# Patient Record
Sex: MALE | Race: WHITE | Employment: OTHER | URBAN - METROPOLITAN AREA
[De-identification: names, ages, dates, MRNs, and addresses within clinical notes are randomized per-mention and may not be internally consistent; named-entity substitution may affect disease eponyms.]

---

## 2017-05-18 ENCOUNTER — HOSPITAL ENCOUNTER (OUTPATIENT)
Dept: NON INVASIVE DIAGNOSTICS | Age: 77
Discharge: HOME | End: 2017-05-18
Attending: INTERNAL MEDICINE | Admitting: INTERNAL MEDICINE
Payer: MEDICARE

## 2017-05-18 ENCOUNTER — APPOINTMENT (OUTPATIENT)
Dept: GENERAL RADIOLOGY | Age: 77
End: 2017-05-18
Attending: INTERNAL MEDICINE
Payer: MEDICARE

## 2017-05-18 VITALS
WEIGHT: 202 LBS | DIASTOLIC BLOOD PRESSURE: 58 MMHG | RESPIRATION RATE: 20 BRPM | TEMPERATURE: 98.2 F | SYSTOLIC BLOOD PRESSURE: 99 MMHG | OXYGEN SATURATION: 97 % | HEART RATE: 60 BPM | BODY MASS INDEX: 26.77 KG/M2 | HEIGHT: 73 IN

## 2017-05-18 LAB
ANION GAP BLD CALC-SCNC: 6 MMOL/L (ref 5–15)
BASOPHILS # BLD AUTO: 0 K/UL (ref 0–0.1)
BASOPHILS # BLD: 0 % (ref 0–1)
BUN SERPL-MCNC: 16 MG/DL (ref 6–20)
BUN/CREAT SERPL: 13 (ref 12–20)
CALCIUM SERPL-MCNC: 8.6 MG/DL (ref 8.5–10.1)
CHLORIDE SERPL-SCNC: 106 MMOL/L (ref 97–108)
CO2 SERPL-SCNC: 24 MMOL/L (ref 21–32)
CREAT SERPL-MCNC: 1.25 MG/DL (ref 0.7–1.3)
EOSINOPHIL # BLD: 0.2 K/UL (ref 0–0.4)
EOSINOPHIL NFR BLD: 3 % (ref 0–7)
ERYTHROCYTE [DISTWIDTH] IN BLOOD BY AUTOMATED COUNT: 12.3 % (ref 11.5–14.5)
GLUCOSE SERPL-MCNC: 100 MG/DL (ref 65–100)
HCT VFR BLD AUTO: 42.5 % (ref 36.6–50.3)
HGB BLD-MCNC: 14.4 G/DL (ref 12.1–17)
LYMPHOCYTES # BLD AUTO: 22 % (ref 12–49)
LYMPHOCYTES # BLD: 1.5 K/UL (ref 0.8–3.5)
MCH RBC QN AUTO: 31.7 PG (ref 26–34)
MCHC RBC AUTO-ENTMCNC: 33.9 G/DL (ref 30–36.5)
MCV RBC AUTO: 93.6 FL (ref 80–99)
MONOCYTES # BLD: 0.8 K/UL (ref 0–1)
MONOCYTES NFR BLD AUTO: 12 % (ref 5–13)
NEUTS SEG # BLD: 4.3 K/UL (ref 1.8–8)
NEUTS SEG NFR BLD AUTO: 63 % (ref 32–75)
PLATELET # BLD AUTO: 161 K/UL (ref 150–400)
POTASSIUM SERPL-SCNC: 5.4 MMOL/L (ref 3.5–5.1)
RBC # BLD AUTO: 4.54 M/UL (ref 4.1–5.7)
SODIUM SERPL-SCNC: 136 MMOL/L (ref 136–145)
WBC # BLD AUTO: 6.9 K/UL (ref 4.1–11.1)

## 2017-05-18 PROCEDURE — C1785 PMKR, DUAL, RATE-RESP: HCPCS

## 2017-05-18 PROCEDURE — 74011000250 HC RX REV CODE- 250: Performed by: INTERNAL MEDICINE

## 2017-05-18 PROCEDURE — 77030031139 HC SUT VCRL2 J&J -A

## 2017-05-18 PROCEDURE — 80048 BASIC METABOLIC PNL TOTAL CA: CPT | Performed by: INTERNAL MEDICINE

## 2017-05-18 PROCEDURE — 77030018729 HC ELECTRD DEFIB PAD CARD -B

## 2017-05-18 PROCEDURE — 71010 XR CHEST PORT: CPT

## 2017-05-18 PROCEDURE — C1898 LEAD, PMKR, OTHER THAN TRANS: HCPCS

## 2017-05-18 PROCEDURE — 77030002996 HC SUT SLK J&J -A

## 2017-05-18 PROCEDURE — 74011000272 HC RX REV CODE- 272: Performed by: INTERNAL MEDICINE

## 2017-05-18 PROCEDURE — 85025 COMPLETE CBC W/AUTO DIFF WBC: CPT | Performed by: INTERNAL MEDICINE

## 2017-05-18 PROCEDURE — 36415 COLL VENOUS BLD VENIPUNCTURE: CPT | Performed by: INTERNAL MEDICINE

## 2017-05-18 PROCEDURE — C1769 GUIDE WIRE: HCPCS

## 2017-05-18 PROCEDURE — C1892 INTRO/SHEATH,FIXED,PEEL-AWAY: HCPCS

## 2017-05-18 PROCEDURE — A4565 SLINGS: HCPCS

## 2017-05-18 PROCEDURE — 77030012935 HC DRSG AQUACEL BMS -B

## 2017-05-18 PROCEDURE — 74011636320 HC RX REV CODE- 636/320: Performed by: INTERNAL MEDICINE

## 2017-05-18 PROCEDURE — 33216 INSERT 1 ELECTRODE PM-DEFIB: CPT

## 2017-05-18 PROCEDURE — 74011250636 HC RX REV CODE- 250/636: Performed by: INTERNAL MEDICINE

## 2017-05-18 PROCEDURE — C1894 INTRO/SHEATH, NON-LASER: HCPCS

## 2017-05-18 RX ORDER — SODIUM CHLORIDE 9 MG/ML
500 INJECTION, SOLUTION INTRAVENOUS ONCE
Status: COMPLETED | OUTPATIENT
Start: 2017-05-18 | End: 2017-05-18

## 2017-05-18 RX ORDER — SODIUM CHLORIDE 0.9 % (FLUSH) 0.9 %
5-10 SYRINGE (ML) INJECTION EVERY 8 HOURS
Status: DISCONTINUED | OUTPATIENT
Start: 2017-05-18 | End: 2017-05-18 | Stop reason: HOSPADM

## 2017-05-18 RX ORDER — GLUCOSAMINE HCL 500 MG
1000 TABLET ORAL DAILY
COMMUNITY
End: 2019-02-12

## 2017-05-18 RX ORDER — ACETAMINOPHEN 500 MG
500 TABLET ORAL AS NEEDED
COMMUNITY

## 2017-05-18 RX ORDER — LIDOCAINE HYDROCHLORIDE AND EPINEPHRINE 10; 10 MG/ML; UG/ML
1.5 INJECTION, SOLUTION INFILTRATION; PERINEURAL
Status: DISCONTINUED | OUTPATIENT
Start: 2017-05-18 | End: 2017-05-18

## 2017-05-18 RX ORDER — SODIUM CHLORIDE 9 MG/ML
25 INJECTION, SOLUTION INTRAVENOUS CONTINUOUS
Status: DISCONTINUED | OUTPATIENT
Start: 2017-05-18 | End: 2017-05-18 | Stop reason: HOSPADM

## 2017-05-18 RX ORDER — LANOLIN ALCOHOL/MO/W.PET/CERES
1000 CREAM (GRAM) TOPICAL DAILY
COMMUNITY
End: 2019-08-19 | Stop reason: SDUPTHER

## 2017-05-18 RX ORDER — MIDAZOLAM HYDROCHLORIDE 1 MG/ML
.5-1 INJECTION, SOLUTION INTRAMUSCULAR; INTRAVENOUS
Status: DISCONTINUED | OUTPATIENT
Start: 2017-05-18 | End: 2017-05-18

## 2017-05-18 RX ORDER — CEFAZOLIN SODIUM IN 0.9 % NACL 2 G/50 ML
2 INTRAVENOUS SOLUTION, PIGGYBACK (ML) INTRAVENOUS
Status: DISCONTINUED | OUTPATIENT
Start: 2017-05-18 | End: 2017-05-18

## 2017-05-18 RX ORDER — SODIUM CHLORIDE 0.9 % (FLUSH) 0.9 %
5-10 SYRINGE (ML) INJECTION AS NEEDED
Status: DISCONTINUED | OUTPATIENT
Start: 2017-05-18 | End: 2017-05-18 | Stop reason: HOSPADM

## 2017-05-18 RX ORDER — PYRIDOXINE HCL (VITAMIN B6) 100 MG
100 TABLET ORAL DAILY
COMMUNITY
End: 2019-02-12

## 2017-05-18 RX ORDER — ATROPINE SULFATE 0.1 MG/ML
0.5 INJECTION INTRAVENOUS AS NEEDED
Status: DISCONTINUED | OUTPATIENT
Start: 2017-05-18 | End: 2017-05-18

## 2017-05-18 RX ORDER — CEPHALEXIN 500 MG/1
500 CAPSULE ORAL 2 TIMES DAILY
Qty: 14 CAP | Refills: 0 | Status: SHIPPED | OUTPATIENT
Start: 2017-05-18 | End: 2017-05-25

## 2017-05-18 RX ORDER — SODIUM CHLORIDE 0.9 % (FLUSH) 0.9 %
5 SYRINGE (ML) INJECTION AS NEEDED
Status: DISCONTINUED | OUTPATIENT
Start: 2017-05-18 | End: 2017-05-18

## 2017-05-18 RX ORDER — FENTANYL CITRATE 50 UG/ML
25-200 INJECTION, SOLUTION INTRAMUSCULAR; INTRAVENOUS
Status: DISCONTINUED | OUTPATIENT
Start: 2017-05-18 | End: 2017-05-18

## 2017-05-18 RX ADMIN — FENTANYL CITRATE 50 MCG: 50 INJECTION, SOLUTION INTRAMUSCULAR; INTRAVENOUS at 12:18

## 2017-05-18 RX ADMIN — SODIUM CHLORIDE: 900 IRRIGANT IRRIGATION at 13:49

## 2017-05-18 RX ADMIN — LIDOCAINE HYDROCHLORIDE,EPINEPHRINE BITARTRATE 30 MG: 10; .01 INJECTION, SOLUTION INFILTRATION; PERINEURAL at 12:25

## 2017-05-18 RX ADMIN — MIDAZOLAM HYDROCHLORIDE 1 MG: 1 INJECTION, SOLUTION INTRAMUSCULAR; INTRAVENOUS at 12:51

## 2017-05-18 RX ADMIN — MIDAZOLAM HYDROCHLORIDE 2 MG: 1 INJECTION, SOLUTION INTRAMUSCULAR; INTRAVENOUS at 12:26

## 2017-05-18 RX ADMIN — MIDAZOLAM HYDROCHLORIDE 2 MG: 1 INJECTION, SOLUTION INTRAMUSCULAR; INTRAVENOUS at 13:45

## 2017-05-18 RX ADMIN — SODIUM CHLORIDE 500 ML: 900 INJECTION, SOLUTION INTRAVENOUS at 12:18

## 2017-05-18 RX ADMIN — FENTANYL CITRATE 50 MCG: 50 INJECTION, SOLUTION INTRAMUSCULAR; INTRAVENOUS at 13:46

## 2017-05-18 RX ADMIN — MIDAZOLAM HYDROCHLORIDE 1 MG: 1 INJECTION, SOLUTION INTRAMUSCULAR; INTRAVENOUS at 13:49

## 2017-05-18 RX ADMIN — MIDAZOLAM HYDROCHLORIDE 2 MG: 1 INJECTION, SOLUTION INTRAMUSCULAR; INTRAVENOUS at 12:17

## 2017-05-18 RX ADMIN — CEFAZOLIN 2 G: 1 INJECTION, POWDER, FOR SOLUTION INTRAMUSCULAR; INTRAVENOUS; PARENTERAL at 12:09

## 2017-05-18 RX ADMIN — SODIUM CHLORIDE 25 ML/HR: 900 INJECTION, SOLUTION INTRAVENOUS at 11:15

## 2017-05-18 RX ADMIN — IOPAMIDOL 50 ML: 755 INJECTION, SOLUTION INTRAVENOUS at 12:30

## 2017-05-18 RX ADMIN — FENTANYL CITRATE 50 MCG: 50 INJECTION, SOLUTION INTRAMUSCULAR; INTRAVENOUS at 12:25

## 2017-05-18 RX ADMIN — FENTANYL CITRATE 50 MCG: 50 INJECTION, SOLUTION INTRAMUSCULAR; INTRAVENOUS at 13:28

## 2017-05-18 RX ADMIN — MIDAZOLAM HYDROCHLORIDE 2 MG: 1 INJECTION, SOLUTION INTRAMUSCULAR; INTRAVENOUS at 13:19

## 2017-05-18 NOTE — PROGRESS NOTES
TRANSFER - OUT REPORT:    Verbal report given to Joel Thurman on Selene Never. being transferred to  for routine progression of care       Report consisted of patients Situation, Background, Assessment and   Recommendations(SBAR). Information from the following report(s) SBAR, Procedure Summary and MAR was reviewed with the receiving nurse. Opportunity for questions and clarification was provided.

## 2017-05-18 NOTE — PROGRESS NOTES
TRANSFER - IN REPORT:    Verbal report received from [employer] on Job Bejarano.  being received from procedure for routine progression of care. Report consisted of patients Situation, Background, Assessment and Recommendations(SBAR). Information from the following report(s) Procedure Summary, MAR, Recent Results and Med Rec Status was reviewed with the receiving clinician. Opportunity for questions and clarification was provided. Assessment completed upon patients arrival to [address] [place] [address] and care assumed. Cardiac Cath Lab Recovery Arrival Note:    Job Bejarano. arrived to [facility]. Patient procedure= generator changed, new RV lead. Patient on cardiac monitor, non-invasive blood pressure, SPO2 monitor. On  O2 @ 2 lpm via n/c. IV  of nacl on pump at 100 ml/hr. Patient status doing well without problems. Patient is A&Ox 4, very sleepy. Patient reports no complaints. PROCEDURE SITE CHECK:    Procedure site:without any bleeding and or hematoma, no pain/discomfort reported at procedure site. No change in patient status. Continue to monitor patient and status. 26  Dr Ryan Cassidy talked with pt's wife via the phone. [address] completed,ice bag applied to left chest wall. Wife took Rx for Keflex to out pt pharmacy fo fill.

## 2017-05-18 NOTE — DISCHARGE INSTRUCTIONS
PATIENT INSTRUCTIONS POST-PACEMAKER IMPLANT    1. No heavy lifting or exercises with the left arm for 2 weeks. This includes the following:  Do not raise arm above the shoulder level to comb hair, pull on clothes, etc... Do not use the affected arm to pull up or push up from a seated or laying  down position. Do not allow anyone else to pull on the affected arm. 2.  Do not shower for 7 days after discharge from the hospital.  Sponge baths are O.K., provided the pacemaker site is kept dry and the  affected arm is not raised and movement is limited. 3.  Please do not remove steri-strips. 4.  Do not drive for 1 week. 5.  Call Dr. En Zabala [phone] if you experience any of the following symptoms:  1. Redness at the pacemaker site  2. Swelling at or around the pacemaker or in the left arm  3. Pain around the pacemaker  4. Dizziness, lightheadedness, fainting spells  5. Lack of energy  6. Shortness of breath  7. Rapid heart rate  8. Chest or muscle twitches    6. Follow-up with Dr. En Zabala  in 7 days.         Medications to take at home: Keflex 500 mg tablet; take one tablet twice a day for one week    DATE: __________  Physician: ______________________________

## 2017-05-18 NOTE — PROCEDURES
Pacemaker Procedure Note  Kolby Meier  [id number]      Date of Procedure: 5/18/2017    Physician: Andrew Denis MD    Referring Physician: Dr. Carolina Person    Indications: This is a 67 yrs male who presents with IVON of chronic generator which was initially implanted for SSS and new insertion of V lead. Procedure: dual chamber pacemaker generator replacement and new V lead implantation and testing  The patient received prophylactic antibiotics in route to the laboratory. Once there, He was prepped and draped in the usual sterile fashion. The entry site was anesthetized with 1% lidocaine locally. A 4cm incision was then made parallel to the left clavicle. Utilizing blunt dissection, the chronic generator was explanted and chronic leads were interrogated. The V lead is no good but atrial lead was good and was connected to new generator. A new V lead was introduced as below. Using the modified Seldinger technique, A guidewire was placed in the left subclavian vein. This was exchanged for tear-away sheath. Through this, the ventricular lead was passed and the sheath torn away. This lead was actively fixed in RV apex. After documenting appropriate sensing and capture threshold, the lead was secured to the pectoralis floor with Nurolon suture around a suture sleeve. The gauzes were removed from the pocket. The pocket was irrigated with copious amounts of antibiotic solution. The leads were connected to the generator and the generator placed within the pocket. The pocket was closed with a running suture of 3.0 Vicryl. The skin was closed with 4.0 Vicryl. Antibiotic ointment and dressing were applied. The patient was returned to the cardiac care unit in stable condition. Complications: None    Implant Data: The patient received a [employer] generator, Model # PM [id number], serial number [id number]. The atrial lead was a chronic lead, serial number [id number]. P-waves were 1.1 millivolts.  The capture threshold was 1.25 volts at 0.4 milliseconds. The lead impedance was 440 ohms. The ventricular lead was a new [employer] lead, serial number [id number]. R-waves were 2.7 millivolts. The capture threshold was 1.4 volts at 0.4 milliseconds. The lead impedance was 680 ohms.        EBL; 20 cc  Specimen removed; none other than explanted chronic generator  Complication; none    Plan: usual post pacer care      Signed By: Kevyn Vanessa MD

## 2017-05-18 NOTE — PROGRESS NOTES
Cardiac Cath Lab Procedure Area Arrival Note:    Aguila Lovett. arrived to Cardiac Cath Lab, Procedure Area. Patient identifiers verified with NAME and DATE OF BIRTH. Procedure verified with patient. Consent forms verified. Allergies verified. Patient informed of procedure and plan of care. Questions answered with review. Patient voiced understanding of procedure and plan of care. Patient on cardiac monitor, non-invasive blood pressure, SPO2 monitor. On RA, placed on O2 @ 2 lpm via n/c. IV of ns on pump at 25 ml/hr. Patient status doing well without problems. Patient is A&Ox 3. Patient reports no pain. Patient medicated during procedure with orders obtained and verified by Dr. Glendia Mohs. Refer to patients Cardiac Cath Lab PROCEDURE REPORT for vital signs, assessment, status, and response during procedure, printed at end of case. Printed report on chart or scanned into chart.

## 2017-05-18 NOTE — IP AVS SNAPSHOT
[address] [place] [address] 
[phone] Patient: Talia Proctor MRN: [id number] ITE:0/7/8701 You are allergic to the following No active allergies Recent Documentation Height Weight BMI Smoking Status 1.854 m 91.6 kg 26.65 kg/m2 Former Smoker Emergency Contacts Name Discharge Info Relation Home Work Mobile [address] [facility] CAREGIVER [3] Spouse [3] [phone] About your hospitalization You were admitted on:  May 18, 2017 [facility] You were discharged on:  May 18, 2017 Unit phone number:  [phone] Why you were hospitalized Your primary diagnosis was:  Not on File Providers Seen During Your Hospitalizations Provider Role Specialty Primary office phone Jonh Martinez MD Attending Provider Cardiology [phone] Your Primary Care Physician (PCP) Primary Care Physician Office Phone Office Fax Darrick Pulido [phone] Follow-up Information Follow up With Details Comments Contact Info Harvey Parham MD   [address] [place] [address] [address] [place] [address] [phone] Jonh Martinez MD Schedule an appointment as soon as possible for a visit in 1 week  [address] [address] [place] [address] 
[phone] Current Discharge Medication List  
  
START taking these medications Dose & Instructions Dispensing Information Comments Morning Noon Evening Bedtime  
 cephALEXin 500 mg capsule Commonly known as:  Alexus Lai Your last dose was: Your next dose is:    
   
   
 Dose:  500 mg Take 1 Cap by mouth two (2) times a day for 7 days. Quantity:  14 Cap Refills:  0 CONTINUE these medications which have NOT CHANGED Dose & Instructions Dispensing Information Comments Morning Noon Evening Bedtime  
 aspirin 325 mg tablet Commonly known as:  ASPIRIN Your last dose was: Your next dose is:    
   
   
 Dose:  325 mg Take 325 mg by mouth daily. Refills:  0 Cholecalciferol (Vitamin D3) 3,000 unit Tab Your last dose was: Your next dose is:    
   
   
 Dose:  1000 Units Take 1,000 Units by mouth daily. Refills:  0  
     
   
   
   
  
 CRESTOR 10 mg tablet Generic drug:  rosuvastatin Your last dose was: Your next dose is: TAKE 1 TABLET BY MOUTH EVERY NIGHT AT BEDTIME Quantity:  90 Tab Refills:  3 FISH OIL 1,000 mg Cap Generic drug:  omega-3 fatty acids-vitamin e Your last dose was: Your next dose is:    
   
   
 Dose:  2 Cap Take 2 Caps by mouth daily. Refills:  0  
     
   
   
   
  
 hydroCHLOROthiazide 12.5 mg tablet Commonly known as:  HYDRODIURIL Your last dose was: Your next dose is:    
   
   
 Dose:  1 Tab Take 1 Tab by mouth daily. Refills:  0  
     
   
   
   
  
 NITROSTAT 0.4 mg SL tablet Generic drug:  nitroglycerin Your last dose was: Your next dose is:    
   
   
  Refills:  0  
     
   
   
   
  
 PREVNAR 13 (PF) 0.5 mL Syrg injection Generic drug:  pneumococcal 13 maribel conj dip Your last dose was: Your next dose is:    
   
   
  Refills:  0 Ramipril 10 mg Tab Your last dose was: Your next dose is:    
   
   
 Dose:  10 mg Take 10 mg by mouth daily. Indications: HYPERTENSION Quantity:  90 Tab Refills:  3  
     
   
   
   
  
 sildenafil citrate 100 mg tablet Commonly known as:  VIAGRA Your last dose was: Your next dose is:    
   
   
 Dose:  100 mg Take 1 Tab by mouth as needed. Indications: ERECTILE DYSFUNCTION Quantity:  20 Tab Refills:  5 TYLENOL EXTRA STRENGTH 500 mg tablet Generic drug:  acetaminophen Your last dose was: Your next dose is:    
   
   
 Dose:  500 mg Take 500 mg by mouth every six (6) hours as needed for Pain. Refills:  0  
     
   
   
   
  
 VITAMIN B-12 1,000 mcg tablet Generic drug:  cyanocobalamin Your last dose was: Your next dose is:    
   
   
 Dose:  1000 mcg Take 1,000 mcg by mouth daily. Refills:  0  
     
   
   
   
  
 VITAMIN B-6 100 mg tablet Generic drug:  pyridoxine (vitamin B6) Your last dose was: Your next dose is:    
   
   
 Dose:  100 mg Take 100 mg by mouth daily. Refills:  0 Where to Get Your Medications Information on where to get these meds will be given to you by the nurse or doctor. ! Ask your nurse or doctor about these medications  
  cephALEXin 500 mg capsule Discharge Instructions PATIENT INSTRUCTIONS POST-PACEMAKER IMPLANT 1. No heavy lifting or exercises with the left arm for 2 weeks. This includes the following: Do not raise arm above the shoulder level to comb hair, pull on clothes, etc... Do not use the affected arm to pull up or push up from a seated or laying 
down position. Do not allow anyone else to pull on the affected arm. 2.  Do not shower for 7 days after discharge from the hospital. 
Sponge baths are O.K., provided the pacemaker site is kept dry and the 
affected arm is not raised and movement is limited. 3.  Please do not remove steri-strips. 4.  Do not drive for 1 week. 5.  Call Dr. Kamla Berg [phone] if you experience any of the following symptoms: 1. Redness at the pacemaker site 2. Swelling at or around the pacemaker or in the left arm 3. Pain around the pacemaker 4. Dizziness, lightheadedness, fainting spells 5. Lack of energy 6. Shortness of breath 7. Rapid heart rate 8. Chest or muscle twitches 6.  Follow-up with Dr. Chente Topete  in 7 days. Medications to take at home: Keflex 500 mg tablet; take one tablet twice a day for one week DATE: __________  Physician: ______________________________ Discharge Orders None ACO Transitions of Care Introducing [facility] [address] Krista Johnson offers a voluntary care coordination program to provide high quality service and care to [facility] fee-for-service beneficiaries. Evonne Sullivan was designed to help you enhance your health and well-being through the following services: ? Transitions of Care  support for individuals who are transitioning from one care setting to another (example: Hospital to home). ? Chronic and Complex Care Coordination  support for individuals and caregivers of those with serious or chronic illnesses or with more than one chronic (ongoing) condition and those who take a number of different medications. If you meet specific medical criteria, a [address] [place] [address] Rd may call you directly to coordinate your care with your primary care physician and your other care providers. For questions about the [facility] programs, please, contact your physicians office. For general questions or additional information about Accountable Care Organizations: 
Please visit www.medicare.gov/acos. html or call 1-800-MEDICARE (1-338.719.2452) TTY users should call 1-666.601.2128. Introducing [facility]! [facility] introduces M[employer]rx patient portal. Now you can access parts of your medical record, email your doctor's office, and request medication refills online. 1. In your internet browser, go to [email or web address] 2. Click on the First Time User? Click Here link in the Sign In box. You will see the New Member Sign Up page. 3. Enter your M[employer]rx Access Code exactly as it appears below.  You will not need to use this code after youve completed the sign-up process. If you do not sign up before the expiration date, you must request a new code. · Medallia Access Code: [email or web address] Expires: 8/15/2017  2:19 PM 
 
4. Enter the last four digits of your Social Security Number (xxxx) and Date of Birth (mm/dd/yyyy) as indicated and click Submit. You will be taken to the next sign-up page. 5. Create a Medallia ID. This will be your Medallia login ID and cannot be changed, so think of one that is secure and easy to remember. 6. Create a Medallia password. You can change your password at any time. 7. Enter your Password Reset Question and Answer. This can be used at a later time if you forget your password. 8. Enter your e-mail address. You will receive e-mail notification when new information is available in [address] 9. Click Sign Up. You can now view and download portions of your medical record. 10. Click the Download Summary menu link to download a portable copy of your medical information. If you have questions, please visit the Frequently Asked Questions section of the Medallia website. Remember, Medallia is NOT to be used for urgent needs. For medical emergencies, dial 911. Now available from your iPhone and Android! General Information Please provide this summary of care documentation to your next provider. Patient Signature:  ____________________________________________________________ Date:  ____________________________________________________________  
  
Julisa Cook Provider Signature:  ____________________________________________________________ Date:  ____________________________________________________________

## 2017-05-18 NOTE — PROGRESS NOTES
Tolerated food and drink. Discharge instruction reviewed with pt and wife. Dr Naheed Enriquez notified of PCXR, no pneumo lungs clear.  K 5.4 in this Am labs. Told to inform pt to eat low Potassium foods, pt states he really only eats bananas, instructed pt and wife to restrict for a few days.

## 2017-05-18 NOTE — PROGRESS NOTES
Pt ambulated 75 ft gait steady, voided  Assisted with dressing. Ice bag to left chest wall sling to left arm.  1615 discharged via w/c with wife.

## 2017-10-11 ENCOUNTER — CLINICAL SUPPORT (OUTPATIENT)
Dept: INTERNAL MEDICINE CLINIC | Age: 77
End: 2017-10-11

## 2017-10-11 DIAGNOSIS — Z23 ENCOUNTER FOR IMMUNIZATION: Primary | ICD-10-CM

## 2017-10-27 DIAGNOSIS — M79.2 NEURALGIA: ICD-10-CM

## 2017-10-27 RX ORDER — GABAPENTIN 300 MG/1
300 CAPSULE ORAL
Qty: 30 CAP | Refills: 2 | Status: CANCELLED | OUTPATIENT
Start: 2017-10-27

## 2017-10-27 NOTE — TELEPHONE ENCOUNTER
Declined due to prior discontinuation of medication. Please encourage follow up for office visit appt.

## 2017-10-27 NOTE — TELEPHONE ENCOUNTER
Called pt, he acknowledge understanding but still wants something called in place because he is having headaches. Since pt requires thirty minutes for an appointment, there is nothing available. Please advise and call patient back.

## 2017-10-30 NOTE — TELEPHONE ENCOUNTER
Patient came into office requesting refill of medication. Informed patient that a f/u visit is needed. Spoke with  and placed patient in appt spot 11/3 at 9:45. Patient would still like to know what alternative he can take until Friday.  Patient can be reached at: [phone]

## 2017-10-31 RX ORDER — GABAPENTIN 300 MG/1
300 CAPSULE ORAL
Qty: 30 CAP | Refills: 2 | Status: SHIPPED | OUTPATIENT
Start: 2017-10-31 | End: 2019-02-12

## 2017-10-31 NOTE — TELEPHONE ENCOUNTER
If the neuralgia type headaches have recurred, then he may resume the gabapentin at the previous dose. appt 11/3/17 to review further.

## 2017-11-03 ENCOUNTER — OFFICE VISIT (OUTPATIENT)
Dept: INTERNAL MEDICINE CLINIC | Age: 77
End: 2017-11-03

## 2017-11-03 VITALS
HEART RATE: 60 BPM | DIASTOLIC BLOOD PRESSURE: 65 MMHG | RESPIRATION RATE: 20 BRPM | TEMPERATURE: 98.2 F | BODY MASS INDEX: 27.17 KG/M2 | SYSTOLIC BLOOD PRESSURE: 128 MMHG | HEIGHT: 73 IN | OXYGEN SATURATION: 97 % | WEIGHT: 205 LBS

## 2017-11-03 DIAGNOSIS — I10 ESSENTIAL HYPERTENSION: ICD-10-CM

## 2017-11-03 DIAGNOSIS — N40.0 BENIGN PROSTATIC HYPERPLASIA WITHOUT LOWER URINARY TRACT SYMPTOMS: ICD-10-CM

## 2017-11-03 DIAGNOSIS — E55.9 VITAMIN D DEFICIENCY: ICD-10-CM

## 2017-11-03 DIAGNOSIS — M54.81 OCCIPITAL NEURALGIA, UNSPECIFIED LATERALITY: ICD-10-CM

## 2017-11-03 DIAGNOSIS — Z00.00 INITIAL MEDICARE ANNUAL WELLNESS VISIT: Primary | ICD-10-CM

## 2017-11-03 DIAGNOSIS — E78.00 HYPERCHOLESTEROLEMIA: ICD-10-CM

## 2017-11-03 DIAGNOSIS — Z95.0 STATUS CARDIAC PACEMAKER: ICD-10-CM

## 2017-11-03 DIAGNOSIS — R73.9 HYPERGLYCEMIA: ICD-10-CM

## 2017-11-03 DIAGNOSIS — G31.84 MILD COGNITIVE IMPAIRMENT WITH MEMORY LOSS: ICD-10-CM

## 2017-11-03 DIAGNOSIS — E53.8 B12 DEFICIENCY: ICD-10-CM

## 2017-11-03 NOTE — PROGRESS NOTES
HISTORY OF PRESENT ILLNESS  Roberto Peña is a 65 y.o. male. HPI  Presents for f/u neuralgia pains, HTN, lipids, etc    Neuralgia pains recurred  Pt requested gabapentin  Used med for a few days with +benefit  Severe pain resolved, so pt dc'd med     Pt reports residual palpable mild discomfort  But, not significant per pt    Pt feels that memory is not perfect but not any worse now than a year ago    Pt denies significant BPH sx  Good stream and no significant nocturia    Past medical, Social, and Family history reviewed  Medications reviewed and updated. ROS  Complete ROS reviewed and negative or stable except as noted in HPI. Physical Exam   Constitutional: He is oriented to person, place, and time. He appears well-nourished. No distress. HENT:   Head: Normocephalic and atraumatic. Mouth/Throat: Oropharynx is clear and moist.   Eyes: EOM are normal. Pupils are equal, round, and reactive to light. No scleral icterus. Neck: Normal range of motion. Neck supple. No JVD present. Cardiovascular: Normal rate, regular rhythm and normal heart sounds. Exam reveals no gallop and no friction rub. No murmur heard. Pulmonary/Chest: Effort normal and breath sounds normal. No respiratory distress. He has no wheezes. He has no rales. Abdominal: Soft. He exhibits no distension. There is no tenderness. Musculoskeletal: Normal range of motion. He exhibits no edema. Lymphadenopathy:     He has no cervical adenopathy. Neurological: He is alert and oriented to person, place, and time. He exhibits normal muscle tone. Coordination normal.   Skin: Skin is warm. No rash noted. Psychiatric: He has a normal mood and affect. Nursing note and vitals reviewed. Prior labs reviewed. ASSESSMENT and PLAN  ? cervical OA and muscle tension exacerbating an occipital neuralgia    ICD-10-CM ICD-9-CM    1. Occipital neuralgia, unspecified laterality M54.81 723.8    2.  Mild cognitive impairment with memory loss G31.84 331.83      780.93    3. Status cardiac pacemaker Z95.0 V45.01    4. Essential hypertension I10 401.9 CBC WITH AUTOMATED DIFF   5. Hypercholesterolemia E78.00 272.0 CK      LIPID PANEL      METABOLIC PANEL, COMPREHENSIVE   6. Benign prostatic hyperplasia without lower urinary tract symptoms N40.0 600.00 PSA, DIAGNOSTIC (PROSTATE SPECIFIC AG)   7. B12 deficiency E53.8 266.2 VITAMIN B12   8. Vitamin D deficiency E55.9 268.9 VITAMIN D, 25 HYDROXY   9. Hyperglycemia R73.9 790.29 HEMOGLOBIN A1C WITH EAG   10. Initial Medicare annual wellness visit Z00.00 V70.0      Follow-up Disposition:  Return in about 6 months (around 5/3/2018), or if symptoms worsen or fail to improve, for blood pressure, cholesterol.   results and schedule of future studies reviewed with patient  reviewed diet, exercise and weight   cardiovascular risk and specific lipid/LDL goals reviewed  reviewed medications and side effects in detail   May use gabapentin intermittently  Consider C spine xrays  Consider PT - hold for now

## 2017-11-03 NOTE — MR AVS SNAPSHOT
Visit Information Date & Time Provider Department Dept. Phone Encounter #  
 11/3/2017  9:45 AM Maddy Javed, [address] [place] [address] and Internal Medicine [phone] [id number] Follow-up Instructions Return in about 6 months (around 5/3/2018), or if symptoms worsen or fail to improve, for blood pressure, cholesterol. Upcoming Health Maintenance Date Due DTaP/Tdap/Td series (1 - Tdap) 9/9/1960 MEDICARE YEARLY EXAM 6/23/2017 GLAUCOMA SCREENING Q2Y 5/10/2018 COLONOSCOPY 8/28/2018 Allergies as of 11/3/2017  Review Complete On: 11/3/2017 By: Maddy Javed MD  
 No Known Allergies Current Immunizations  Reviewed on 11/3/2017 Name Date Influenza High Dose Vaccine PF 10/11/2017, 9/20/2016 Influenza Vaccine 10/25/2013 Influenza Vaccine Split 11/27/2012  1:53 PM  
 Pneumococcal Conjugate (PCV-13) 6/22/2016 Pneumococcal Vaccine (Unspecified Type) 1/1/2012 Zoster Vaccine, Live 11/7/2013 Reviewed by Maddy Javed MD on 11/3/2017 at 11:02 AM  
 Reviewed by Maddy Javed MD on 11/3/2017 at 11:23 AM  
You Were Diagnosed With   
  
 Codes Comments Occipital neuralgia, unspecified laterality    -  Primary ICD-10-CM: M54.81 ICD-9-CM: 723.8 Mild cognitive impairment with memory loss     ICD-10-CM: G31.84 ICD-9-CM: 331.83, 780.93 Status cardiac pacemaker     ICD-10-CM: Z95.0 ICD-9-CM: V45.01 Essential hypertension     ICD-10-CM: I10 
ICD-9-CM: 401.9 Hypercholesterolemia     ICD-10-CM: E78.00 ICD-9-CM: 272.0 Benign prostatic hyperplasia without lower urinary tract symptoms     ICD-10-CM: N40.0 ICD-9-CM: 600.00   
 B12 deficiency     ICD-10-CM: E53.8 ICD-9-CM: 266.2 Vitamin D deficiency     ICD-10-CM: E55.9 ICD-9-CM: 268.9 Hyperglycemia     ICD-10-CM: R73.9 ICD-9-CM: 790.29 Initial Medicare annual wellness visit     ICD-10-CM: Z00.00 ICD-9-CM: V70.0 Vitals BP Pulse Temp Resp Height(growth percentile) Weight(growth percentile) 128/65 (BP 1 Location: Right arm, BP Patient Position: Sitting) 60 98.2 °F (36.8 °C) (Oral) 20 6' 1\" (1.854 m) 205 lb (93 kg) SpO2 BMI Smoking Status 97% 27.05 kg/m2 Former Smoker BMI and BSA Data Body Mass Index Body Surface Area  
 27.05 kg/m 2 2.19 m 2 Preferred Pharmacy Pharmacy Name Phone [facility] [address] [phone] Your Updated Medication List  
  
   
This list is accurate as of: 11/3/17 11:30 AM.  Always use your most recent med list.  
  
  
  
  
 aspirin 325 mg tablet Commonly known as:  ASPIRIN Take 325 mg by mouth daily. Cholecalciferol (Vitamin D3) 3,000 unit Tab Take 1,000 Units by mouth daily. CRESTOR 10 mg tablet Generic drug:  rosuvastatin TAKE 1 TABLET BY MOUTH EVERY NIGHT AT BEDTIME  
  
 FISH OIL 1,000 mg Cap Generic drug:  omega-3 fatty acids-vitamin e Take 2 Caps by mouth daily. gabapentin 300 mg capsule Commonly known as:  NEURONTIN Take 1 Cap by mouth nightly. hydroCHLOROthiazide 12.5 mg tablet Commonly known as:  HYDRODIURIL Take 1 Tab by mouth daily. NITROSTAT 0.4 mg SL tablet Generic drug:  nitroglycerin PREVNAR 13 (PF) 0.5 mL Syrg injection Generic drug:  pneumococcal 13 maribel conj dip Ramipril 10 mg Tab Take 10 mg by mouth daily. Indications: HYPERTENSION  
  
 * sildenafil citrate 100 mg tablet Commonly known as:  VIAGRA Take 1 Tab by mouth as needed. Indications: ERECTILE DYSFUNCTION  
  
 * sildenafil 20 mg tablet Commonly known as:  REVATIO  
TAKE 1 TABLET BY MOUTH EVERY DAY AS NEEDED  
  
 TYLENOL EXTRA STRENGTH 500 mg tablet Generic drug:  acetaminophen Take 500 mg by mouth every six (6) hours as needed for Pain. VITAMIN B-12 1,000 mcg tablet Generic drug:  cyanocobalamin Take 1,000 mcg by mouth daily. VITAMIN B-6 100 mg tablet Generic drug:  pyridoxine (vitamin B6) Take 100 mg by mouth daily. * Notice: This list has 2 medication(s) that are the same as other medications prescribed for you. Read the directions carefully, and ask your doctor or other care provider to review them with you. Follow-up Instructions Return in about 6 months (around 5/3/2018), or if symptoms worsen or fail to improve, for blood pressure, cholesterol. To-Do List   
 Around 11/07/2017 Lab:  CBC WITH AUTOMATED DIFF Around 11/07/2017 Lab:  CK Around 11/07/2017 Lab:  HEMOGLOBIN A1C WITH EAG Around 11/07/2017 Lab:  LIPID PANEL Around 11/07/2017 Lab:  METABOLIC PANEL, COMPREHENSIVE Around 11/07/2017 Lab:  PSA, DIAGNOSTIC (PROSTATE SPECIFIC AG) Around 11/07/2017 Lab:  VITAMIN B12 Around 11/07/2017 Lab:  VITAMIN D, 25 HYDROXY Patient Instructions Medicare Wellness Visit, Male The best way to live healthy is to have a healthy lifestyle by eating a well-balanced diet, exercising regularly, limiting alcohol and stopping smoking. Regular physical exams and screening tests are another way to keep healthy. Preventive exams provided by your health care provider can find health problems before they become diseases or illnesses. Preventive services including immunizations, screening tests, monitoring and exams can help you take care of your own health. All people over age 72 should have a pneumovax  and and a prevnar shot to prevent pneumonia. These are once in a lifetime unless you and your provider decide differently. All people over 65 should have a yearly flu shot and a tetanus vaccine every 10 years. Screening for diabetes mellitus with a blood sugar test should be done every year.  
 
Glaucoma is a disease of the eye due to increased ocular pressure that can lead to blindness and it should be done every year by an eye professional. 
 
Cardiovascular screening tests that check for elevated lipids (fatty part of blood) which can lead to heart disease and strokes should be done every 5 years. Colorectal screening that evaluates for blood or polyps in your colon should be done yearly as a stool test or every five years as a flexible sigmoidoscope or every 10 years as a colonoscopy up to age 76. Men up to age 76 may need a screening blood test for prostate cancer at certain intervals, depending on their personal and family history. This decision is between the patient and his provider. If you have been a smoker or had family history of abdominal aortic aneurysms, you and your provider may decide to schedule an ultrasound test of your aorta. Hepatitis C screening is also recommended for anyone born between [birth year] through [address]. A shingles vaccine is also recommended once in a lifetime after age 61. Your Medicare Wellness Exam is recommended annually. Here is a list of your current Health Maintenance items with a due date: 
Health Maintenance Due Topic Date Due  
 DTaP/Tdap/Td  (1 - Tdap) 09[birth year]09/1961 Tayla Ignacio Annual Well Visit  06/23/2017 Ask pharmacy about Tdap vaccine. Introducing [address]! Massimo Julio introduces [employer] patient portal. Now you can access parts of your medical record, email your doctor's office, and request medication refills online. 1. In your internet browser, go to [email or web address]. [email or web address] 2. Click on the First Time User? Click Here link in the Sign In box. You will see the New Member Sign Up page. 3. Enter your [employer] Access Code exactly as it appears below. You will not need to use this code after youve completed the sign-up process. If you do not sign up before the expiration date, you must request a new code. · [employer] Access Code: [email or web address] Expires: 1/16/2018  8:15 AM 
 
 4. Enter the last four digits of your Social Security Number (xxxx) and Date of Birth (mm/dd/yyyy) as indicated and click Submit. You will be taken to the next sign-up page. 5. Create a IndexTank ID. This will be your IndexTank login ID and cannot be changed, so think of one that is secure and easy to remember. 6. Create a [employer]Tank password. You can change your password at any time. 7. Enter your Password Reset Question and Answer. This can be used at a later time if you forget your password. 8. Enter your e-mail address. You will receive e-mail notification when new information is available in [address] 9. Click Sign Up. You can now view and download portions of your medical record. 10. Click the Download Summary menu link to download a portable copy of your medical information. If you have questions, please visit the Frequently Asked Questions section of the [employer] website. Remember, IndexTank is NOT to be used for urgent needs. For medical emergencies, dial 911. Now available from your iPhone and Android! Please provide this summary of care documentation to your next provider. Your primary care clinician is listed as [address] If you have any questions after today's visit, please call [phone].

## 2017-11-03 NOTE — PROGRESS NOTES
Rm#13  Chief Complaint   Patient presents with    Medication Evaluation     gabapentin med f/u states he no longer has head pains      1. Have you been to the ER, urgent care clinic since your last visit? Hospitalized since your last visit? No    2. Have you seen or consulted any other health care providers outside of the [address] [place] [address] since your last visit? Include any pap smears or colon screening.  No  Health Maintenance Due   Topic Date Due    DTaP/Tdap/Td series (1 - Tdap) 09/09/1961    MEDICARE YEARLY EXAM  06/23/2017     Due for medicare wellness-pt notifiedm    reviewed

## 2017-11-03 NOTE — PATIENT INSTRUCTIONS

## 2017-11-07 ENCOUNTER — HOSPITAL ENCOUNTER (OUTPATIENT)
Dept: LAB | Age: 77
Discharge: HOME | End: 2017-11-07
Payer: MEDICARE

## 2017-11-07 PROCEDURE — 82607 VITAMIN B-12: CPT

## 2017-11-07 PROCEDURE — 80053 COMPREHEN METABOLIC PANEL: CPT

## 2017-11-07 PROCEDURE — 84153 ASSAY OF PSA TOTAL: CPT

## 2017-11-07 PROCEDURE — 80061 LIPID PANEL: CPT

## 2017-11-07 PROCEDURE — 82550 ASSAY OF CK (CPK): CPT

## 2017-11-07 PROCEDURE — 83036 HEMOGLOBIN GLYCOSYLATED A1C: CPT

## 2017-11-07 PROCEDURE — 82306 VITAMIN D 25 HYDROXY: CPT

## 2017-11-07 PROCEDURE — 85025 COMPLETE CBC W/AUTO DIFF WBC: CPT

## 2017-11-16 DIAGNOSIS — R97.20 RISING PSA LEVEL: Primary | ICD-10-CM

## 2018-05-03 ENCOUNTER — HOSPITAL ENCOUNTER (OUTPATIENT)
Dept: LAB | Age: 78
Discharge: HOME | End: 2018-05-03
Payer: MEDICARE

## 2018-05-03 ENCOUNTER — OFFICE VISIT (OUTPATIENT)
Dept: INTERNAL MEDICINE CLINIC | Age: 78
End: 2018-05-03

## 2018-05-03 VITALS
HEART RATE: 60 BPM | BODY MASS INDEX: 26.48 KG/M2 | OXYGEN SATURATION: 99 % | SYSTOLIC BLOOD PRESSURE: 126 MMHG | DIASTOLIC BLOOD PRESSURE: 70 MMHG | WEIGHT: 199.8 LBS | TEMPERATURE: 97.5 F | RESPIRATION RATE: 14 BRPM | HEIGHT: 73 IN

## 2018-05-03 DIAGNOSIS — E78.00 HYPERCHOLESTEROLEMIA: ICD-10-CM

## 2018-05-03 DIAGNOSIS — L98.9 SKIN LESION OF FACE: Primary | ICD-10-CM

## 2018-05-03 DIAGNOSIS — N40.0 BENIGN PROSTATIC HYPERPLASIA WITHOUT LOWER URINARY TRACT SYMPTOMS: ICD-10-CM

## 2018-05-03 DIAGNOSIS — R73.9 HYPERGLYCEMIA: ICD-10-CM

## 2018-05-03 DIAGNOSIS — K63.5 POLYP OF COLON, UNSPECIFIED PART OF COLON, UNSPECIFIED TYPE: ICD-10-CM

## 2018-05-03 DIAGNOSIS — R97.20 RISING PSA LEVEL: ICD-10-CM

## 2018-05-03 DIAGNOSIS — I10 ESSENTIAL HYPERTENSION: ICD-10-CM

## 2018-05-03 DIAGNOSIS — M17.9 OSTEOARTHRITIS OF KNEE, UNSPECIFIED LATERALITY, UNSPECIFIED OSTEOARTHRITIS TYPE: ICD-10-CM

## 2018-05-03 DIAGNOSIS — G31.84 MILD COGNITIVE IMPAIRMENT WITH MEMORY LOSS: ICD-10-CM

## 2018-05-03 DIAGNOSIS — Z95.0 STATUS CARDIAC PACEMAKER: ICD-10-CM

## 2018-05-03 DIAGNOSIS — L98.9 SKIN LESION OF CHEST WALL: ICD-10-CM

## 2018-05-03 PROCEDURE — 80053 COMPREHEN METABOLIC PANEL: CPT

## 2018-05-03 PROCEDURE — 84153 ASSAY OF PSA TOTAL: CPT

## 2018-05-03 PROCEDURE — 83036 HEMOGLOBIN GLYCOSYLATED A1C: CPT

## 2018-05-03 PROCEDURE — 80061 LIPID PANEL: CPT

## 2018-05-03 PROCEDURE — 82550 ASSAY OF CK (CPK): CPT

## 2018-05-03 PROCEDURE — 85025 COMPLETE CBC W/AUTO DIFF WBC: CPT

## 2018-05-03 NOTE — PROGRESS NOTES
HPI:  Presents for f/u HTN, lipids, etc    C/o skin lesions on face and chest    +med compliance   +med tolerance    No other complaints. No CP, SOB, neuro sx      Past medical, Social, and Family history reviewed    Prior to Admission medications    Medication Sig Start Date End Date Taking? Authorizing Provider   gabapentin (NEURONTIN) 300 mg capsule Take 1 Cap by mouth nightly. 10/31/17  Yes Humera Raygoza MD   sildenafil (REVATIO) 20 mg tablet TAKE 1 TABLET BY MOUTH EVERY DAY AS NEEDED 10/12/17  Yes Humera Raygoza MD   cyanocobalamin (VITAMIN B-12) 1,000 mcg tablet Take 1,000 mcg by mouth daily. Yes Historical Provider   pyridoxine, vitamin B6, (VITAMIN B-6) 100 mg tablet Take 100 mg by mouth daily. Yes Historical Provider   Cholecalciferol, Vitamin D3, 3,000 unit tab Take 1,000 Units by mouth daily. Yes Historical Provider   acetaminophen (TYLENOL EXTRA STRENGTH) 500 mg tablet Take 500 mg by mouth every six (6) hours as needed for Pain. Yes Historical Provider   hydrochlorothiazide (HYDRODIURIL) 12.5 mg tablet Take 1 Tab by mouth daily. 8/30/16  Yes Historical Provider   sildenafil citrate (VIAGRA) 100 mg tablet Take 1 Tab by mouth as needed. Indications: ERECTILE DYSFUNCTION 8/5/16  Yes Humera Raygoza MD   NITROSTAT 0.4 mg SL tablet  4/13/16  Yes Historical Provider   CRESTOR 10 mg tablet TAKE 1 TABLET BY MOUTH EVERY NIGHT AT BEDTIME 7/5/15  Yes Humera Raygoza MD   aspirin (ASPIRIN) 325 mg tablet Take 325 mg by mouth daily. Yes Historical Provider   omega-3 fatty acids-vitamin e (FISH OIL) 1,000 mg Cap Take 2 Caps by mouth daily. Yes Historical Provider   Ramipril 10 mg Tab Take 10 mg by mouth daily. Indications: HYPERTENSION 9/28/12  Yes Humera Raygoza MD   PREVNAR 13, PF, 0.5 mL syrg injection  6/22/16   Historical Provider          ROS  Complete ROS reviewed and negative or stable except as noted in HPI.       Physical Exam   Constitutional: He is oriented to person, place, and time. He appears well-nourished. No distress. HENT:   Head: Normocephalic and atraumatic. Mouth/Throat: Oropharynx is clear and moist.   Eyes: EOM are normal. Pupils are equal, round, and reactive to light. No scleral icterus. Neck: Normal range of motion. Neck supple. No JVD present. Cardiovascular: Normal rate, regular rhythm and normal heart sounds. Exam reveals no gallop and no friction rub. No murmur heard. Pulmonary/Chest: Effort normal and breath sounds normal. No respiratory distress. He has no wheezes. He has no rales. Abdominal: Soft. He exhibits no distension. There is no tenderness. Musculoskeletal: Normal range of motion. He exhibits no edema. Lymphadenopathy:     He has no cervical adenopathy. Neurological: He is alert and oriented to person, place, and time. He exhibits normal muscle tone. Coordination normal.   Skin: Skin is warm. No rash noted. Psychiatric: He has a normal mood and affect. Nursing note and vitals reviewed. Prior labs reviewed. Assessment/Plan:  Favor seb K's for both lesions but r/o other    ICD-10-CM ICD-9-CM    1. Skin lesion of face L98.9 709.9 REFERRAL TO DERMATOLOGY   2. Skin lesion of chest wall L98.9 709.9 REFERRAL TO DERMATOLOGY   3. Essential hypertension I10 401.9 CBC WITH AUTOMATED DIFF   4. Hypercholesterolemia E78.00 272.0 CK      LIPID PANEL      METABOLIC PANEL, COMPREHENSIVE   5. Benign prostatic hyperplasia without lower urinary tract symptoms N40.0 600.00    6. Status cardiac pacemaker Z95.0 V45.01    7. Osteoarthritis of knee, unspecified laterality, unspecified osteoarthritis type M17.10 715.36    8. Mild cognitive impairment with memory loss G31.84 331.83      780.93    9. Rising PSA level R97.20 790.93 PSA, DIAGNOSTIC (PROSTATE SPECIFIC AG)   10. Hyperglycemia R73.9 790.29 HEMOGLOBIN A1C WITH EAG   11.  Polyp of colon, unspecified part of colon, unspecified type K63.5 211.3 REFERRAL TO GASTROENTEROLOGY     Follow-up Disposition:  Return in about 6 months (around 11/3/2018), or if symptoms worsen or fail to improve, for Medicare Wellness Visit, blood pressure, cholesterol.    results and schedule of future studies reviewed with patient  reviewed diet, exercise and weight   cardiovascular risk and specific lipid/LDL goals reviewed  reviewed medications and side effects in detail  Ref to derm surgeon  See eye for glaucoma screening  Continue current medications  Agree to PSA testing and colonoscopy screenings  Fasting labs

## 2018-05-03 NOTE — MR AVS SNAPSHOT
[address]  [address] [place] [address] 
[phone] Patient: Toni Inman. MRN: [id number] FOU:[birth year] Visit Information Date & Time Provider Department Dept. Phone Encounter #  
 5/3/2018  8:45 AM Debbie Doss, [address] [place] [address] and Internal Medicine [phone] [id number] Follow-up Instructions Return in about 6 months (around 11/3/2018), or if symptoms worsen or fail to improve, for Medicare Wellness Visit, blood pressure, cholesterol. Your Appointments 5/31/2018 11:45 AM  
ROUTINE CARE with Debbie Doss MD  
[facility] and Internal Medicine [address] [facility]) Appt Note: per pt/ scraw/$0CP/jnm [address] [facility] [address] [facility] [address]  
[place] [address] [facility] [address] Upcoming Health Maintenance Date Due DTaP/Tdap/Td series (1 - Tdap) 9/9/1960 GLAUCOMA SCREENING Q2Y 5/10/2018 COLONOSCOPY 8/28/2018 Influenza Age 5 to Adult 8/1/2018 MEDICARE YEARLY EXAM 11/4/2018 Allergies as of 5/3/2018  Review Complete On: 5/3/2018 By: Debbie Doss MD  
 No Known Allergies Current Immunizations  Reviewed on 5/3/2018 Name Date Influenza High Dose Vaccine PF 10/11/2017, 9/20/2016 Influenza Vaccine 10/25/2013 Influenza Vaccine Split 11/27/2012  1:53 PM  
 Pneumococcal Conjugate (PCV-13) 6/22/2016 Pneumococcal Vaccine (Unspecified Type) 1/1/2012 Zoster Vaccine, Live 11/7/2013 Reviewed by Debbie Doss MD on 5/3/2018 at  9:38 AM  
You Were Diagnosed With   
  
 Codes Comments Skin lesion of face    -  Primary ICD-10-CM: L98.9 ICD-9-CM: 709.9 Skin lesion of chest wall     ICD-10-CM: L98.9 ICD-9-CM: 709.9 Essential hypertension     ICD-10-CM: I10 
ICD-9-CM: 401.9 Hypercholesterolemia     ICD-10-CM: E78.00 ICD-9-CM: 272.0 Benign prostatic hyperplasia without lower urinary tract symptoms     ICD-10-CM: N40.0 ICD-9-CM: 600.00 Status cardiac pacemaker     ICD-10-CM: Z95.0 ICD-9-CM: V45.01 Osteoarthritis of knee, unspecified laterality, unspecified osteoarthritis type     ICD-10-CM: M17.10 ICD-9-CM: 715.36 Mild cognitive impairment with memory loss     ICD-10-CM: G31.84 ICD-9-CM: 331.83, 780.93 Rising PSA level     ICD-10-CM: R97.20 ICD-9-CM: 790.93 Hyperglycemia     ICD-10-CM: R73.9 ICD-9-CM: 790.29 Polyp of colon, unspecified part of colon, unspecified type     ICD-10-CM: K63.5 ICD-9-CM: 211.3 Vitals BP Pulse Temp Resp Height(growth percentile) Weight(growth percentile) 126/70 (BP 1 Location: Left arm, BP Patient Position: Sitting) 60 97.5 °F (36.4 °C) (Oral) 14 6' 1\" (1.854 m) 199 lb 12.8 oz (90.6 kg) SpO2 BMI Smoking Status 99% 26.36 kg/m2 Former Smoker Vitals History BMI and BSA Data Body Mass Index Body Surface Area  
 26.36 kg/m 2 2.16 m 2 Preferred Pharmacy Pharmacy Name Phone [facility] [address] [phone] Your Updated Medication List  
  
   
This list is accurate as of 5/3/18  9:44 AM.  Always use your most recent med list.  
  
  
  
  
 aspirin 325 mg tablet Commonly known as:  ASPIRIN Take 325 mg by mouth daily. Cholecalciferol (Vitamin D3) 3,000 unit Tab Take 1,000 Units by mouth daily. CRESTOR 10 mg tablet Generic drug:  rosuvastatin TAKE 1 TABLET BY MOUTH EVERY NIGHT AT BEDTIME  
  
 FISH OIL 1,000 mg Cap Generic drug:  omega-3 fatty acids-vitamin e Take 2 Caps by mouth daily. gabapentin 300 mg capsule Commonly known as:  NEURONTIN Take 1 Cap by mouth nightly. hydroCHLOROthiazide 12.5 mg tablet Commonly known as:  HYDRODIURIL Take 1 Tab by mouth daily. NITROSTAT 0.4 mg SL tablet Generic drug:  nitroglycerin PREVNAR 13 (PF) 0.5 mL Syrg injection Generic drug:  pneumococcal 13 maribel conj dip Ramipril 10 mg Tab Take 10 mg by mouth daily. Indications: HYPERTENSION  
  
 * sildenafil citrate 100 mg tablet Commonly known as:  VIAGRA Take 1 Tab by mouth as needed. Indications: ERECTILE DYSFUNCTION  
  
 * sildenafil (antihypertensive) 20 mg tablet Commonly known as:  REVATIO  
TAKE 1 TABLET BY MOUTH EVERY DAY AS NEEDED  
  
 TYLENOL EXTRA STRENGTH 500 mg tablet Generic drug:  acetaminophen Take 500 mg by mouth every six (6) hours as needed for Pain. VITAMIN B-12 1,000 mcg tablet Generic drug:  cyanocobalamin Take 1,000 mcg by mouth daily. VITAMIN B-6 100 mg tablet Generic drug:  pyridoxine (vitamin B6) Take 100 mg by mouth daily. * Notice: This list has 2 medication(s) that are the same as other medications prescribed for you. Read the directions carefully, and ask your doctor or other care provider to review them with you. We Performed the Following CBC WITH AUTOMATED DIFF [53916 CPT(R)] CK [id number] CPT(R)] HEMOGLOBIN A1C WITH EAG [89362 CPT(R)] LIPID PANEL [85485 CPT(R)] METABOLIC PANEL, COMPREHENSIVE [12729 CPT(R)] PSA, DIAGNOSTIC (PROSTATE SPECIFIC AG) [id number] CPT(R)] REFERRAL TO DERMATOLOGY [REF19 Custom] REFERRAL TO GASTROENTEROLOGY [TGM99 Custom] Follow-up Instructions Return in about 6 months (around 11/3/2018), or if symptoms worsen or fail to improve, for Medicare Wellness Visit, blood pressure, cholesterol. Referral Information Referral ID Referred By Referred To  
  
 [id number] Barry YUN MD   
   [address] Phone: [phone] Fax: [phone] Visits Status Start Date End Date 1 New Request 5/3/18 5/3/19  If your referral has a status of pending review or denied, additional information will be sent to support the outcome of this decision. Referral ID Referred By Referred To  
 [id number] JAMA, [address] [place] [address] [place], [address] Visits Status Start Date End Date 1 New Request 5/3/18 5/3/19 If your referral has a status of pending review or denied, additional information will be sent to support the outcome of this decision. Patient Instructions Ask pharmacy about the new shingles and Tdap vaccines Introducing [facility]! [employer] introduces [employer] patient portal. Now you can access parts of your medical record, email your doctor's office, and request medication refills online. 1. In your internet browser, go to [email or web address]. [employer] 2. Click on the First Time User? Click Here link in the Sign In box. You will see the New Member Sign Up page. 3. Enter your [employer] Access Code exactly as it appears below. You will not need to use this code after youve completed the sign-up process. If you do not sign up before the expiration date, you must request a new code. · [employer] Access Code: [email or web address] Expires: 8/1/2018  9:39 AM 
 
4. Enter the last four digits of your Social Security Number (xxxx) and Date of Birth (mm/dd/yyyy) as indicated and click Submit. You will be taken to the next sign-up page. 5. Create a [employer] ID. This will be your [employer] login ID and cannot be changed, so think of one that is secure and easy to remember. 6. Create a [employer] password. You can change your password at any time. 7. Enter your Password Reset Question and Answer. This can be used at a later time if you forget your password. 8. Enter your e-mail address. You will receive e-mail notification when new information is available in [address] 9. Click Sign Up. You can now view and download portions of your medical record. 10. Click the Download Summary menu link to download a portable copy of your medical information. If you have questions, please visit the Frequently Asked Questions section of the [employer] website. Remember, [employer] is NOT to be used for urgent needs. For medical emergencies, dial 911. Now available from your iPhone and Android! Please provide this summary of care documentation to your next provider. Your primary care clinician is listed as [address] If you have any questions after today's visit, please call [phone].

## 2018-05-03 NOTE — PROGRESS NOTES
1. Have you been to the ER, urgent care clinic since your last visit? Hospitalized since your last visit? No    2. Have you seen or consulted any other health care providers outside of the [facility] since your last visit? Include any pap smears or colon screening. No     Chief Complaint   Patient presents with    Hypertension     follow up    Cholesterol Problem     follow up   [facility]     patient noticed a mole on his upper chest, would like to discuss removing  it.       fasting    Room 14

## 2018-05-04 LAB
ALBUMIN SERPL-MCNC: 4.5 G/DL (ref 3.5–4.8)
ALBUMIN/GLOB SERPL: 2 {RATIO} (ref 1.2–2.2)
ALP SERPL-CCNC: 59 IU/L (ref 39–117)
ALT SERPL-CCNC: 20 IU/L (ref 0–44)
AST SERPL-CCNC: 22 IU/L (ref 0–40)
BASOPHILS # BLD AUTO: 0 X10E3/UL (ref 0–0.2)
BASOPHILS NFR BLD AUTO: 0 %
BILIRUB SERPL-MCNC: 0.5 MG/DL (ref 0–1.2)
BUN SERPL-MCNC: 16 MG/DL (ref 8–27)
BUN/CREAT SERPL: 14 (ref 10–24)
CALCIUM SERPL-MCNC: 9.3 MG/DL (ref 8.6–10.2)
CHLORIDE SERPL-SCNC: 102 MMOL/L (ref 96–106)
CHOLEST SERPL-MCNC: 126 MG/DL (ref 100–199)
CK SERPL-CCNC: 78 U/L (ref 24–204)
CO2 SERPL-SCNC: 27 MMOL/L (ref 18–29)
CREAT SERPL-MCNC: 1.12 MG/DL (ref 0.76–1.27)
EOSINOPHIL # BLD AUTO: 0.2 X10E3/UL (ref 0–0.4)
EOSINOPHIL NFR BLD AUTO: 3 %
ERYTHROCYTE [DISTWIDTH] IN BLOOD BY AUTOMATED COUNT: 13 % (ref 12.3–15.4)
EST. AVERAGE GLUCOSE BLD GHB EST-MCNC: 105 MG/DL
GLOBULIN SER CALC-MCNC: 2.2 G/DL (ref 1.5–4.5)
GLUCOSE SERPL-MCNC: 98 MG/DL (ref 65–99)
HBA1C MFR BLD: 5.3 % (ref 4.8–5.6)
HCT VFR BLD AUTO: 44.1 % (ref 37.5–51)
HDLC SERPL-MCNC: 36 MG/DL
HGB BLD-MCNC: 14.9 G/DL (ref 13–17.7)
IMM GRANULOCYTES # BLD: 0 X10E3/UL (ref 0–0.1)
IMM GRANULOCYTES NFR BLD: 1 %
LDLC SERPL CALC-MCNC: 65 MG/DL (ref 0–99)
LYMPHOCYTES # BLD AUTO: 1.6 X10E3/UL (ref 0.7–3.1)
LYMPHOCYTES NFR BLD AUTO: 24 %
MCH RBC QN AUTO: 31.5 PG (ref 26.6–33)
MCHC RBC AUTO-ENTMCNC: 33.8 G/DL (ref 31.5–35.7)
MCV RBC AUTO: 93 FL (ref 79–97)
MONOCYTES # BLD AUTO: 0.8 X10E3/UL (ref 0.1–0.9)
MONOCYTES NFR BLD AUTO: 12 %
NEUTROPHILS # BLD AUTO: 4 X10E3/UL (ref 1.4–7)
NEUTROPHILS NFR BLD AUTO: 60 %
PLATELET # BLD AUTO: 195 X10E3/UL (ref 150–379)
POTASSIUM SERPL-SCNC: 4.5 MMOL/L (ref 3.5–5.2)
PROT SERPL-MCNC: 6.7 G/DL (ref 6–8.5)
PSA SERPL-MCNC: 2.7 NG/ML (ref 0–4)
RBC # BLD AUTO: 4.73 X10E6/UL (ref 4.14–5.8)
SODIUM SERPL-SCNC: 141 MMOL/L (ref 134–144)
TRIGL SERPL-MCNC: 127 MG/DL (ref 0–149)
VLDLC SERPL CALC-MCNC: 25 MG/DL (ref 5–40)
WBC # BLD AUTO: 6.7 X10E3/UL (ref 3.4–10.8)

## 2018-05-31 ENCOUNTER — OFFICE VISIT (OUTPATIENT)
Dept: INTERNAL MEDICINE CLINIC | Age: 78
End: 2018-05-31

## 2018-05-31 NOTE — PROGRESS NOTES
Rm 14    No chief complaint on file. 1. Have you been to the ER, urgent care clinic since your last visit? Hospitalized since your last visit? 2. Have you seen or consulted any other health care providers outside of the [facility] since your last visit? Include any pap smears or colon screening. Health Maintenance Due   Topic Date Due    DTaP/Tdap/Td series (1 - Tdap) 09/09/1960    GLAUCOMA SCREENING Q2Y  05/10/2018    COLONOSCOPY  08/28/2018     PHQ over the last two weeks 11/3/2017   Little interest or pleasure in doing things Not at all   Feeling down, depressed or hopeless Not at all   Total Score PHQ 2 0     Fall Risk Assessment, last 12 mths 11/3/2017   Able to walk? Yes   Fall in past 12 months?  No         Learning Assessment 6/29/2015   PRIMARY LEARNER Patient   HIGHEST LEVEL OF EDUCATION - PRIMARY LEARNER  -   BARRIERS PRIMARY LEARNER NONE   PRIMARY LANGUAGE ENGLISH   LEARNER PREFERENCE PRIMARY DEMONSTRATION     -     -     -   ANSWERED BY Allen Montgomery SELF

## 2018-06-07 ENCOUNTER — HOSPITAL ENCOUNTER (OUTPATIENT)
Dept: LAB | Age: 78
Discharge: HOME | End: 2018-06-07

## 2018-06-07 ENCOUNTER — OFFICE VISIT (OUTPATIENT)
Dept: DERMATOLOGY | Facility: AMBULATORY SURGERY CENTER | Age: 78
End: 2018-06-07

## 2018-06-07 VITALS
HEIGHT: 73 IN | DIASTOLIC BLOOD PRESSURE: 62 MMHG | WEIGHT: 199 LBS | RESPIRATION RATE: 18 BRPM | OXYGEN SATURATION: 97 % | BODY MASS INDEX: 26.37 KG/M2 | SYSTOLIC BLOOD PRESSURE: 110 MMHG | TEMPERATURE: 98.5 F | HEART RATE: 61 BPM

## 2018-06-07 DIAGNOSIS — D48.5 NEOPLASM OF UNCERTAIN BEHAVIOR OF SKIN OF NECK: ICD-10-CM

## 2018-06-07 DIAGNOSIS — D22.9 MULTIPLE BENIGN NEVI: ICD-10-CM

## 2018-06-07 DIAGNOSIS — L57.0 ACTINIC KERATOSIS: Primary | ICD-10-CM

## 2018-06-07 DIAGNOSIS — D18.01 CHERRY ANGIOMA: ICD-10-CM

## 2018-06-07 DIAGNOSIS — L82.1 SEBORRHEIC KERATOSES: ICD-10-CM

## 2018-06-07 DIAGNOSIS — D48.5 NEOPLASM OF UNCERTAIN BEHAVIOR OF SKIN OF EYELID: ICD-10-CM

## 2018-06-07 RX ORDER — RAMIPRIL 10 MG/1
10 CAPSULE ORAL EVERY EVENING
COMMUNITY
Start: 2018-04-13 | End: 2019-05-06 | Stop reason: SDUPTHER

## 2018-06-07 NOTE — PROGRESS NOTES
Chief Complaint   Patient presents with    Skin Exam     1. Have you been to the ER, urgent care clinic since your last visit? Hospitalized since your last visit? No    2. Have you seen or consulted any other health care providers outside of the [address] [place] [address] since your last visit? Include any pap smears or colon screening.  No

## 2018-06-07 NOTE — PROGRESS NOTES
Name: Jannette Apley. Age: 65 y.o. Date: 6/7/2018    Chief Complaint:   Chief Complaint   Patient presents with    Skin Exam       Subjective:    HPI  Mr. Jannette Apley. is a 65 y.o. male who presents as a new patient to [facility] for a skin exam.  The patient was referred for this evaluation by Dr. Josi Matute. The patient has not had a skin exam in the past and the patient does have current complaints related to his skin. He reports a lesion that popped up abruptly on the left base of the neck. He reports more recently it went down. He reports a growth on the right lower eyelid that bothers him as well. The patient's pertinent skin history includes: no personal history or family history of skin cancer but reports sun burns    ROS: Constitutional: Negative. Dermatological : positive for - skin lesion changes      Social History     Social History    Marital status:      Spouse name: N/A    Number of children: N/A    Years of education: N/A     Occupational History    Not on file.      Social History Main Topics    Smoking status: Former Smoker     Types: Cigarettes     Quit date: 6/29/1964    Smokeless tobacco: Never Used    Alcohol use 4.2 oz/week     7 Cans of beer per week      Comment: 1 beer/day    Drug use: No    Sexual activity: Not on file     Other Topics Concern    Not on file     Social History Narrative       Family History   Problem Relation Age of Onset    Dementia Mother     Diabetes Mother     Hypertension Mother     Hypertension Brother        Past Medical History:   Diagnosis Date    Anxiety     hx zoloft use    BPH (benign prostatic hypertrophy)     Bradycardia     syncope - s/p pacemaker - Dr. Gorge Peck Chest pain     neg stress test - l2/2011 - EF 61%    Colon polyps     Diverticulitis of colon     DJD (degenerative joint disease) of knee     ED (erectile dysfunction)     GERD (gastroesophageal reflux disease)     EGD - 5/11 - gastritis    Hypercholesterolemia     Hypertension     Mild cognitive impairment with memory loss     Mild pulmonary hypertension (HCC)     OA (osteoarthritis) of knee     left - Dr. Rubia Drummond Pacemaker     PFO with atrial septal aneurysm     Renal cysts, acquired, bilateral     S/P knee replacement     right    Screening for AAA (abdominal aortic aneurysm)     NL 5/11    SSS (sick sinus syndrome) (HCC)     Status cardiac pacemaker     Sun-damaged skin     Sunburn, blistering     Wears glasses        Past Surgical History:   Procedure Laterality Date    HX ORTHOPAEDIC      R knee replacement - 2009ish    HX PACEMAKER      HX PACEMAKER PLACEMENT      HX TONSIL AND ADENOIDECTOMY         Current Outpatient Prescriptions   Medication Sig Dispense Refill    ramipril (ALTACE) 10 mg capsule       sildenafil (REVATIO) 20 mg tablet TAKE 1 TABLET BY MOUTH EVERY DAY AS NEEDED 20 Tab 2    cyanocobalamin (VITAMIN B-12) 1,000 mcg tablet Take 1,000 mcg by mouth daily.  pyridoxine, vitamin B6, (VITAMIN B-6) 100 mg tablet Take 100 mg by mouth daily.  Cholecalciferol, Vitamin D3, 3,000 unit tab Take 1,000 Units by mouth daily.  acetaminophen (TYLENOL EXTRA STRENGTH) 500 mg tablet Take 500 mg by mouth every six (6) hours as needed for Pain.  hydrochlorothiazide (HYDRODIURIL) 12.5 mg tablet Take 1 Tab by mouth daily.  NITROSTAT 0.4 mg SL tablet       CRESTOR 10 mg tablet TAKE 1 TABLET BY MOUTH EVERY NIGHT AT BEDTIME 90 Tab 3    aspirin (ASPIRIN) 325 mg tablet Take 325 mg by mouth daily.  omega-3 fatty acids-vitamin e (FISH OIL) 1,000 mg Cap Take 2 Caps by mouth daily.  gabapentin (NEURONTIN) 300 mg capsule Take 1 Cap by mouth nightly. 30 Cap 2    sildenafil citrate (VIAGRA) 100 mg tablet Take 1 Tab by mouth as needed.  Indications: ERECTILE DYSFUNCTION 20 Tab 5    PREVNAR 13, PF, 0.5 mL syrg injection   0    Ramipril 10 mg Tab Take 10 mg by mouth daily. Indications: HYPERTENSION 90 Tab 3       No Known Allergies      Objective:    Visit Vitals    /62 (BP 1 Location: Left arm, BP Patient Position: Sitting)    Pulse 61    Temp 98.5 °F (36.9 °C) (Oral)    Resp 18    Ht 6' 1\" (1.854 m)    Wt 90.3 kg (199 lb)    SpO2 97%    BMI 26.25 kg/m2       Car Pedroza is a 65 y.o. male who appears well and in no distress. He is awake, alert, and oriented. There is no preauricular, submandibular, or cervical lymphadenopathy. A skin examination was performed including his scalp, face (including eyelid), ears, neck, chest, back, abdomen, upper extremities (including digits/nails), breast.    There are scattered seborrheic keratoses - including many that are darkly pigmented. There is a 8 mm tan scaly papule on the medial right lower eyelid consistent with SK - his lesion of concern. There are scattered medium brown junctional and pink intradermal nevi without concerning features. There are scattered cherry angiomas. There is a pink and brown macule that is shiny on the left base of the neck, likely a residual inflamed SK but BCC vs atypical pigmented lesion can not be ruled out. There are two thin scaled Aks on the nose. Assessment/Plan:  1. Neoplasm of Uncertain Behavior, left base of neck. The differential diagnoses were discussed. A shave biopsy was advised to sample this lesion. The procedure was reviewed and verbal and written consent were obtained. The risks of pain, bleeding, infection, and scar were discussed. The patient is aware that this is a sample and is intended for diagnosis and not therapy of the skin lesion. I performed the procedure. The site was cleansed and anesthetized with 1% Lidocaine with Epinephrine 1:100,000. A shave biopsy was performed to sample the lesion. Drysol was used for hemostasis. The wound was bandaged and care reviewed. The specimen was sent to pathology.   I will contact the patient with the results and any further treatment that may be necessary. 2.Neoplasm of Uncertain Behavior, right lower eyelid. The differential diagnoses were discussed. A shave removal was advised to address this lesion. The procedure was reviewed and verbal and written consent were obtained. The risks of pain, bleeding, infection, recurrence and scar were discussed. I performed the procedure. The site was cleansed and anesthetized with 1% Lidocaine with Epinephrine 1:100,000. A shave removal was performed to remove the lesion in its clinical entirety. Drysol was used for hemostasis. The wound was bandaged and care reviewed. The specimen was sent to pathology. I will contact the patient with the results and any further treatment that may be necessary. 3.Actinic Keratoses. The diagnosis of this precancerous lesion related to sun exposure was reviewed. Verbal consent was obtained. I treated 2 lesions with cryotherapy and post-cryotherapy care was reviewed. 4.Seborrheic keratoses. The diagnosis was reviewed and the patient was reassured that no treatment is needed for these benign lesions. 5.Normal nevi. The diagnosis of normal nevi was reviewed. I discussed sun protection, sunscreen use, the warning signs of skin cancer, the need for self-skin examinations, and the need for regular practitioner exams every 6 months. The patient should follow up sooner as needed if new, changing, or symptomatic skin lesions arise. 6.Cherry angiomas. The diagnosis was reviewed and the patient was reassured that no treatment is needed for these benign lesions. [facility]   OFFICE PROCEDURE PROGRESS NOTE   Chart reviewed for the following:   ICheco, have reviewed the History, Physical and updated the Allergic reactions for Darrell Men. Cindy Kenney TIME OUT performed immediately prior to start of procedure:   IPatricia, have performed the following reviews on [facility].   prior to the start of the procedure:     * Patient was identified by name and date of birth   * Agreement on procedure being performed was verified   * Risks and Benefits explained to the patient   * Procedure site verified and marked as necessary   * Patient was positioned for comfort   * Consent was signed and verified     Time: 1000  Date of procedure: 6/7/2018  Procedure performed by: Gilberto Diallo DNP  Provider assisted by: Shira Godwin  Patient assisted by: self   How tolerated by patient: tolerated the procedure well with no complications   Comments: none

## 2018-06-07 NOTE — MR AVS SNAPSHOT
[address] [facility] [address] [place] [address] [address] 
[phone] Patient: Lisandra Augustine. MRN: [id number] Visit Information Date & Time Provider Department Dept. Phone Encounter #  
 2018  9:40 AM Tarik Forrest NP [place] [id number] Your Appointments 2018  8:00 AM  
Medicare Physical with Suri Calabrese MD  
[facility] and Internal Medicine [facility]) Appt Note: [address] [facility] [address] [facility] [place] [address]  
[place] [address] [facility] [address] Upcoming Health Maintenance Date Due DTaP/Tdap/Td series (1 - Tdap) 1961 GLAUCOMA SCREENING Q2Y 5/10/2018 COLONOSCOPY 2018 Influenza Age 5 to Adult 2018 MEDICARE YEARLY EXAM 2018 Allergies as of 2018  Review Complete On: 2018 By: Tarik Forrest NP No Known Allergies Current Immunizations  Reviewed on 5/3/2018 Name Date Influenza High Dose Vaccine PF 10/11/2017, 2016 Influenza Vaccine 10/25/2013 Influenza Vaccine Split 2012  1:53 PM  
 Pneumococcal Conjugate (PCV-13) 2016 Pneumococcal Vaccine (Unspecified Type) 2012 Zoster Vaccine, Live 2013 Not reviewed this visit Vitals BP Pulse Temp Resp Height(growth percentile) Weight(growth percentile) 110/62 (BP 1 Location: Left arm, BP Patient Position: Sitting) 61 98.5 °F (36.9 °C) (Oral) 18 6' 1\" (1.854 m) 199 lb (90.3 kg) SpO2 BMI Smoking Status 97% 26.25 kg/m2 Former Smoker Vitals History BMI and BSA Data Body Mass Index Body Surface Area  
 26.25 kg/m 2 2.16 m 2 Preferred Pharmacy Pharmacy Name Phone [facility], [address] [place] [address] [phone] Your Updated Medication List  
  
   
This list is accurate as of 6/7/18  9:45 AM.  Always use your most recent med list.  
  
  
  
  
 aspirin 325 mg tablet Commonly known as:  ASPIRIN Take 325 mg by mouth daily. Cholecalciferol (Vitamin D3) 3,000 unit Tab Take 1,000 Units by mouth daily. CRESTOR 10 mg tablet Generic drug:  rosuvastatin TAKE 1 TABLET BY MOUTH EVERY NIGHT AT BEDTIME  
  
 FISH OIL 1,000 mg Cap Generic drug:  omega-3 fatty acids-vitamin e Take 2 Caps by mouth daily. gabapentin 300 mg capsule Commonly known as:  NEURONTIN Take 1 Cap by mouth nightly. hydroCHLOROthiazide 12.5 mg tablet Commonly known as:  HYDRODIURIL Take 1 Tab by mouth daily. NITROSTAT 0.4 mg SL tablet Generic drug:  nitroglycerin PREVNAR 13 (PF) 0.5 mL Syrg injection Generic drug:  pneumococcal 13 maribel conj dip * Ramipril 10 mg Tab Take 10 mg by mouth daily. Indications: HYPERTENSION  
  
 * ramipril 10 mg capsule Commonly known as:  ALTACE  
  
 * sildenafil citrate 100 mg tablet Commonly known as:  VIAGRA Take 1 Tab by mouth as needed. Indications: ERECTILE DYSFUNCTION  
  
 * sildenafil (antihypertensive) 20 mg tablet Commonly known as:  REVATIO  
TAKE 1 TABLET BY MOUTH EVERY DAY AS NEEDED  
  
 TYLENOL EXTRA STRENGTH 500 mg tablet Generic drug:  acetaminophen Take 500 mg by mouth every six (6) hours as needed for Pain. VITAMIN B-12 1,000 mcg tablet Generic drug:  cyanocobalamin Take 1,000 mcg by mouth daily. VITAMIN B-6 100 mg tablet Generic drug:  pyridoxine (vitamin B6) Take 100 mg by mouth daily. * Notice: This list has 4 medication(s) that are the same as other medications prescribed for you. Read the directions carefully, and ask your doctor or other care provider to review them with you. Patient Instructions Self Skin Exam and Sunscreens Early detection and treatment is essential in the treatment of all forms of skin cancer. If caught early, all forms of skin cancer are curable. In addition to your regular visits, you should perform a monthly skin examination. Over time, you become familiar with what is normally found on your skin and can identify new or suspicious spots. One of the screening tools you can use to assess your skin is to follow the ABCDEs: 
 
A= Asymmetry (One half is unlike the other half) B= Border (An irregular, scalloped or poorly defined edge) C= Color (Is varied from one area to another, has shades of tan, brown/ black,       white, red or blue) D= Diameter (Spots larger than 6mm or a pencil eraser) E= Evolving (New spots or one that is changing in size, shape, or color) A follow- up interval will be customized based on your history of skin cancer or level of skin damage and risk factors. In any case, if you notice a suspicious or new spot, an appointment should be arranged between regular visits. Everyone should use sunscreen and sun-safe practices, which is especially important for those with a personal or family history of skin cancer. Suggestions for this include: 1. Use daily moisturizers containing SPF 30 or higher. 2. Wear long sleeve clothing with UPF ratings and a broad-brimmed hat. 3. Apply sunscreen with SPF 30 or higher to all sun exposed areas if you are going to be in the sun. A broad spectrum UVA/ UVB sunscreen is best.  Dont forget to REAPPLY every two hours or more often if swimming or sweating! 4. Avoid outside activities during peak sun hours, especially in the summer (10am- 2pm). 5. DO NOT use tanning beds. Using sunscreen and sun-safe practices can help reduce the likelihood of developing skin cancer or additional skin cancers in those previously diagnosed. Introducing [facility]!    
 Irma Kwan introduces [employer] patient portal. Now you can access parts of your medical record, email your doctor's office, and request medication refills online. 1. In your internet browser, go to [email or web address] [email or web address] 2. Click on the First Time User? Click Here link in the Sign In box. You will see the New Member Sign Up page. 3. Enter your [employer] Access Code exactly as it appears below. You will not need to use this code after youve completed the sign-up process. If you do not sign up before the expiration date, you must request a new code. · [employer] Access Code: [email or web address] Expires: 8/1/2018  9:39 AM 
 
4. Enter the last four digits of your Social Security Number (xxxx) and Date of Birth (mm/dd/yyyy) as indicated and click Submit. You will be taken to the next sign-up page. 5. Create a [employer] ID. This will be your [employer] login ID and cannot be changed, so think of one that is secure and easy to remember. 6. Create a [employer] password. You can change your password at any time. 7. Enter your Password Reset Question and Answer. This can be used at a later time if you forget your password. 8. Enter your e-mail address. You will receive e-mail notification when new information is available in [address] 9. Click Sign Up. You can now view and download portions of your medical record. 10. Click the Download Summary menu link to download a portable copy of your medical information. If you have questions, please visit the Frequently Asked Questions section of the [employer] website. Remember, [employer] is NOT to be used for urgent needs. For medical emergencies, dial 911. Now available from your iPhone and Android! Please provide this summary of care documentation to your next provider. Your primary care clinician is listed as [address] If you have any questions after today's visit, please call [phone].

## 2018-08-24 NOTE — PROGRESS NOTES
[address]  [facility] [address]  [phone]        August 27, 2018    Magnolia Leyva  [address]  [facility] [address]              Dear Magnolia Leyva      We have tried to reach you via telephone to notify you your prescription of Wellbutrin for a 90 day supply has been approved and sent to your pharmacy.  If you have any questions or concerns please call our clinic at [phone].          Sincerely,      [facility] Team       Cardiac Cath Lab Recovery Arrival Note:      John Rae. arrived to Cardiac Cath Lab, Recovery Area. Staff introduced to patient. Patient identifiers verified with NAME and DATE OF BIRTH. Procedure verified with patient. Consent forms reviewed and signed by patient or authorized representative and verified. Allergies verified. Patient and family oriented to department. Patient and family informed of procedure and plan of care. Questions answered with review. Patient prepped for procedure, per orders from physician, prior to arrival.    Patient on cardiac monitor, non-invasive blood pressure, SPO2 monitor. On room air. Patient is A&Ox 4. Patient reports no complaints. Patient in stretcher, in low position, with side rails up, call bell within reach, patient instructed to call if assistance as needed. Patient prep in: [address].    Patient family has pager # [phone]  Family in: wife in hospital.   Prep by: Marcus Grullon RN    3464 pre pacemaker teaching completed

## 2018-11-05 ENCOUNTER — HOSPITAL ENCOUNTER (OUTPATIENT)
Dept: LAB | Age: 78
Discharge: HOME | End: 2018-11-05
Payer: MEDICARE

## 2018-11-05 ENCOUNTER — OFFICE VISIT (OUTPATIENT)
Dept: INTERNAL MEDICINE CLINIC | Age: 78
End: 2018-11-05

## 2018-11-05 VITALS
HEART RATE: 69 BPM | SYSTOLIC BLOOD PRESSURE: 128 MMHG | OXYGEN SATURATION: 97 % | DIASTOLIC BLOOD PRESSURE: 74 MMHG | RESPIRATION RATE: 18 BRPM | TEMPERATURE: 97.6 F | WEIGHT: 207.6 LBS | BODY MASS INDEX: 27.51 KG/M2 | HEIGHT: 73 IN

## 2018-11-05 DIAGNOSIS — Z23 ENCOUNTER FOR IMMUNIZATION: ICD-10-CM

## 2018-11-05 DIAGNOSIS — E78.00 HYPERCHOLESTEROLEMIA: ICD-10-CM

## 2018-11-05 DIAGNOSIS — Z13.5 GLAUCOMA SCREENING: ICD-10-CM

## 2018-11-05 DIAGNOSIS — Z00.00 MEDICARE ANNUAL WELLNESS VISIT, SUBSEQUENT: Primary | ICD-10-CM

## 2018-11-05 DIAGNOSIS — N40.0 BENIGN PROSTATIC HYPERPLASIA WITHOUT LOWER URINARY TRACT SYMPTOMS: ICD-10-CM

## 2018-11-05 DIAGNOSIS — G31.84 MILD COGNITIVE IMPAIRMENT WITH MEMORY LOSS: ICD-10-CM

## 2018-11-05 DIAGNOSIS — R73.9 HYPERGLYCEMIA: ICD-10-CM

## 2018-11-05 DIAGNOSIS — Z95.0 STATUS CARDIAC PACEMAKER: ICD-10-CM

## 2018-11-05 DIAGNOSIS — K63.5 POLYP OF COLON, UNSPECIFIED PART OF COLON, UNSPECIFIED TYPE: ICD-10-CM

## 2018-11-05 DIAGNOSIS — I10 ESSENTIAL HYPERTENSION: ICD-10-CM

## 2018-11-05 PROCEDURE — 85025 COMPLETE CBC W/AUTO DIFF WBC: CPT

## 2018-11-05 PROCEDURE — 83036 HEMOGLOBIN GLYCOSYLATED A1C: CPT

## 2018-11-05 PROCEDURE — 82550 ASSAY OF CK (CPK): CPT

## 2018-11-05 PROCEDURE — 80053 COMPREHEN METABOLIC PANEL: CPT

## 2018-11-05 PROCEDURE — 80061 LIPID PANEL: CPT

## 2018-11-05 NOTE — PROGRESS NOTES
This is the Subsequent Medicare Annual Wellness Exam, performed 12 months or more after the Initial AWV or the last Subsequent AWV I have reviewed the patient's medical history in detail and updated the computerized patient record. History Past Medical History:  
Diagnosis Date  Anxiety   
 hx zoloft use  BPH (benign prostatic hypertrophy)  Bradycardia   
 syncope - s/p pacemaker - Dr. Chay Linn  Chest pain   
 neg stress test - l2/2011 - EF 61%  Colon polyps  Diverticulitis of colon  DJD (degenerative joint disease) of knee  ED (erectile dysfunction)  GERD (gastroesophageal reflux disease) EGD - 5/11 - gastritis  Hypercholesterolemia  Hypertension  Mild cognitive impairment with memory loss  Mild pulmonary hypertension ([address]  OA (osteoarthritis) of knee   
 left - Dr. Mariana Stewart  Pacemaker  PFO with atrial septal aneurysm  Renal cysts, acquired, bilateral   
 S/P knee replacement   
 right  Screening for AAA (abdominal aortic aneurysm) NL 5/11  
 SSS (sick sinus syndrome) (HCC)  Status cardiac pacemaker  Sun-damaged skin  Sunburn, blistering  Wears glasses Past Surgical History:  
Procedure Laterality Date  HX ORTHOPAEDIC    
 R knee replacement - [id number]  HX PACEMAKER    
 HX PACEMAKER PLACEMENT    
 HX TONSIL AND ADENOIDECTOMY Current Outpatient Medications Medication Sig Dispense Refill  ramipril (ALTACE) 10 mg capsule  gabapentin (NEURONTIN) 300 mg capsule Take 1 Cap by mouth nightly. 30 Cap 2  
 sildenafil (REVATIO) 20 mg tablet TAKE 1 TABLET BY MOUTH EVERY DAY AS NEEDED 20 Tab 2  cyanocobalamin (VITAMIN B-12) 1,000 mcg tablet Take 1,000 mcg by mouth daily.  pyridoxine, vitamin B6, (VITAMIN B-6) 100 mg tablet Take 100 mg by mouth daily.  Cholecalciferol, Vitamin D3, 3,000 unit tab Take 1,000 Units by mouth daily.  acetaminophen (TYLENOL EXTRA STRENGTH) 500 mg tablet Take 500 mg by mouth every six (6) hours as needed for Pain.  hydrochlorothiazide (HYDRODIURIL) 12.5 mg tablet Take 1 Tab by mouth daily.  sildenafil citrate (VIAGRA) 100 mg tablet Take 1 Tab by mouth as needed. Indications: ERECTILE DYSFUNCTION 20 Tab 5  
 NITROSTAT 0.4 mg SL tablet  CRESTOR 10 mg tablet TAKE 1 TABLET BY MOUTH EVERY NIGHT AT BEDTIME 90 Tab 3  
 aspirin (ASPIRIN) 325 mg tablet Take 325 mg by mouth daily.  omega-3 fatty acids-vitamin e (FISH OIL) 1,000 mg Cap Take 2 Caps by mouth daily.  Ramipril 10 mg Tab Take 10 mg by mouth daily. Indications: HYPERTENSION 90 Tab 3  
 PREVNAR 13, PF, 0.5 mL syrg injection   0 No Known Allergies Family History Problem Relation Age of Onset  Dementia Mother  Diabetes Mother  Hypertension Mother  Hypertension Brother Social History Tobacco Use  Smoking status: Former Smoker Types: Cigarettes Last attempt to quit: 1964 Years since quittin.3  Smokeless tobacco: Never Used Substance Use Topics  Alcohol use: Yes Alcohol/week: 4.2 oz Types: 7 Cans of beer per week Comment: 1 beer/day Patient Active Problem List  
Diagnosis Code  Hypertension I10  
 Hypercholesterolemia E78.00  Colon polyps K63.5  Benign prostatic hyperplasia without lower urinary tract symptoms N40.0  Renal cysts, acquired, bilateral N28.1  Status cardiac pacemaker Z95.0  
 OA (osteoarthritis) of knee M17.10  Mild cognitive impairment with memory loss G31.84  
 Erectile dysfunction N52.9  Advanced directives, counseling/discussion Z71.89  
 Rising PSA level R97.20 Depression Risk Factor Screening: PHQ over the last two weeks 2018 Little interest or pleasure in doing things Not at all Feeling down, depressed, irritable, or hopeless Not at all Total Score PHQ 2 0 Alcohol Risk Factor Screening: You average more than 14 drinks a week. - no 
 
1 beer per day reported Functional Ability and Level of Safety:  
Hearing Loss Hearing is good. Activities of Daily Living The home contains: no safety equipment. Patient does total self care Fall Risk Fall Risk Assessment, last 12 mths 11/5/2018 Able to walk? Yes Fall in past 12 months? No  
 
 
Abuse Screen Patient is not abused Cognitive Screening Evaluation of Cognitive Function: 
Has your family/caregiver stated any concerns about your memory: yes 
 
neuropsych testing last performed 10/2016 Patient Care Team  
Patient Care Team: 
Oseas Merrill MD as PCP - General (Internal Medicine) Felicia Andrea MD (Cardiology) Mae Kehr, MD (Dermatology) Emeka Ruth MD (Orthopedic Surgery) Sarwat Coy RN as Nurse Navigator Assessment/Plan Education and counseling provided: 
Are appropriate based on today's review and evaluation ICD-10-CM ICD-9-CM 1. Medicare annual wellness visit, subsequent Z00.00 V70.0 2. Encounter for immunization Z23 V03.89 INFLUENZA VACCINE INACTIVATED (IIV), SUBUNIT, ADJUVANTED, IM 3. Status cardiac pacemaker Z95.0 V45.01   
4. Essential hypertension I10 401.9 CBC WITH AUTOMATED DIFF 5. Hypercholesterolemia E78.00 272.0 CK  
   LIPID PANEL  
   METABOLIC PANEL, COMPREHENSIVE 6. Mild cognitive impairment with memory loss G31.84 331.83 REFERRAL TO NEUROPSYCHOLOGY  
  780.93   
7. Benign prostatic hyperplasia without lower urinary tract symptoms N40.0 600.00   
8. Polyp of colon, unspecified part of colon, unspecified type K63.5 211.3 REFERRAL TO GASTROENTEROLOGY 9. Hyperglycemia R73.9 790.29 HEMOGLOBIN A1C WITH EAG 10. Glaucoma screening Z13.5 V80.1 REFERRAL TO OPHTHALMOLOGY Follow-up Disposition: 
Return in about 6 months (around 5/5/2019), or if symptoms worsen or fail to improve, for blood pressure, cholesterol. results and schedule of future studies reviewed with patient 
reviewed diet, exercise and weight  
cardiovascular risk and specific lipid/LDL goals reviewed 
reviewed medications and side effects in detail Ref for colonoscopy given hx polyps Flu shot Encouraged vaccines at pharmacy Ref for repeat neuropsych testing.  
Ref for eye exam

## 2018-11-05 NOTE — PROGRESS NOTES
Rm 13 Chief Complaint Patient presents with [facility] Annual Wellness Visit 1. Have you been to the ER, urgent care clinic since your last visit? Hospitalized since your last visit? No 
 
2. Have you seen or consulted any other health care providers outside of the [address] [place] [address] since your last visit? Include any pap smears or colon screening.  No

## 2018-11-05 NOTE — LETTER
Mr. Farmer Handler. [address] [id number] Dear Marleny Handler.: 
 
Please find your most recent results below. Resulted Orders CBC WITH AUTOMATED DIFF Result Value Ref Range WBC 6.9 3.4 - 10.8 x10E3/uL  
 RBC 4.80 4. 14 - 5.80 x10E6/uL HGB 15.3 13.0 - 17.7 g/dL HCT 44.2 37.5 - 51.0 % MCV 92 79 - 97 fL  
 MCH 31.9 26.6 - 33.0 pg  
 MCHC 34.6 31.5 - 35.7 g/dL  
 RDW 13.4 12.3 - 15.4 % PLATELET 662 793 - 875 x10E3/uL NEUTROPHILS 62 Not Estab. % Lymphocytes 23 Not Estab. % MONOCYTES 10 Not Estab. % EOSINOPHILS 5 Not Estab. % BASOPHILS 0 Not Estab. %  
 ABS. NEUTROPHILS 4.3 1.4 - 7.0 x10E3/uL Abs Lymphocytes 1.6 0.7 - 3.1 x10E3/uL  
 ABS. MONOCYTES 0.7 0.1 - 0.9 x10E3/uL  
 ABS. EOSINOPHILS 0.3 0.0 - 0.4 x10E3/uL  
 ABS. BASOPHILS 0.0 0.0 - 0.2 x10E3/uL IMMATURE GRANULOCYTES 0 Not Estab. %  
 ABS. IMM. GRANS. 0.0 0.0 - 0.1 x10E3/uL CK Result Value Ref Range Creatine Kinase,Total 63 24 - 204 U/L  
HEMOGLOBIN A1C WITH EAG Result Value Ref Range Hemoglobin A1c 5.4 4.8 - 5.6 % Estimated average glucose 108 mg/dL LIPID PANEL Result Value Ref Range Cholesterol, total 145 100 - 199 mg/dL Triglyceride 107 0 - 149 mg/dL HDL Cholesterol 38 (L) >39 mg/dL VLDL, calculated 21 5 - 40 mg/dL LDL, calculated 86 0 - 99 mg/dL METABOLIC PANEL, COMPREHENSIVE Result Value Ref Range Glucose 105 (H) 65 - 99 mg/dL BUN 15 8 - 27 mg/dL Creatinine 1.11 0.76 - 1.27 mg/dL BUN/Creatinine ratio 14 10 - 24 Sodium 142 134 - 144 mmol/L Potassium 4.6 3.5 - 5.2 mmol/L Chloride 102 96 - 106 mmol/L  
 CO2 26 20 - 29 mmol/L Calcium 9.2 8.6 - 10.2 mg/dL Protein, total 7.0 6.0 - 8.5 g/dL Albumin 4.6 3.5 - 4.8 g/dL GLOBULIN, TOTAL 2.4 1.5 - 4.5 g/dL A-G Ratio 1.9 1.2 - 2.2 Bilirubin, total 0.4 0.0 - 1.2 mg/dL Alk.  phosphatase 60 39 - 117 IU/L  
 AST (SGOT) 22 0 - 40 IU/L  
 ALT (SGPT) 22 0 - 44 IU/L Labs are normal and at goal. 
 
RECOMMENDATIONS: 
 
Keep up the good work! Continue with current  medications. Please call me if you have any questions: [phone] Sincerely, Rance Osler, MD

## 2018-11-05 NOTE — PROGRESS NOTES
HPI: 
Presents for f/u HTN, lipids, etc 
 
Doing well. Reports +med compliance Stays active playing [employer] ball. No CP, SOB, neuro sx Past medical, Social, and Family history reviewed Prior to Admission medications Medication Sig Start Date End Date Taking? Authorizing Provider  
ramipril (ALTACE) 10 mg capsule  4/13/18  Yes Provider, Historical  
gabapentin (NEURONTIN) 300 mg capsule Take 1 Cap by mouth nightly. 10/31/17  Yes Ilir Lee MD  
sildenafil (REVATIO) 20 mg tablet TAKE 1 TABLET BY MOUTH EVERY DAY AS NEEDED 10/12/17  Yes Ilir Lee MD  
cyanocobalamin (VITAMIN B-12) 1,000 mcg tablet Take 1,000 mcg by mouth daily. Yes Provider, Historical  
pyridoxine, vitamin B6, (VITAMIN B-6) 100 mg tablet Take 100 mg by mouth daily. Yes Provider, Historical  
Cholecalciferol, Vitamin D3, 3,000 unit tab Take 1,000 Units by mouth daily. Yes Provider, Historical  
acetaminophen (TYLENOL EXTRA STRENGTH) 500 mg tablet Take 500 mg by mouth every six (6) hours as needed for Pain. Yes Provider, Historical  
hydrochlorothiazide (HYDRODIURIL) 12.5 mg tablet Take 1 Tab by mouth daily. 8/30/16  Yes Provider, Historical  
sildenafil citrate (VIAGRA) 100 mg tablet Take 1 Tab by mouth as needed. Indications: ERECTILE DYSFUNCTION 8/5/16  Yes Ilir Lee MD  
NITROSTAT 0.4 mg SL tablet  4/13/16  Yes Provider, Historical  
CRESTOR 10 mg tablet TAKE 1 TABLET BY MOUTH EVERY NIGHT AT BEDTIME 7/5/15  Yes Ilir Lee MD  
aspirin (ASPIRIN) 325 mg tablet Take 325 mg by mouth daily. Yes Provider, Historical  
omega-3 fatty acids-vitamin e (FISH OIL) 1,000 mg Cap Take 2 Caps by mouth daily. Yes Provider, Historical  
Ramipril 10 mg Tab Take 10 mg by mouth daily. Indications: HYPERTENSION 9/28/12  Yes MD CORRINE Walton 13, PF, 0.5 mL syrg injection  6/22/16   Provider, Historical  
  
 
 
ROS Complete ROS reviewed and negative or stable except as noted in HPI. Physical Exam  
Constitutional: He is oriented to person, place, and time. He appears well-nourished. No distress. HENT:  
Head: Normocephalic and atraumatic. Mouth/Throat: Oropharynx is clear and moist.  
Eyes: EOM are normal. Pupils are equal, round, and reactive to light. No scleral icterus. Neck: Normal range of motion. Neck supple. No JVD present. Cardiovascular: Normal rate, regular rhythm and normal heart sounds. Exam reveals no gallop and no friction rub. No murmur heard. Pulmonary/Chest: Effort normal and breath sounds normal. No respiratory distress. He has no wheezes. He has no rales. Abdominal: Soft. He exhibits no distension. There is no tenderness. Musculoskeletal: Normal range of motion. He exhibits no edema. Lymphadenopathy:  
  He has no cervical adenopathy. Neurological: He is alert and oriented to person, place, and time. He exhibits normal muscle tone. Coordination normal.  
Skin: Skin is warm. No rash noted. Psychiatric: He has a normal mood and affect. Nursing note and vitals reviewed. Prior labs reviewed. Assessment/Plan: ICD-10-CM ICD-9-CM 1. Essential hypertension I10 401.9 CBC WITH AUTOMATED DIFF 2. Encounter for immunization Z23 V03.89 INFLUENZA VACCINE INACTIVATED (IIV), SUBUNIT, ADJUVANTED, IM 3. Status cardiac pacemaker Z95.0 V45.01   
4. Hypercholesterolemia E78.00 272.0 CK  
   LIPID PANEL  
   METABOLIC PANEL, COMPREHENSIVE 5. Mild cognitive impairment with memory loss G31.84 331.83 REFERRAL TO NEUROPSYCHOLOGY  
  780.93   
6. Benign prostatic hyperplasia without lower urinary tract symptoms N40.0 600.00 7. Polyp of colon, unspecified part of colon, unspecified type K63.5 211.3 REFERRAL TO GASTROENTEROLOGY 8. Hyperglycemia R73.9 790.29 HEMOGLOBIN A1C WITH EAG 9. Medicare annual wellness visit, subsequent Z00.00 V70.0 10. Glaucoma screening Z13.5 V80.1 REFERRAL TO OPHTHALMOLOGY Follow-up Disposition: Return in about 6 months (around 5/5/2019), or if symptoms worsen or fail to improve, for blood pressure, cholesterol. results and schedule of future studies reviewed with patient 
reviewed diet, exercise and weight 
cardiovascular risk and specific lipid/LDL goals reviewed 
reviewed medications and side effects in detail Continue current medications

## 2018-11-06 LAB
ALBUMIN SERPL-MCNC: 4.6 G/DL (ref 3.5–4.8)
ALBUMIN/GLOB SERPL: 1.9 {RATIO} (ref 1.2–2.2)
ALP SERPL-CCNC: 60 IU/L (ref 39–117)
ALT SERPL-CCNC: 22 IU/L (ref 0–44)
AST SERPL-CCNC: 22 IU/L (ref 0–40)
BASOPHILS # BLD AUTO: 0 X10E3/UL (ref 0–0.2)
BASOPHILS NFR BLD AUTO: 0 %
BILIRUB SERPL-MCNC: 0.4 MG/DL (ref 0–1.2)
BUN SERPL-MCNC: 15 MG/DL (ref 8–27)
BUN/CREAT SERPL: 14 (ref 10–24)
CALCIUM SERPL-MCNC: 9.2 MG/DL (ref 8.6–10.2)
CHLORIDE SERPL-SCNC: 102 MMOL/L (ref 96–106)
CHOLEST SERPL-MCNC: 145 MG/DL (ref 100–199)
CK SERPL-CCNC: 63 U/L (ref 24–204)
CO2 SERPL-SCNC: 26 MMOL/L (ref 20–29)
CREAT SERPL-MCNC: 1.11 MG/DL (ref 0.76–1.27)
EOSINOPHIL # BLD AUTO: 0.3 X10E3/UL (ref 0–0.4)
EOSINOPHIL NFR BLD AUTO: 5 %
ERYTHROCYTE [DISTWIDTH] IN BLOOD BY AUTOMATED COUNT: 13.4 % (ref 12.3–15.4)
EST. AVERAGE GLUCOSE BLD GHB EST-MCNC: 108 MG/DL
GLOBULIN SER CALC-MCNC: 2.4 G/DL (ref 1.5–4.5)
GLUCOSE SERPL-MCNC: 105 MG/DL (ref 65–99)
HBA1C MFR BLD: 5.4 % (ref 4.8–5.6)
HCT VFR BLD AUTO: 44.2 % (ref 37.5–51)
HDLC SERPL-MCNC: 38 MG/DL
HGB BLD-MCNC: 15.3 G/DL (ref 13–17.7)
IMM GRANULOCYTES # BLD: 0 X10E3/UL (ref 0–0.1)
IMM GRANULOCYTES NFR BLD: 0 %
LDLC SERPL CALC-MCNC: 86 MG/DL (ref 0–99)
LYMPHOCYTES # BLD AUTO: 1.6 X10E3/UL (ref 0.7–3.1)
LYMPHOCYTES NFR BLD AUTO: 23 %
MCH RBC QN AUTO: 31.9 PG (ref 26.6–33)
MCHC RBC AUTO-ENTMCNC: 34.6 G/DL (ref 31.5–35.7)
MCV RBC AUTO: 92 FL (ref 79–97)
MONOCYTES # BLD AUTO: 0.7 X10E3/UL (ref 0.1–0.9)
MONOCYTES NFR BLD AUTO: 10 %
NEUTROPHILS # BLD AUTO: 4.3 X10E3/UL (ref 1.4–7)
NEUTROPHILS NFR BLD AUTO: 62 %
PLATELET # BLD AUTO: 196 X10E3/UL (ref 150–379)
POTASSIUM SERPL-SCNC: 4.6 MMOL/L (ref 3.5–5.2)
PROT SERPL-MCNC: 7 G/DL (ref 6–8.5)
RBC # BLD AUTO: 4.8 X10E6/UL (ref 4.14–5.8)
SODIUM SERPL-SCNC: 142 MMOL/L (ref 134–144)
TRIGL SERPL-MCNC: 107 MG/DL (ref 0–149)
VLDLC SERPL CALC-MCNC: 21 MG/DL (ref 5–40)
WBC # BLD AUTO: 6.9 X10E3/UL (ref 3.4–10.8)

## 2019-02-12 RX ORDER — GLUCOSAMINE SULFATE 1500 MG
1000 POWDER IN PACKET (EA) ORAL DAILY
COMMUNITY

## 2019-02-13 ENCOUNTER — HOSPITAL ENCOUNTER (OUTPATIENT)
Age: 79
Setting detail: OUTPATIENT SURGERY
Discharge: HOME | End: 2019-02-13
Attending: INTERNAL MEDICINE | Admitting: INTERNAL MEDICINE
Payer: MEDICARE

## 2019-02-13 ENCOUNTER — ANESTHESIA (OUTPATIENT)
Dept: ENDOSCOPY | Age: 79
End: 2019-02-13
Payer: MEDICARE

## 2019-02-13 ENCOUNTER — ANESTHESIA EVENT (OUTPATIENT)
Dept: ENDOSCOPY | Age: 79
End: 2019-02-13
Payer: MEDICARE

## 2019-02-13 VITALS
DIASTOLIC BLOOD PRESSURE: 61 MMHG | OXYGEN SATURATION: 95 % | HEIGHT: 74 IN | TEMPERATURE: 97.8 F | WEIGHT: 210 LBS | HEART RATE: 60 BPM | SYSTOLIC BLOOD PRESSURE: 109 MMHG | BODY MASS INDEX: 26.95 KG/M2 | RESPIRATION RATE: 24 BRPM

## 2019-02-13 PROCEDURE — 74011250636 HC RX REV CODE- 250/636

## 2019-02-13 PROCEDURE — 77030027957 HC TBNG IRR ENDOGTR BUSS -B: Performed by: INTERNAL MEDICINE

## 2019-02-13 PROCEDURE — 76040000019: Performed by: INTERNAL MEDICINE

## 2019-02-13 PROCEDURE — 76060000031 HC ANESTHESIA FIRST 0.5 HR: Performed by: INTERNAL MEDICINE

## 2019-02-13 RX ORDER — SODIUM CHLORIDE 9 MG/ML
50 INJECTION, SOLUTION INTRAVENOUS CONTINUOUS
Status: DISCONTINUED | OUTPATIENT
Start: 2019-02-13 | End: 2019-02-13 | Stop reason: HOSPADM

## 2019-02-13 RX ORDER — ATROPINE SULFATE 0.1 MG/ML
0.5 INJECTION INTRAVENOUS
Status: DISCONTINUED | OUTPATIENT
Start: 2019-02-13 | End: 2019-02-13 | Stop reason: HOSPADM

## 2019-02-13 RX ORDER — SODIUM CHLORIDE 9 MG/ML
INJECTION, SOLUTION INTRAVENOUS
Status: DISCONTINUED | OUTPATIENT
Start: 2019-02-13 | End: 2019-02-13 | Stop reason: HOSPADM

## 2019-02-13 RX ORDER — SODIUM CHLORIDE 0.9 % (FLUSH) 0.9 %
5-40 SYRINGE (ML) INJECTION EVERY 8 HOURS
Status: DISCONTINUED | OUTPATIENT
Start: 2019-02-13 | End: 2019-02-13 | Stop reason: HOSPADM

## 2019-02-13 RX ORDER — FENTANYL CITRATE 50 UG/ML
100 INJECTION, SOLUTION INTRAMUSCULAR; INTRAVENOUS
Status: DISCONTINUED | OUTPATIENT
Start: 2019-02-13 | End: 2019-02-13 | Stop reason: HOSPADM

## 2019-02-13 RX ORDER — PROPOFOL 10 MG/ML
INJECTION, EMULSION INTRAVENOUS AS NEEDED
Status: DISCONTINUED | OUTPATIENT
Start: 2019-02-13 | End: 2019-02-13 | Stop reason: HOSPADM

## 2019-02-13 RX ORDER — MIDAZOLAM HYDROCHLORIDE 1 MG/ML
.25-5 INJECTION, SOLUTION INTRAMUSCULAR; INTRAVENOUS
Status: DISCONTINUED | OUTPATIENT
Start: 2019-02-13 | End: 2019-02-13 | Stop reason: HOSPADM

## 2019-02-13 RX ORDER — DEXTROMETHORPHAN/PSEUDOEPHED 2.5-7.5/.8
1.2 DROPS ORAL
Status: DISCONTINUED | OUTPATIENT
Start: 2019-02-13 | End: 2019-02-13 | Stop reason: HOSPADM

## 2019-02-13 RX ORDER — SODIUM CHLORIDE 0.9 % (FLUSH) 0.9 %
5-40 SYRINGE (ML) INJECTION AS NEEDED
Status: DISCONTINUED | OUTPATIENT
Start: 2019-02-13 | End: 2019-02-13 | Stop reason: HOSPADM

## 2019-02-13 RX ORDER — EPINEPHRINE 0.1 MG/ML
1 INJECTION INTRACARDIAC; INTRAVENOUS
Status: DISCONTINUED | OUTPATIENT
Start: 2019-02-13 | End: 2019-02-13 | Stop reason: HOSPADM

## 2019-02-13 RX ORDER — FLUMAZENIL 0.1 MG/ML
0.2 INJECTION INTRAVENOUS
Status: DISCONTINUED | OUTPATIENT
Start: 2019-02-13 | End: 2019-02-13 | Stop reason: HOSPADM

## 2019-02-13 RX ORDER — LIDOCAINE HYDROCHLORIDE 20 MG/ML
INJECTION, SOLUTION EPIDURAL; INFILTRATION; INTRACAUDAL; PERINEURAL AS NEEDED
Status: DISCONTINUED | OUTPATIENT
Start: 2019-02-13 | End: 2019-02-13 | Stop reason: HOSPADM

## 2019-02-13 RX ORDER — NALOXONE HYDROCHLORIDE 0.4 MG/ML
0.4 INJECTION, SOLUTION INTRAMUSCULAR; INTRAVENOUS; SUBCUTANEOUS
Status: DISCONTINUED | OUTPATIENT
Start: 2019-02-13 | End: 2019-02-13 | Stop reason: HOSPADM

## 2019-02-13 RX ADMIN — PROPOFOL 20 MG: 10 INJECTION, EMULSION INTRAVENOUS at 09:11

## 2019-02-13 RX ADMIN — PROPOFOL 20 MG: 10 INJECTION, EMULSION INTRAVENOUS at 09:13

## 2019-02-13 RX ADMIN — PROPOFOL 20 MG: 10 INJECTION, EMULSION INTRAVENOUS at 09:04

## 2019-02-13 RX ADMIN — PROPOFOL 20 MG: 10 INJECTION, EMULSION INTRAVENOUS at 09:07

## 2019-02-13 RX ADMIN — PROPOFOL 20 MG: 10 INJECTION, EMULSION INTRAVENOUS at 09:03

## 2019-02-13 RX ADMIN — SODIUM CHLORIDE: 9 INJECTION, SOLUTION INTRAVENOUS at 08:17

## 2019-02-13 RX ADMIN — PROPOFOL 20 MG: 10 INJECTION, EMULSION INTRAVENOUS at 09:05

## 2019-02-13 RX ADMIN — PROPOFOL 20 MG: 10 INJECTION, EMULSION INTRAVENOUS at 09:06

## 2019-02-13 RX ADMIN — PROPOFOL 20 MG: 10 INJECTION, EMULSION INTRAVENOUS at 09:08

## 2019-02-13 RX ADMIN — PROPOFOL 20 MG: 10 INJECTION, EMULSION INTRAVENOUS at 09:09

## 2019-02-13 RX ADMIN — LIDOCAINE HYDROCHLORIDE 50 MG: 20 INJECTION, SOLUTION EPIDURAL; INFILTRATION; INTRACAUDAL; PERINEURAL at 09:03

## 2019-02-13 RX ADMIN — PROPOFOL 20 MG: 10 INJECTION, EMULSION INTRAVENOUS at 09:10

## 2019-02-13 NOTE — DISCHARGE INSTRUCTIONS
[address]  Yessica Bone, [address] [facility]  [id number]  1940    COLON DISCHARGE INSTRUCTIONS    DISCOMFORT:  Redness at IV site- apply warm compress to area; if redness or soreness persist- contact your physician  There may be a slight amount of blood passed from the rectum  Gaseous discomfort- walking, belching will help relieve any discomfort  You may not operate a vehicle for 12 hours  You may not engage in an occupation involving machinery or appliances for rest of today  You may not drink alcoholic beverages for at least 12 hours  Avoid making any critical decisions for at least 24 hour  DIET:   Regular diet. - however -  remember your colon is empty and a heavy meal will produce gas. Avoid these foods:  vegetables, fried / greasy foods, carbonated drinks for today         ACTIVITY:  You may resume your normal daily activities it is recommended that you spend the remainder of the day resting -  avoid any strenuous activity. CALL M.D. ANY SIGN OF:   Increasing pain, nausea, vomiting  Abdominal distension (swelling)  New increased bleeding (oral or rectal)  Fever (chills)  Pain in chest area  Bloody discharge from nose or mouth  Shortness of breath     Follow-up Instructions:   Call Dr. Manuela Montalvo for any questions or problems.    Telephone # [phone]  No follow up colonosco[py necessary    Impression:  1.- Diverticulosis

## 2019-02-13 NOTE — ANESTHESIA POSTPROCEDURE EVALUATION
Procedure(s): 
COLONOSCOPY. Anesthesia Post Evaluation Comments: Post-Anesthesia Evaluation and Assessment I have evaluated the patient and they are ready for PACU discharge. Patient: Matilde De Santiago MRN: [id number]  SSN: [id number] YOB: 1940  Age: 77 y.o. Sex: male Cardiovascular Function/Vital Signs /61   Pulse 60   Temp 36.6 °C (97.8 °F)   Resp 24   Ht 6' 1.5\" (1.867 m)   Wt 95.3 kg (210 lb)   SpO2 95%   BMI 27.33 kg/m² Patient is status post MAC anesthesia for Procedure(s): 
COLONOSCOPY. Nausea/Vomiting: None Postoperative hydration reviewed and adequate. Pain: 
Pain Scale 1: Numeric (0 - 10) (02/13/19 0935) Pain Intensity 1: 0 (02/13/19 0935) Managed Neurological Status: At baseline Mental Status, Level of Consciousness: Alert and  oriented to person, place, and time Pulmonary Status:  
O2 Device: Room air (02/13/19 0935) Adequate oxygenation and airway patent Complications related to anesthesia: None Post-anesthesia assessment completed. No concerns Signed By: Chandni Lyman MD  
 February 13, 2019 Visit Vitals /61 Pulse 60 Temp 36.6 °C (97.8 °F) Resp 24 Ht 6' 1.5\" (1.867 m) Wt 95.3 kg (210 lb) SpO2 95% BMI 27.33 kg/m²

## 2019-02-13 NOTE — ROUTINE PROCESS
Navya Biggs.  1940  [id number]    Situation:  Verbal report received from: Stan  Procedure: Procedure(s):  COLONOSCOPY    Background:    Preoperative diagnosis: SCREENING  Postoperative diagnosis: 1.- Diverticulosis    :  Dr. Amina Fung  Assistant(s): Endoscopy Technician-1: Ciro Carl  Endoscopy RN-1: Zena Carter RN    Specimens: * No specimens in log *  H. Pylori  no    Assessment:  Intra-procedure medications     Anesthesia gave intra-procedure sedation and medications, see anesthesia flow sheet yes    Intravenous fluids: NS@ KVO     Vital signs stable     Abdominal assessment: round and soft     Recommendation:  Discharge patient per MD order.     Family or Friend   Permission to share finding with family or friend yes

## 2019-02-13 NOTE — ANESTHESIA PREPROCEDURE EVALUATION
Anesthetic History No history of anesthetic complications Review of Systems / Medical History Patient summary reviewed, nursing notes reviewed and pertinent labs reviewed Pulmonary Within defined limits Neuro/Psych Within defined limits Cardiovascular Hypertension Dysrhythmias GI/Hepatic/Renal 
  
GERD Endo/Other Within defined limits Other Findings Physical Exam 
 
Airway Mallampati: II 
TM Distance: > 6 cm Neck ROM: normal range of motion Mouth opening: Normal 
 
 Cardiovascular Regular rate and rhythm,  S1 and S2 normal,  no murmur, click, rub, or gallop Dental 
No notable dental hx Pulmonary Breath sounds clear to auscultation Abdominal 
GI exam deferred Other Findings Anesthetic Plan ASA: 3 Anesthesia type: MAC Anesthetic plan and risks discussed with: Patient

## 2019-02-13 NOTE — H&P
[address]  [address] [facility], [address] [place] [address]                 Bill Romero is a  77 y.o.  male who presents with history of polyps for screening.  .        Past Medical History:   Diagnosis Date    Anxiety     hx zoloft use    BPH (benign prostatic hypertrophy)     Bradycardia     syncope - s/p pacemaker - Dr. Lesley Franz Chest pain     neg stress test - l2/2011 - EF 61%    Colon polyps     Diverticulitis of colon     DJD (degenerative joint disease) of knee     ED (erectile dysfunction)     GERD (gastroesophageal reflux disease)     EGD - 5/11 - gastritis    Hypercholesterolemia     Hypertension     Mild cognitive impairment with memory loss     Mild pulmonary hypertension (HCC)     OA (osteoarthritis) of knee     left - Dr. Ruthy Bernheim Pacemaker     PFO with atrial septal aneurysm     Renal cysts, acquired, bilateral     S/P knee replacement     right    Screening for AAA (abdominal aortic aneurysm)     NL 5/11    SSS (sick sinus syndrome) (HCC)     Status cardiac pacemaker     Sun-damaged skin     Sunburn, blistering     Wears glasses      Past Surgical History:   Procedure Laterality Date    HX ORTHOPAEDIC      R knee replacement - 2009ish    HX PACEMAKER      HX PACEMAKER PLACEMENT      HX TONSIL AND ADENOIDECTOMY       No Known Allergies  Current Facility-Administered Medications   Medication Dose Route Frequency Provider Last Rate Last Dose    0.9% sodium chloride infusion  50 mL/hr IntraVENous CONTINUOUS Catherine Quijano MD        sodium chloride (NS) flush 5-40 mL  5-40 mL IntraVENous Q8H Catherine Quijano MD        sodium chloride (NS) flush 5-40 mL  5-40 mL IntraVENous PRN Catherine Quijano MD        midazolam (VERSED) injection 0.25-5 mg  0.25-5 mg IntraVENous Multiple Catherine Quijano MD        fentaNYL citrate (PF) injection 100 mcg  100 mcg IntraVENous Multiple Catherine Quijano MD        naloxone [facility]) injection 0.4 mg  0.4 mg IntraVENous Multiple Jose Kelley MD        flumazenil (ROMAZICON) 0.1 mg/mL injection 0.2 mg  0.2 mg IntraVENous Multiple Jose Kelley MD        [facility]) 80QW/8.2FT oral drops 80 mg  1.2 mL Oral Multiple Jose Kelley MD        atropine injection 0.5 mg  0.5 mg IntraVENous ONCE PRN Jose Kelley MD        EPINEPHrine (ADRENALIN) 0.1 mg/mL syringe 1 mg  1 mg Endoscopically ONCE PRN Jose Kelley MD         Facility-Administered Medications Ordered in Other Encounters   Medication Dose Route Frequency Provider Last Rate Last Dose    0.9% sodium chloride infusion   IntraVENous CONTINUOUS Sarah Arnett CRNA           Visit Vitals  /82   Pulse 60   Temp 98.4 °F (36.9 °C)   Resp 16   Ht 6' 1.5\" (1.867 m)   Wt 95.3 kg (210 lb)   SpO2 95%   BMI 27.33 kg/m²           PHYSICAL EXAM:  General: WD, WN. Alert, cooperative, no acute distress    HEENT: NC, Atraumatic. PERRLA, EOMI. Anicteric sclerae. Mallampati score 2  Lungs:  CTA Bilaterally. No Wheezing/Rhonchi/Rales. Heart:  Regular  rhythm,  No murmur (), No Rubs, No Gallops  Abdomen: Soft, Non distended, Non tender.  +Bowel sounds, no HSM  Extremities: No c/c/e  Neurologic:  CN 2-12 gi, Alert and oriented X 3. No acute neurological distress   Psych:   Good insight. Not anxious nor agitated. Plan:   Endoscopic procedure with MAC.     Farheen Massey MD  2/13/2019  9:02 AM

## 2019-02-13 NOTE — PROGRESS NOTES

## 2019-04-16 DIAGNOSIS — N52.9 ERECTILE DYSFUNCTION, UNSPECIFIED ERECTILE DYSFUNCTION TYPE: ICD-10-CM

## 2019-04-16 NOTE — TELEPHONE ENCOUNTER
Medication refill request:    Last Office Visit:  November 05, 2018   Next Office Visit:    Future Appointments   Date Time Provider Jessica Garcia   5/6/2019  8:00 AM Emy Reddy MD [id number] verified. YES    Patient requesting 30 day supply.

## 2019-04-18 DIAGNOSIS — N52.9 ERECTILE DYSFUNCTION, UNSPECIFIED ERECTILE DYSFUNCTION TYPE: ICD-10-CM

## 2019-04-18 RX ORDER — SILDENAFIL CITRATE 20 MG/1
TABLET ORAL
Qty: 20 TAB | Refills: 2 | OUTPATIENT
Start: 2019-04-18

## 2019-04-18 RX ORDER — SILDENAFIL CITRATE 20 MG/1
TABLET ORAL
Qty: 20 TAB | Refills: 2 | Status: SHIPPED | OUTPATIENT
Start: 2019-04-18

## 2019-05-06 ENCOUNTER — HOSPITAL ENCOUNTER (OUTPATIENT)
Dept: LAB | Age: 79
Discharge: HOME | End: 2019-05-06
Payer: MEDICARE

## 2019-05-06 ENCOUNTER — OFFICE VISIT (OUTPATIENT)
Dept: INTERNAL MEDICINE CLINIC | Age: 79
End: 2019-05-06

## 2019-05-06 VITALS
OXYGEN SATURATION: 97 % | HEART RATE: 60 BPM | BODY MASS INDEX: 27.9 KG/M2 | RESPIRATION RATE: 10 BRPM | HEIGHT: 74 IN | WEIGHT: 217.4 LBS | SYSTOLIC BLOOD PRESSURE: 154 MMHG | DIASTOLIC BLOOD PRESSURE: 81 MMHG | TEMPERATURE: 98 F

## 2019-05-06 DIAGNOSIS — I10 ESSENTIAL HYPERTENSION: Primary | ICD-10-CM

## 2019-05-06 DIAGNOSIS — M17.9 OSTEOARTHRITIS OF KNEE, UNSPECIFIED LATERALITY, UNSPECIFIED OSTEOARTHRITIS TYPE: ICD-10-CM

## 2019-05-06 DIAGNOSIS — E55.9 VITAMIN D DEFICIENCY: ICD-10-CM

## 2019-05-06 DIAGNOSIS — G31.84 MILD COGNITIVE IMPAIRMENT WITH MEMORY LOSS: ICD-10-CM

## 2019-05-06 DIAGNOSIS — N40.0 BENIGN PROSTATIC HYPERPLASIA WITHOUT LOWER URINARY TRACT SYMPTOMS: ICD-10-CM

## 2019-05-06 DIAGNOSIS — Z23 ENCOUNTER FOR IMMUNIZATION: ICD-10-CM

## 2019-05-06 DIAGNOSIS — Z95.0 STATUS CARDIAC PACEMAKER: ICD-10-CM

## 2019-05-06 DIAGNOSIS — R73.9 HYPERGLYCEMIA: ICD-10-CM

## 2019-05-06 DIAGNOSIS — E78.00 HYPERCHOLESTEROLEMIA: ICD-10-CM

## 2019-05-06 PROCEDURE — 85025 COMPLETE CBC W/AUTO DIFF WBC: CPT

## 2019-05-06 PROCEDURE — 83036 HEMOGLOBIN GLYCOSYLATED A1C: CPT

## 2019-05-06 PROCEDURE — 80053 COMPREHEN METABOLIC PANEL: CPT

## 2019-05-06 PROCEDURE — 80061 LIPID PANEL: CPT

## 2019-05-06 PROCEDURE — 82550 ASSAY OF CK (CPK): CPT

## 2019-05-06 PROCEDURE — 82306 VITAMIN D 25 HYDROXY: CPT

## 2019-05-06 RX ORDER — RAMIPRIL 10 MG/1
10 CAPSULE ORAL DAILY
Qty: 90 CAP | Refills: 1 | Status: SHIPPED | OUTPATIENT
Start: 2019-05-06 | End: 2019-08-19 | Stop reason: SDUPTHER

## 2019-05-06 NOTE — PROGRESS NOTES
HPI: 
Presents for f/u HTN, lipids, etc 
 
Ran out of ramipril Inquires re: continuing it. When explained that BP not adequately controlled today without it, he agrees to resume it Pt reports he is not sure whether he is taking the crestor or not. Pt using a pill box for ASA, but not his other meds. Fasting today for labs No significant BPH sx 
 
Playing pickle ball regularly. Pt reports that he is a  and keeps good track of his financial affairs. Past medical, Social, and Family history reviewed Prior to Admission medications Medication Sig Start Date End Date Taking? Authorizing Provider  
sildenafil, antihypertensive, (REVATIO) 20 mg tablet TAKE 1 TABLET BY MOUTH EVERY DAY AS NEEDED 4/18/19  Yes Jana Zelaya MD  
docosahexanoic acid/epa (FISH OIL PO) Take 2,400 mg by mouth nightly. Yes Provider, Historical  
cholecalciferol (VITAMIN D3) 1,000 unit cap Take 1,000 Units by mouth daily. Yes Provider, Historical  
ramipril (ALTACE) 10 mg capsule Take 10 mg by mouth every evening. 4/13/18  Yes Provider, Historical  
cyanocobalamin (VITAMIN B-12) 1,000 mcg tablet Take 1,000 mcg by mouth daily. Yes Provider, Historical  
acetaminophen (TYLENOL EXTRA STRENGTH) 500 mg tablet Take 500 mg by mouth as needed for Pain. Yes Provider, Historical  
hydrochlorothiazide (HYDRODIURIL) 12.5 mg tablet Take 1 Tab by mouth daily. 8/30/16  Yes Provider, Historical  
CRESTOR 10 mg tablet TAKE 1 TABLET BY MOUTH EVERY NIGHT AT BEDTIME 7/5/15  Yes Jana Zelaya MD  
aspirin (ASPIRIN) 325 mg tablet Take 325 mg by mouth daily. Yes Provider, Historical  
  
 
 
ROS Complete ROS reviewed and negative or stable except as noted in HPI. Physical Exam  
Constitutional: He is oriented to person, place, and time. He appears well-nourished. No distress. HENT:  
Head: Normocephalic and atraumatic.   
Mouth/Throat: Oropharynx is clear and moist.  
 Eyes: Pupils are equal, round, and reactive to light. EOM are normal. No scleral icterus. Neck: Normal range of motion. Neck supple. No JVD present. Cardiovascular: Normal rate, regular rhythm and normal heart sounds. Exam reveals no gallop and no friction rub. No murmur heard. Pulmonary/Chest: Effort normal and breath sounds normal. No respiratory distress. He has no wheezes. He has no rales. Abdominal: Soft. He exhibits no distension. There is no tenderness. Musculoskeletal: Normal range of motion. He exhibits no edema. Lymphadenopathy:  
  He has no cervical adenopathy. Neurological: He is alert and oriented to person, place, and time. He exhibits normal muscle tone. Coordination normal.  
Skin: Skin is warm. No rash noted. Psychiatric: He has a normal mood and affect. Nursing note and vitals reviewed. Prior labs reviewed. Assessment/Plan: ICD-10-CM ICD-9-CM 1. Essential hypertension I10 401.9 CBC WITH AUTOMATED DIFF 2. Hypercholesterolemia E78.00 272.0 CK  
   LIPID PANEL  
   METABOLIC PANEL, COMPREHENSIVE 3. Benign prostatic hyperplasia without lower urinary tract symptoms N40.0 600.00   
4. Status cardiac pacemaker Z95.0 V45.01   
5. Osteoarthritis of knee, unspecified laterality, unspecified osteoarthritis type M17.10 715.36   
6. Mild cognitive impairment with memory loss G31.84 331.83   
  780.93   
7. Vitamin D deficiency E55.9 268.9 VITAMIN D, 25 HYDROXY 8. Hyperglycemia R73.9 790.29 HEMOGLOBIN A1C WITH EAG  
9. Encounter for immunization Z23 V03.89 diph,Pertuss,Acell,,Tet Vac-PF (ADACEL) 2 Lf-(2.5-5-3-5 mcg)-5Lf/0.5 mL susp  
   varicella-zoster recombinant, PF, (SHINGRIX, PF,) 50 mcg/0.5 mL susr injection Follow-up and Dispositions · Return in about 3 months (around 8/6/2019), or if symptoms worsen or fail to improve, for blood pressure. results and schedule of future studies reviewed with patient 
reviewed diet, exercise and weight cardiovascular risk and specific lipid/LDL goals reviewed 
reviewed medications and side effects in detail Refill ramipril Defer PSA screening No further colonoscopies per GI Fasting labs today Encouraged Shingrix and Tdap at pharmacy - Rx's sent to help facilitate dosing. Encouraged pt to consider reaching out to family, children to be aware of potential need to assist with medical and financial affairs in the future.

## 2019-05-06 NOTE — PROGRESS NOTES
Room 13 Chief Complaint Patient presents with  Blood Pressure Check f/u  Cholesterol Problem f/u Patient is out of Ramipril, but wants to ask if provider wants him to continue medicine. 1. Have you been to the ER, urgent care clinic since your last visit? Hospitalized since your last visit? Yes When: March 2019 Where: [address] Reason for visit: Chest Pain 2. Have you seen or consulted any other health care providers outside of the [address] [place] [address] since your last visit? Include any pap smears or colon screening. No 
 
Health Maintenance Due Topic Date Due  
 DTaP/Tdap/Td series (1 - Tdap) 09/09/1960  Shingrix Vaccine Age 50> (1 of 2) 09/09/1989  GLAUCOMA SCREENING Q2Y  05/10/2018 Patient believes he received Shingrix at [place]. Will send for record. Patient would like to hold off on TDAP. 3 most recent PHQ Screens 5/6/2019 Little interest or pleasure in doing things Not at all Feeling down, depressed, irritable, or hopeless Not at all Total Score PHQ 2 0 Fall Risk Assessment, last 12 mths 5/6/2019 Able to walk? Yes Fall in past 12 months?  No

## 2019-05-07 LAB
25(OH)D3+25(OH)D2 SERPL-MCNC: 29 NG/ML (ref 30–100)
ALBUMIN SERPL-MCNC: 4.1 G/DL (ref 3.5–4.8)
ALBUMIN/GLOB SERPL: 1.7 {RATIO} (ref 1.2–2.2)
ALP SERPL-CCNC: 63 IU/L (ref 39–117)
ALT SERPL-CCNC: 18 IU/L (ref 0–44)
AST SERPL-CCNC: 20 IU/L (ref 0–40)
BASOPHILS # BLD AUTO: 0 X10E3/UL (ref 0–0.2)
BASOPHILS NFR BLD AUTO: 0 %
BILIRUB SERPL-MCNC: 0.4 MG/DL (ref 0–1.2)
BUN SERPL-MCNC: 13 MG/DL (ref 8–27)
BUN/CREAT SERPL: 11 (ref 10–24)
CALCIUM SERPL-MCNC: 9 MG/DL (ref 8.6–10.2)
CHLORIDE SERPL-SCNC: 106 MMOL/L (ref 96–106)
CHOLEST SERPL-MCNC: 157 MG/DL (ref 100–199)
CK SERPL-CCNC: 72 U/L (ref 24–204)
CO2 SERPL-SCNC: 24 MMOL/L (ref 20–29)
CREAT SERPL-MCNC: 1.16 MG/DL (ref 0.76–1.27)
EOSINOPHIL # BLD AUTO: 0.3 X10E3/UL (ref 0–0.4)
EOSINOPHIL NFR BLD AUTO: 5 %
ERYTHROCYTE [DISTWIDTH] IN BLOOD BY AUTOMATED COUNT: 13.2 % (ref 12.3–15.4)
EST. AVERAGE GLUCOSE BLD GHB EST-MCNC: 114 MG/DL
GLOBULIN SER CALC-MCNC: 2.4 G/DL (ref 1.5–4.5)
GLUCOSE SERPL-MCNC: 105 MG/DL (ref 65–99)
HBA1C MFR BLD: 5.6 % (ref 4.8–5.6)
HCT VFR BLD AUTO: 46 % (ref 37.5–51)
HDLC SERPL-MCNC: 33 MG/DL
HGB BLD-MCNC: 15.2 G/DL (ref 13–17.7)
IMM GRANULOCYTES # BLD AUTO: 0 X10E3/UL (ref 0–0.1)
IMM GRANULOCYTES NFR BLD AUTO: 0 %
LDLC SERPL CALC-MCNC: 100 MG/DL (ref 0–99)
LYMPHOCYTES # BLD AUTO: 1.5 X10E3/UL (ref 0.7–3.1)
LYMPHOCYTES NFR BLD AUTO: 24 %
MCH RBC QN AUTO: 31.3 PG (ref 26.6–33)
MCHC RBC AUTO-ENTMCNC: 33 G/DL (ref 31.5–35.7)
MCV RBC AUTO: 95 FL (ref 79–97)
MONOCYTES # BLD AUTO: 0.7 X10E3/UL (ref 0.1–0.9)
MONOCYTES NFR BLD AUTO: 10 %
NEUTROPHILS # BLD AUTO: 3.9 X10E3/UL (ref 1.4–7)
NEUTROPHILS NFR BLD AUTO: 61 %
PLATELET # BLD AUTO: 184 X10E3/UL (ref 150–379)
POTASSIUM SERPL-SCNC: 5 MMOL/L (ref 3.5–5.2)
PROT SERPL-MCNC: 6.5 G/DL (ref 6–8.5)
RBC # BLD AUTO: 4.86 X10E6/UL (ref 4.14–5.8)
SODIUM SERPL-SCNC: 143 MMOL/L (ref 134–144)
TRIGL SERPL-MCNC: 120 MG/DL (ref 0–149)
VLDLC SERPL CALC-MCNC: 24 MG/DL (ref 5–40)
WBC # BLD AUTO: 6.4 X10E3/UL (ref 3.4–10.8)

## 2019-05-08 ENCOUNTER — DOCUMENTATION ONLY (OUTPATIENT)
Dept: INTERNAL MEDICINE CLINIC | Age: 79
End: 2019-05-08

## 2019-07-05 ENCOUNTER — OFFICE VISIT (OUTPATIENT)
Dept: INTERNAL MEDICINE CLINIC | Age: 79
End: 2019-07-05

## 2019-07-05 VITALS
SYSTOLIC BLOOD PRESSURE: 137 MMHG | RESPIRATION RATE: 16 BRPM | DIASTOLIC BLOOD PRESSURE: 76 MMHG | OXYGEN SATURATION: 94 % | WEIGHT: 212.6 LBS | BODY MASS INDEX: 27.28 KG/M2 | HEIGHT: 74 IN | TEMPERATURE: 98 F | HEART RATE: 64 BPM

## 2019-07-05 DIAGNOSIS — H60.332 CHRONIC SWIMMER'S EAR OF LEFT SIDE: Primary | ICD-10-CM

## 2019-07-05 DIAGNOSIS — H61.23 EXCESSIVE CERUMEN IN BOTH EAR CANALS: ICD-10-CM

## 2019-07-05 RX ORDER — ACETIC ACID 20.65 MG/ML
4 SOLUTION AURICULAR (OTIC) 3 TIMES DAILY
Qty: 15 ML | Refills: 0 | Status: SHIPPED | OUTPATIENT
Start: 2019-07-05 | End: 2019-07-12

## 2019-07-05 NOTE — PATIENT INSTRUCTIONS
Swimmer's Ear: Care Instructions Your Care Instructions Swimmer's ear (otitis externa) is inflammation or infection of the ear canal. This is the passage that leads from the outer ear to the eardrum. Any water, sand, or other debris that gets into the ear canal and stays there can cause swimmer's ear. Putting cotton swabs or other items in the ear to clean it can also cause this problem. Swimmer's ear can be very painful. But you can treat the pain and infection with medicines. You should feel better in a few days. Follow-up care is a key part of your treatment and safety. Be sure to make and go to all appointments, and call your doctor if you are having problems. It's also a good idea to know your test results and keep a list of the medicines you take. How can you care for yourself at home? Cleaning and care · Use antibiotic drops as your doctor directs. · Do not insert ear drops (other than the antibiotic ear drops) or anything else into the ear unless your doctor has told you to. · Avoid getting water in the ear until the problem clears up. Use cotton lightly coated with petroleum jelly as an earplug. Do not use plastic earplugs. · Use a hair dryer set on low to carefully dry the ear after you shower. · To ease ear pain, hold a warm washcloth against your ear. · Take pain medicines exactly as directed. ? If the doctor gave you a prescription medicine for pain, take it as prescribed. ? If you are not taking a prescription pain medicine, ask your doctor if you can take an over-the-counter medicine. Inserting ear drops · Warm the drops to body temperature by rolling the container in your hands. Or you can place it in a cup of warm water for a few minutes. · Lie down, with your ear facing up. · Place drops inside the ear. Follow your doctor's instructions (or the directions on the label) for how many drops to use.  Gently wiggle the outer ear or pull the ear up and back to help the drops get into the ear. · It's important to keep the liquid in the ear canal for 3 to 5 minutes. When should you call for help? Call your doctor now or seek immediate medical care if: 
  · You have a new or higher fever.  
  · You have new or worse pain, swelling, warmth, or redness around or behind your ear.  
  · You have new or increasing pus or blood draining from your ear.  
 Watch closely for changes in your health, and be sure to contact your doctor if: 
  · You are not getting better after 2 days (48 hours). Where can you learn more? Go to http://manpreet-mirtha.info/. Enter C706 in the search box to learn more about \"Swimmer's Ear: Care Instructions. \" Current as of: March 27, 2018 Content Version: 11.9 © 3348-8977 [employer]. Care instructions adapted under license by [employer] (which disclaims liability or warranty for this information). If you have questions about a medical condition or this instruction, always ask your healthcare professional. [place] [address] any warranty or liability for your use of this information. Earwax Blockage: Care Instructions Your Care Instructions Earwax is a natural substance that protects the ear canal. Normally, earwax drains from the ears and does not cause problems. Sometimes earwax builds up and hardens. Earwax blockage (also called cerumen impaction) can cause some loss of hearing and pain. When wax is tightly packed, you will need to have your doctor remove it. Follow-up care is a key part of your treatment and safety. Be sure to make and go to all appointments, and call your doctor if you are having problems. It's also a good idea to know your test results and keep a list of the medicines you take. How can you care for yourself at home?  
· Do not try to remove earwax with cotton swabs, fingers, or other objects. This can make the blockage worse and damage the eardrum. · If your doctor recommends that you try to remove earwax at home: ? Soften and loosen the earwax with warm mineral oil. You also can try hydrogen peroxide mixed with an equal amount of room temperature water. Place 2 drops of the fluid, warmed to body temperature, in the ear two times a day for up to 5 days. ? Once the wax is loose and soft, all that is usually needed to remove it from the ear canal is a gentle, warm shower. Direct the water into the ear, then tip your head to let the earwax drain out. Dry your ear thoroughly with a hair dryer set on low. Hold the dryer several inches from your ear. ? If the warm mineral oil and shower do not work, use an over-the-counter wax softener. Read and follow all instructions on the label. After using the wax softener, use an ear syringe to gently flush the ear. Make sure the flushing solution is body temperature. Cool or hot fluids in the ear can cause dizziness. When should you call for help? Call your doctor now or seek immediate medical care if: 
  · Pus or blood drains from your ear.  
  · Your ears are ringing or feel full.  
  · You have a loss of hearing.  
 Watch closely for changes in your health, and be sure to contact your doctor if: 
  · You have pain or reduced hearing after 1 week of home treatment.  
  · You have any new symptoms, such as nausea or balance problems. Where can you learn more? Go to http://manpreet-mirtha.info/. Enter E845 in the search box to learn more about \"Earwax Blockage: Care Instructions. \" Current as of: September 23, 2018 Content Version: 11.9 © 4859-6722 [employer]. Care instructions adapted under license by [employer] (which disclaims liability or warranty for this information).  If you have questions about a medical condition or this instruction, always ask your healthcare professional. [employer] disclaims any warranty or liability for your use of this information.

## 2019-07-05 NOTE — PROGRESS NOTES
RM 2    Chief Complaint   Patient presents with    Ear Fullness     both ears mostly left ear      1. Have you been to the ER, urgent care clinic since your last visit? Hospitalized since your last visit? No    2. Have you seen or consulted any other health care providers outside of the [address] [place] [address] since your last visit? Include any pap smears or colon screening.  No    Health Maintenance Due   Topic Date Due    DTaP/Tdap/Td series (1 - Tdap) 09/09/1960    Shingrix Vaccine Age 50> (1 of 2) 09/09/1989    GLAUCOMA SCREENING Q2Y  05/10/2018       Learning Assessment 6/29/2015   PRIMARY LEARNER Patient   HIGHEST LEVEL OF EDUCATION - PRIMARY LEARNER  -   BARRIERS PRIMARY LEARNER NONE   PRIMARY LANGUAGE ENGLISH   LEARNER PREFERENCE PRIMARY DEMONSTRATION     -     -     -   ANSWERED BY Allen Mcduffie   RELATIONSHIP SELF

## 2019-07-05 NOTE — PROGRESS NOTES
ACUTE VISIT     HPI:   Patricia Higginbotham is a 77 y.o. male, he presents today for:     Left ear stopped up. In the past has had some wax concerning. ROS: no fever, no ear pain, no congestion,    Medications used for acute illness: none    Current Outpatient Medications on File Prior to Visit   Medication Sig    ramipril (ALTACE) 10 mg capsule Take 1 Cap by mouth daily.  sildenafil, antihypertensive, (REVATIO) 20 mg tablet TAKE 1 TABLET BY MOUTH EVERY DAY AS NEEDED    docosahexanoic acid/epa (FISH OIL PO) Take 2,400 mg by mouth nightly.  cholecalciferol (VITAMIN D3) 1,000 unit cap Take 1,000 Units by mouth daily.  cyanocobalamin (VITAMIN B-12) 1,000 mcg tablet Take 1,000 mcg by mouth daily.  acetaminophen (TYLENOL EXTRA STRENGTH) 500 mg tablet Take 500 mg by mouth as needed for Pain.  hydrochlorothiazide (HYDRODIURIL) 12.5 mg tablet Take 1 Tab by mouth daily.  CRESTOR 10 mg tablet TAKE 1 TABLET BY MOUTH EVERY NIGHT AT BEDTIME    aspirin (ASPIRIN) 325 mg tablet Take 325 mg by mouth daily. No current facility-administered medications on file prior to visit. No Known Allergies    PMH/PSH/FH: reviewed and updated    Sochx:   reports that he quit smoking about 55 years ago. His smoking use included cigarettes. He has never used smokeless tobacco. He reports that he drinks about 4.2 oz of alcohol per week. He reports that he does not use drugs. PE:  Blood pressure 137/76, pulse 64, temperature 98 °F (36.7 °C), temperature source Oral, resp. rate 16, height 6' 1.5\" (1.867 m), weight 212 lb 9.6 oz (96.4 kg), SpO2 94 %. Body mass index is 27.67 kg/m². Physical Exam   Constitutional: He is oriented to person, place, and time. He appears well-developed and well-nourished. No distress. HENT:   Head: Normocephalic. Mouth/Throat: Oropharynx is clear and moist.   Ceruminosis bilaterally in ears.    Post-irrigation, wet mild macerated appearing canal normal TM bilaterally   Eyes: Pupils are equal, round, and reactive to light. Conjunctivae are normal.   Neck: Neck supple. Cardiovascular: Normal rate. Pulmonary/Chest: Effort normal.   Neurological: He is alert and oriented to person, place, and time. Skin: No rash noted. Nursing note and vitals reviewed. Labs:  No results found for any visits on 07/05/19. A/P  Willa Ghotra. Srinath Guamanhelga. was seen today for had concerns including Ear Fullness (both ears mostly left ear ). .  The diagnosis and plan was discussed including:        ICD-10-CM ICD-9-CM    1. 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 Excessive cerumen in both ear canals H61.23 380.4 REMOVAL IMPACTED CERUMEN IRRIGATION/LVG UNILAT     Successful irrigation of ears, topical vosol prescribed due to mild maceration of canal and to reduce chance of developing external otitis. - I advised him to call back or return to office if symptoms worsen/change/persist.  - He was given AVS and expressed understanding with the diagnosis and plan as discussed. Follow-up and Dispositions    · Return if symptoms worsen or fail to improve.

## 2019-08-19 ENCOUNTER — OFFICE VISIT (OUTPATIENT)
Dept: INTERNAL MEDICINE CLINIC | Age: 79
End: 2019-08-19

## 2019-08-19 ENCOUNTER — HOSPITAL ENCOUNTER (OUTPATIENT)
Dept: LAB | Age: 79
Discharge: HOME | End: 2019-08-19
Payer: MEDICARE

## 2019-08-19 VITALS
HEART RATE: 60 BPM | WEIGHT: 213 LBS | TEMPERATURE: 98 F | DIASTOLIC BLOOD PRESSURE: 71 MMHG | SYSTOLIC BLOOD PRESSURE: 125 MMHG | RESPIRATION RATE: 18 BRPM | OXYGEN SATURATION: 95 % | HEIGHT: 74 IN | BODY MASS INDEX: 27.34 KG/M2

## 2019-08-19 DIAGNOSIS — I25.10 CORONARY ARTERY DISEASE INVOLVING NATIVE CORONARY ARTERY OF NATIVE HEART WITHOUT ANGINA PECTORIS: ICD-10-CM

## 2019-08-19 DIAGNOSIS — Z91.14 MEDICATION NONCOMPLIANCE DUE TO COGNITIVE IMPAIRMENT: ICD-10-CM

## 2019-08-19 DIAGNOSIS — E53.8 LOW VITAMIN B12 LEVEL: ICD-10-CM

## 2019-08-19 DIAGNOSIS — I49.5 SICK SINUS SYNDROME (HCC): ICD-10-CM

## 2019-08-19 DIAGNOSIS — Z95.0 STATUS CARDIAC PACEMAKER: ICD-10-CM

## 2019-08-19 DIAGNOSIS — G31.84 MILD COGNITIVE IMPAIRMENT WITH MEMORY LOSS: ICD-10-CM

## 2019-08-19 DIAGNOSIS — E78.00 HYPERCHOLESTEROLEMIA: ICD-10-CM

## 2019-08-19 DIAGNOSIS — I10 ESSENTIAL HYPERTENSION: Primary | ICD-10-CM

## 2019-08-19 PROBLEM — R41.9 MEDICATION NONCOMPLIANCE DUE TO COGNITIVE IMPAIRMENT: Status: ACTIVE | Noted: 2019-08-19

## 2019-08-19 PROBLEM — Z91.148 MEDICATION NONCOMPLIANCE DUE TO COGNITIVE IMPAIRMENT: Status: ACTIVE | Noted: 2019-08-19

## 2019-08-19 PROCEDURE — 36415 COLL VENOUS BLD VENIPUNCTURE: CPT

## 2019-08-19 PROCEDURE — 82607 VITAMIN B-12: CPT

## 2019-08-19 PROCEDURE — 80069 RENAL FUNCTION PANEL: CPT

## 2019-08-19 RX ORDER — ROSUVASTATIN CALCIUM 10 MG/1
TABLET, COATED ORAL
Qty: 90 TAB | Refills: 3 | Status: SHIPPED | OUTPATIENT
Start: 2019-08-19 | End: 2021-08-04 | Stop reason: SDUPTHER

## 2019-08-19 RX ORDER — LANOLIN ALCOHOL/MO/W.PET/CERES
1000 CREAM (GRAM) TOPICAL DAILY
Qty: 90 TAB | Refills: 3 | Status: SHIPPED | OUTPATIENT
Start: 2019-08-19

## 2019-08-19 RX ORDER — RAMIPRIL 10 MG/1
10 CAPSULE ORAL DAILY
Qty: 90 CAP | Refills: 3 | Status: SHIPPED | OUTPATIENT
Start: 2019-08-19 | End: 2021-03-18 | Stop reason: SDUPTHER

## 2019-08-19 RX ORDER — DONEPEZIL HYDROCHLORIDE 5 MG/1
5 TABLET, FILM COATED ORAL
Qty: 90 TAB | Refills: 0 | Status: SHIPPED | OUTPATIENT
Start: 2019-08-19 | End: 2019-10-21 | Stop reason: SDUPTHER

## 2019-08-19 NOTE — PROGRESS NOTES
HPI   Patricia Zamudio. is a 77 y.o. male, he presents today for:    Left ear with ongoing feeling of being \"stopped up\". Not sure if he has been taking medications, doesn't honestly remember. Called [facility] only medicines they hae filled were the ear drops and sildenafil. [facility] only meds filled in last year were ramipril on May 7th and rosouvastatin October 31st.     PMH/PSH: reviewed and updated  Sochx:  reports that he quit smoking about 55 years ago. His smoking use included cigarettes. He has never used smokeless tobacco. He reports that he drinks about 7.0 standard drinks of alcohol per week. He reports that he does not use drugs. Famhx: reviewed and updated     All: No Known Allergies  Med:   Current Outpatient Medications   Medication Sig    ramipril (ALTACE) 10 mg capsule Take 1 Cap by mouth daily.  sildenafil, antihypertensive, (REVATIO) 20 mg tablet TAKE 1 TABLET BY MOUTH EVERY DAY AS NEEDED    docosahexanoic acid/epa (FISH OIL PO) Take 2,400 mg by mouth nightly.  cyanocobalamin (VITAMIN B-12) 1,000 mcg tablet Take 1,000 mcg by mouth daily.  acetaminophen (TYLENOL EXTRA STRENGTH) 500 mg tablet Take 500 mg by mouth as needed for Pain.  hydrochlorothiazide (HYDRODIURIL) 12.5 mg tablet Take 1 Tab by mouth daily.  CRESTOR 10 mg tablet TAKE 1 TABLET BY MOUTH EVERY NIGHT AT BEDTIME    aspirin (ASPIRIN) 325 mg tablet Take 325 mg by mouth daily.  cholecalciferol (VITAMIN D3) 1,000 unit cap Take 1,000 Units by mouth daily. No current facility-administered medications for this visit. Review of Systems   Constitutional: Negative for chills, fever and malaise/fatigue. Respiratory: Negative for shortness of breath. Cardiovascular: Negative for chest pain. PE:  Blood pressure 125/71, pulse 60, temperature 98 °F (36.7 °C), temperature source Oral, resp. rate 18, height 6' 1.5\" (1.867 m), weight 213 lb (96.6 kg), SpO2 95 %.   Body mass index is 27.72 kg/m². Physical Exam   Constitutional: He is oriented to person, place, and time. He appears well-developed and well-nourished. No distress. HENT:   Head: Normocephalic. Mouth/Throat: Oropharynx is clear and moist.   Eyes: Pupils are equal, round, and reactive to light. Conjunctivae and EOM are normal.   Neck: Neck supple. Cardiovascular: Normal rate, regular rhythm, normal heart sounds and intact distal pulses. Pulmonary/Chest: Effort normal and breath sounds normal.   Abdominal: Soft. Musculoskeletal: He exhibits no edema. Neurological: He is alert and oriented to person, place, and time. Nursing note and vitals reviewed. Labs:   No results found for any visits on 08/19/19. A/P:  77 y.o. male    ICD-10-CM ICD-9-CM    1. Essential hypertension I10 401.9 RENAL FUNCTION PANEL      ramipril (ALTACE) 10 mg capsule   2. Hypercholesterolemia E78.00 272.0 rosuvastatin (CRESTOR) 10 mg tablet   3. Status cardiac pacemaker Z95.0 V45.01    4. Mild cognitive impairment with memory loss G31.84 331.83 donepezil (ARICEPT) 5 mg tablet     780.93    5. Low vitamin B12 level E53.8 266.2 VITAMIN B12      cyanocobalamin (VITAMIN B-12) 1,000 mcg tablet   6. Sick sinus syndrome (HCC) I49.5 427.81    7. Coronary artery disease involving native coronary artery of native heart without angina pectoris I25.10 414.01 REFERRAL TO CARDIOLOGY   8. Medication noncompliance due to cognitive impairment Z91.14 V15.81        Memory impairment:  Recognized in 2016. Low b12. But ongoing memory difficulty. Not remembering to take medications. - Trial chlinesterase inhibitor. - start pill bottle. Monitoring   - see patient instructions for details       CAD/SSS: continue to follow with cardiology for biannual monritoring. Referral provided for insurance purpose   - BP well controlled with ACE,    - continue statin and asa at this time.     - He was given AVS and expressed understanding with the diagnosis and plan as discussed. No future appointments.     Has not been filled since 2018

## 2019-08-19 NOTE — PATIENT INSTRUCTIONS
1) I called Estuardo and Alistair to clarify medication list. It seems you may not have been consistently taking several of your medications. - if possible: please use pill bottle to organize medications, and have someone double check your medications at least 1 time per week. - check and make sure that your medications match the list printed today on your AVS.      2) I would like you to start a new medication called donepezil, this is to hopefully help your memory. If you are tolerating it well, we will increase the dose in 2-3 months. The most common side effect is nausea. 3) Continue to use ear drops as needed in ears for wetness or irritation.

## 2019-08-19 NOTE — PROGRESS NOTES
RM 1    Chief Complaint   Patient presents with    Follow-up     Blood bressure      1. Have you been to the ER, urgent care clinic since your last visit? Hospitalized since your last visit? No    2. Have you seen or consulted any other health care providers outside of the [address] [place] [address] since your last visit? Include any pap smears or colon screening.  No    Health Maintenance Due   Topic Date Due    DTaP/Tdap/Td series (1 - Tdap) 09/09/1960    Shingrix Vaccine Age 50> (1 of 2) 09/09/1989    GLAUCOMA SCREENING Q2Y  05/10/2018    Influenza Age 9 to Adult  08/01/2019       Learning Assessment 6/29/2015   PRIMARY LEARNER Patient   HIGHEST LEVEL OF EDUCATION - PRIMARY LEARNER  -   BARRIERS PRIMARY LEARNER NONE   PRIMARY LANGUAGE ENGLISH   LEARNER PREFERENCE PRIMARY DEMONSTRATION     -     -     -   ANSWERED BY Allen Davis   RELATIONSHIP SELF   '

## 2019-08-20 LAB
ALBUMIN SERPL-MCNC: 4.2 G/DL (ref 3.5–4.8)
BUN SERPL-MCNC: 16 MG/DL (ref 8–27)
BUN/CREAT SERPL: 14 (ref 10–24)
CALCIUM SERPL-MCNC: 8.8 MG/DL (ref 8.6–10.2)
CHLORIDE SERPL-SCNC: 103 MMOL/L (ref 96–106)
CO2 SERPL-SCNC: 22 MMOL/L (ref 20–29)
CREAT SERPL-MCNC: 1.13 MG/DL (ref 0.76–1.27)
GLUCOSE SERPL-MCNC: 83 MG/DL (ref 65–99)
PHOSPHATE SERPL-MCNC: 3.9 MG/DL (ref 2.5–4.5)
POTASSIUM SERPL-SCNC: 5 MMOL/L (ref 3.5–5.2)
SODIUM SERPL-SCNC: 141 MMOL/L (ref 134–144)
VIT B12 SERPL-MCNC: 421 PG/ML (ref 232–1245)

## 2019-08-21 NOTE — PROGRESS NOTES
Normal vitamin B12 and kidney function. No immediate changes indicated from these results. Congratulations.    Lab letter generated

## 2019-10-21 ENCOUNTER — OFFICE VISIT (OUTPATIENT)
Dept: INTERNAL MEDICINE CLINIC | Age: 79
End: 2019-10-21

## 2019-10-21 VITALS
RESPIRATION RATE: 15 BRPM | TEMPERATURE: 97.7 F | WEIGHT: 212 LBS | BODY MASS INDEX: 27.21 KG/M2 | HEIGHT: 74 IN | OXYGEN SATURATION: 96 % | SYSTOLIC BLOOD PRESSURE: 131 MMHG | HEART RATE: 70 BPM | DIASTOLIC BLOOD PRESSURE: 69 MMHG

## 2019-10-21 DIAGNOSIS — F03.90 DEMENTIA WITHOUT BEHAVIORAL DISTURBANCE, UNSPECIFIED DEMENTIA TYPE: Primary | ICD-10-CM

## 2019-10-21 DIAGNOSIS — Z23 ENCOUNTER FOR IMMUNIZATION: ICD-10-CM

## 2019-10-21 DIAGNOSIS — R44.1 HALLUCINATION, VISUAL: ICD-10-CM

## 2019-10-21 RX ORDER — HYDROCHLOROTHIAZIDE 12.5 MG/1
12.5 TABLET ORAL DAILY
COMMUNITY
End: 2021-03-08 | Stop reason: SDUPTHER

## 2019-10-21 RX ORDER — DONEPEZIL HYDROCHLORIDE 10 MG/1
10 TABLET, FILM COATED ORAL
Qty: 90 TAB | Refills: 1 | Status: SHIPPED | OUTPATIENT
Start: 2019-10-21 | End: 2021-03-02 | Stop reason: SDUPTHER

## 2019-10-21 RX ORDER — DONEPEZIL HYDROCHLORIDE 5 MG/1
10 TABLET, FILM COATED ORAL
Qty: 90 TAB | Refills: 0 | Status: SHIPPED | OUTPATIENT
Start: 2019-10-21 | End: 2019-10-21 | Stop reason: SDUPTHER

## 2019-10-21 NOTE — PROGRESS NOTES
[place]   Avinash Morton. is a [de-identified] y.o. male, he presents today for:    [de-identified]year old man with history of HTN, CAD, HLD    Reports that he woke up and was seeing some visual hallucinations. Lasted a few minutes. Lives with wife. She is doing well. Supportive. Reports that he is sleeping well. Sometimes sleepy during the day. Drinks a beer a day. Not interested in cutting back. No recent falls. No falls in the past year. Continues to play [employer]. Occasional     Worked for a [employer] in [place] [id number]. Wife Lisha Dutton reports that patient's mother had dementia. No specific tremor or gait changes. No bladder contienence changes. No falls. Wife notes increased anxiety but not major personality changes. PMH/PSH: reviewed and updated  Sochx:  reports that he quit smoking about 55 years ago. His smoking use included cigarettes. He has never used smokeless tobacco. He reports that he drinks about 7.0 standard drinks of alcohol per week. He reports that he does not use drugs. Famhx: reviewed and updated     All: No Known Allergies  Med:   Current Outpatient Medications   Medication Sig    hydroCHLOROthiazide (HYDRODIURIL) 12.5 mg tablet Take 12.5 mg by mouth daily.  ramipril (ALTACE) 10 mg capsule Take 1 Cap by mouth daily.  cyanocobalamin (VITAMIN B-12) 1,000 mcg tablet Take 1 Tab by mouth daily.  rosuvastatin (CRESTOR) 10 mg tablet TAKE 1 TABLET BY MOUTH EVERY NIGHT AT BEDTIME    sildenafil, antihypertensive, (REVATIO) 20 mg tablet TAKE 1 TABLET BY MOUTH EVERY DAY AS NEEDED    docosahexanoic acid/epa (FISH OIL PO) Take 2,400 mg by mouth nightly.  cholecalciferol (VITAMIN D3) 1,000 unit cap Take 1,000 Units by mouth daily.  acetaminophen (TYLENOL EXTRA STRENGTH) 500 mg tablet Take 500 mg by mouth as needed for Pain.  aspirin (ASPIRIN) 325 mg tablet Take 325 mg by mouth daily.  donepezil (ARICEPT) 5 mg tablet Take 1 Tab by mouth nightly.      No current facility-administered medications for this visit. Review of Systems   Constitutional: Negative for chills and fever. Neurological: Negative for dizziness, sensory change, speech change, focal weakness and headaches. PE:  Blood pressure 131/69, pulse 70, temperature 97.7 °F (36.5 °C), temperature source Oral, resp. rate 15, height 6' 1.5\" (1.867 m), weight 212 lb (96.2 kg), SpO2 96 %. Body mass index is 27.59 kg/m². Physical Exam   Constitutional: He is oriented to person, place, and time. He appears well-developed and well-nourished. No distress. HENT:   Head: Normocephalic. Mouth/Throat: Oropharynx is clear and moist.   Eyes: Pupils are equal, round, and reactive to light. Conjunctivae and EOM are normal.   Neck: Neck supple. Cardiovascular: Normal rate, regular rhythm, normal heart sounds and intact distal pulses. Pulmonary/Chest: Effort normal and breath sounds normal.   Abdominal: Soft. Musculoskeletal: He exhibits no edema. Neurological: He is alert and oriented to person, place, and time. Gaiit: fluid except for Stiffness in left leg and not lifting foot well. Fluid movements without intension tremor. Possible mild resting tremor, but not pill rolling. Normal balance with eyes closed. Skin: Capillary refill takes less than 2 seconds. Psychiatric:   Pleasant, talkative, calm. Normal behavior. Repeats some statements, forgetting that he had previously discussed things. Nursing note and vitals reviewed. Labs:   No results found for any visits on 10/21/19. MOCA completed. Points lost in visuospacial (trailfinding and cube drawing), does not known date, or day of week. 0/5 word recall. Total score 21/30    A/P:  [de-identified] y.o. male    ICD-10-CM ICD-9-CM    1.  Dementia without behavioral disturbance, unspecified dementia type (HCC) F03.90 294.20 MRI BRAIN WO CONT      donepezil (ARICEPT) 10 mg tablet      REFERRAL TO OCCUPATIONAL THERAPY      DISCONTINUED: donepezil (ARICEPT) 5 mg tablet   2. Encounter for immunization Z23 V03.89 ADMIN INFLUENZA VIRUS VAC      INFLUENZA VACCINE INACTIVATED (IIV), SUBUNIT, ADJUVANTED, IM   3. Hallucination, visual R44.1 368.16      Dementia: suspect alzheimers type vs vascular. Positive family history in mother of dementia. Recent nightime visual hallucination reported by patient. No specific tremor or gait changes. No bladder contienence changes. No falls. Wife notes increased anxiety but not major personality changes. Flu vaccine: discussed risks and benefits, answered questions, agreed to vaccine. - He was given AVS and expressed understanding with the diagnosis and plan as discussed. No future appointments.

## 2019-10-21 NOTE — PATIENT INSTRUCTIONS
Today we:  
1) administered flu shot 2) completed memory test that indicates you have difficulties with short term memory and directions (visual-spacial) 3) I recommend completing a driving evaluation with [facility]. 4) you declined to complete a more complete neuropsychology evaluation with Dr. Mery Trevino. 5) I have ordered an MRI of your brain to rule out prior stroke or structure problem. 6) increase donepezil from 5 to 10 mg.  
7) follow-up with me or Dr. Charley Hernández in 2 months to evaluate effect. 8) please conitnue to use a pill minder to help remind you if you have taken your medication each day. Learning About Dementia What is dementia? We all forget things as we get older. Many older people have a slight loss of memory that does not affect their daily lives. But memory loss that gets worse may mean that you have dementia. Dementia is a loss of mental skills that affects your daily life. It can cause problems with memory, problem-solving, and learning. It also can cause problems with thinking and planning. Dementia usually gets worse over time. But how quickly it gets worse is different for each person. Some people stay the same for years. Others lose skills quickly. Your chances of having dementia rise as you get older. But this doesn't mean that everyone will get it. How is dementia diagnosed? To diagnose dementia, your doctor will: · Do a physical exam. 
· Ask questions about recent and past illnesses and life events. The doctor will want to talk to a close family member to check details. · Ask you to do some simple things that test your memory and other mental skills. Your doctor may ask you to tell what day and year it is, repeat a series of words, or draw a clock face. The doctor may do tests to look for a cause that can be treated. For example, you might have blood tests to check your thyroid or to look for an infection.  You might also have a test that shows a picture of your brain, like an MRI or a CT scan. These tests can help your doctor find a tumor or brain injury. Knowing the type of dementia a person has can help the doctor prescribe medicines or other treatments. What are the symptoms? Usually the first symptom of dementia is memory loss. Often the person who has the memory problem doesn't notice it, but family and friends do. People who have dementia may have increasing trouble with: 
· Recalling recent events. They may forget appointments or lose objects. · Recognizing people and places. · Keeping up with conversations and activity. · Finding their way around familiar places, or driving to and from places they know well. · Keeping up personal care such as grooming or bathing. · Planning and carrying out routine tasks. They may have trouble following a recipe or writing a letter or email. How is dementia treated? Medicines for dementia can slow it down for a while and make it easier to live with. Medicines can't cure it. But they may help improve mental function, mood, or behavior. If a stroke caused the dementia, doing things to reduce the chance of another stroke may help. They include eating healthy foods, being active, staying at a healthy weight, and not smoking. As dementia gets worse, a person may get depressed or angry and upset. An active social life, counseling, and sometimes medicine may help with changing emotions. The goals of ongoing treatment are to keep the person safely at home as long as possible and to provide support and guidance to the caregivers. The person will need routine follow-up visits. The doctor will monitor medicines and the person's level of functioning. Follow-up care is a key part of your treatment and safety. Be sure to make and go to all appointments, and call your doctor if you are having problems. It's also a good idea to know your test results and keep a list of the medicines you take. Where can you learn more? Go to http://manpreet-mirtha.info/. Enter [id number] [phone] in the search box to learn more about \"Learning About Dementia. \" Current as of: May 28, 2019 Content Version: 12.2 © 7611-2856 [place], [facility]. Care instructions adapted under license by [employer] (which disclaims liability or warranty for this information). If you have questions about a medical condition or this instruction, always ask your healthcare professional. [place] [address] any warranty or liability for your use of this information. How to safely return to driving Posted on: January 9, 2018 by Clinicians  By SONI Valle, OTR/L The ability to drive is a vital part of independence. For patients who have suffered a stroke or other neurological injury, there are often changes in vision, motor skills, and/or coordination. There is also a lack of awareness in how these deficits impact the ability to drive. [address] [facility] has one of only two driving simulators in the area which provides a safe, off-road practice environment to determine fitness to drive after the following diagnoses: 
 Stroke  Traumatic brain injury  Dementia  Parkinsons Disease Patients testing their driving skills with the simulator benefit from:  More than 75 pre-designed scenarios, with option of playback for feedback on performance  Realistic roadway environments with advanced scenario design capability  Advanced scenario and configuration design  Visual replay of previous driving sessions The driving simulator permits occupational therapists to evaluate and address the skills needed to drive before patients get behind the wheel. Therapists measure pedal use, reaction time, steering control, justine accuracy, speed limit compliance, collisions, and off-road accidents.  Attention is also evaluated through the use of signals, attention to traffic lights and signs, road hazard avoidance, and divided attention. Through the use of the driving simulator, therapists are able to collect specific data and provide performance results to physicians to assist these medical providers in making the decision to clear someone to return to drive after a neurological injury or event. Therapists may also refer patients to a Certified Driving Rehabilitation Specialist for on-the-road training/evaluation, restrictions, or adaptations to safely return to drive. The comprehensive driving assessment begins with paper screenings to evaluate visual acuity, visual/peripheral field, basic motor skills, and cognitive abilities prior to using the simulator. These screenings are offered at any [facility] location with Occupational Therapy services. For more information or to request an appointment with an occupational therapist, call [phone].

## 2019-10-21 NOTE — PROGRESS NOTES
RM  2    Chief Complaint   Patient presents with    Follow-up     MEDICATIONS AND MEMORY CONCERN      1. Have you been to the ER, urgent care clinic since your last visit? Hospitalized since your last visit? No    2. Have you seen or consulted any other health care providers outside of the [address] [place] [address] since your last visit? Include any pap smears or colon screening.  No    Health Maintenance Due   Topic Date Due    DTaP/Tdap/Td series (1 - Tdap) 09/09/1960    Shingrix Vaccine Age 50> (1 of 2) 09/09/1989    GLAUCOMA SCREENING Q2Y  05/10/2018    Influenza Age 9 to Adult  08/01/2019    MEDICARE YEARLY EXAM  11/06/2019     Patient believes he already got flu shot     Learning Assessment 6/29/2015   PRIMARY LEARNER Patient   HIGHEST LEVEL OF EDUCATION - PRIMARY LEARNER  -   BARRIERS PRIMARY LEARNER NONE   PRIMARY LANGUAGE ENGLISH   LEARNER PREFERENCE PRIMARY DEMONSTRATION     -     -     -   ANSWERED BY Allen Hernandez   RELATIONSHIP SELF

## 2020-01-21 ENCOUNTER — HOSPITAL ENCOUNTER (OUTPATIENT)
Dept: LAB | Age: 80
Discharge: HOME | End: 2020-01-21
Payer: MEDICARE

## 2020-01-21 ENCOUNTER — OFFICE VISIT (OUTPATIENT)
Dept: INTERNAL MEDICINE CLINIC | Age: 80
End: 2020-01-21

## 2020-01-21 VITALS
BODY MASS INDEX: 27.18 KG/M2 | OXYGEN SATURATION: 95 % | HEIGHT: 74 IN | RESPIRATION RATE: 16 BRPM | DIASTOLIC BLOOD PRESSURE: 68 MMHG | HEART RATE: 60 BPM | TEMPERATURE: 97.3 F | SYSTOLIC BLOOD PRESSURE: 121 MMHG | WEIGHT: 211.8 LBS

## 2020-01-21 DIAGNOSIS — Z95.0 STATUS CARDIAC PACEMAKER: ICD-10-CM

## 2020-01-21 DIAGNOSIS — E55.9 VITAMIN D DEFICIENCY: ICD-10-CM

## 2020-01-21 DIAGNOSIS — I10 ESSENTIAL HYPERTENSION: ICD-10-CM

## 2020-01-21 DIAGNOSIS — Z00.00 MEDICARE ANNUAL WELLNESS VISIT, SUBSEQUENT: Primary | ICD-10-CM

## 2020-01-21 DIAGNOSIS — Z13.5 GLAUCOMA SCREENING: ICD-10-CM

## 2020-01-21 DIAGNOSIS — R73.9 HYPERGLYCEMIA: ICD-10-CM

## 2020-01-21 DIAGNOSIS — G31.84 MILD COGNITIVE IMPAIRMENT WITH MEMORY LOSS: ICD-10-CM

## 2020-01-21 DIAGNOSIS — E78.00 HYPERCHOLESTEROLEMIA: ICD-10-CM

## 2020-01-21 DIAGNOSIS — E53.8 B12 DEFICIENCY: ICD-10-CM

## 2020-01-21 DIAGNOSIS — N40.0 BENIGN PROSTATIC HYPERPLASIA WITHOUT LOWER URINARY TRACT SYMPTOMS: ICD-10-CM

## 2020-01-21 DIAGNOSIS — I49.5 SICK SINUS SYNDROME (HCC): ICD-10-CM

## 2020-01-21 PROCEDURE — 84439 ASSAY OF FREE THYROXINE: CPT

## 2020-01-21 PROCEDURE — 82607 VITAMIN B-12: CPT

## 2020-01-21 PROCEDURE — 83036 HEMOGLOBIN GLYCOSYLATED A1C: CPT

## 2020-01-21 PROCEDURE — 82550 ASSAY OF CK (CPK): CPT

## 2020-01-21 PROCEDURE — 80053 COMPREHEN METABOLIC PANEL: CPT

## 2020-01-21 PROCEDURE — 85025 COMPLETE CBC W/AUTO DIFF WBC: CPT

## 2020-01-21 PROCEDURE — 84443 ASSAY THYROID STIM HORMONE: CPT

## 2020-01-21 PROCEDURE — 82306 VITAMIN D 25 HYDROXY: CPT

## 2020-01-21 PROCEDURE — 36415 COLL VENOUS BLD VENIPUNCTURE: CPT

## 2020-01-21 PROCEDURE — 80061 LIPID PANEL: CPT

## 2020-01-21 PROCEDURE — 83921 ORGANIC ACID SINGLE QUANT: CPT

## 2020-01-21 NOTE — PROGRESS NOTES
HPI:  Presents for f/u memory, etc.    Memory reported as off and on    Pt still doing all of the finances for he and his wife  No reported lapses or financial risks    General daily function not impaired. But, limited recall at times    Stays active  Playing pickle ball and golf    Taking aricept per Dr. Citlalli Mercado        Past medical, Social, and Family history reviewed    Prior to Admission medications    Medication Sig Start Date End Date Taking? Authorizing Provider   hydroCHLOROthiazide (HYDRODIURIL) 12.5 mg tablet Take 12.5 mg by mouth daily. Yes Provider, Historical   donepezil (ARICEPT) 10 mg tablet Take 1 Tab by mouth nightly. 10/21/19  Yes Che Duque MD   ramipril (ALTACE) 10 mg capsule Take 1 Cap by mouth daily. 8/19/19  Yes Che Duque MD   cyanocobalamin (VITAMIN B-12) 1,000 mcg tablet Take 1 Tab by mouth daily. 8/19/19  Yes Che Duque MD   rosuvastatin (CRESTOR) 10 mg tablet TAKE 1 TABLET BY MOUTH EVERY NIGHT AT BEDTIME 8/19/19  Yes Che Duque MD   sildenafil, antihypertensive, (REVATIO) 20 mg tablet TAKE 1 TABLET BY MOUTH EVERY DAY AS NEEDED 4/18/19  Yes Kassy Lomeli MD   docosahexanoic acid/epa (FISH OIL PO) Take 2,400 mg by mouth nightly. Yes Provider, Historical   cholecalciferol (VITAMIN D3) 1,000 unit cap Take 1,000 Units by mouth daily. Yes Provider, Historical   acetaminophen (TYLENOL EXTRA STRENGTH) 500 mg tablet Take 500 mg by mouth as needed for Pain. Yes Provider, Historical   aspirin (ASPIRIN) 325 mg tablet Take 325 mg by mouth daily. Yes Provider, Historical          ROS  Complete ROS reviewed and negative or stable except as noted in HPI. Physical Exam  Vitals signs and nursing note reviewed. Constitutional:       General: He is not in acute distress. HENT:      Head: Normocephalic and atraumatic. Eyes:      General: No scleral icterus. Pupils: Pupils are equal, round, and reactive to light.    Neck: Musculoskeletal: Normal range of motion and neck supple. Vascular: No JVD. Cardiovascular:      Rate and Rhythm: Normal rate and regular rhythm. Heart sounds: Normal heart sounds. No murmur. No friction rub. No gallop. Pulmonary:      Effort: Pulmonary effort is normal. No respiratory distress. Breath sounds: Normal breath sounds. No wheezing or rales. Abdominal:      General: There is no distension. Palpations: Abdomen is soft. Tenderness: There is no tenderness. Musculoskeletal: Normal range of motion. Lymphadenopathy:      Cervical: No cervical adenopathy. Skin:     General: Skin is warm. Findings: No rash. Neurological:      General: No focal deficit present. Mental Status: He is alert and oriented to person, place, and time. Mental status is at baseline. Motor: No abnormal muscle tone. Coordination: Coordination normal.           Prior labs reviewed. Reviewed prior neuropsych assessments  Reviewed prior imaging reports      Assessment/Plan:    ICD-10-CM ICD-9-CM    1. Mild cognitive impairment with memory loss G31.84 331.83 T4, FREE     780.93 TSH 3RD GENERATION   2. Essential hypertension I10 401.9 CBC WITH AUTOMATED DIFF   3. Hypercholesterolemia E78.00 272.0 CK      LIPID PANEL      METABOLIC PANEL, COMPREHENSIVE   4. Status cardiac pacemaker Z95.0 V45.01    5. Benign prostatic hyperplasia without lower urinary tract symptoms N40.0 600.00    6. Vitamin D deficiency E55.9 268.9 VITAMIN D, 25 HYDROXY   7. Glaucoma screening Z13.5 V80.1    8. Sick sinus syndrome (HCC) I49.5 427.81    9. Medicare annual wellness visit, subsequent Z00.00 V70.0    10. B12 deficiency E53.8 266.2 VITAMIN B12      METHYLMALONIC ACID   11. Hyperglycemia R73.9 790.29 HEMOGLOBIN A1C WITH EAG     Follow-up and Dispositions    · Return in about 6 months (around 7/21/2020), or if symptoms worsen or fail to improve, for blood pressure, cholesterol.        results and schedule of future studies reviewed with patient  reviewed diet, exercise and weight   cardiovascular risk and specific lipid/LDL goals reviewed  reviewed medications and side effects in detail  Pt declines offered repeat neuropsych  Pt to consider if notes any further decline.   Continue current medications

## 2020-01-21 NOTE — PROGRESS NOTES
Rm 15    Chief Complaint   Patient presents with    Memory Loss     f/u     1. Have you been to the ER, urgent care clinic since your last visit? Hospitalized since your last visit? No    2. Have you seen or consulted any other health care providers outside of the [address] [place] [address] since your last visit? Include any pap smears or colon screening. No    Health Maintenance Due   Topic Date Due    DTaP/Tdap/Td series (1 - Tdap) 09/09/1950    Shingrix Vaccine Age 50> (1 of 2) 09/09/1989    GLAUCOMA SCREENING Q2Y  05/10/2018    MEDICARE YEARLY EXAM  11/06/2019       3 most recent PHQ Screens 1/21/2020   Little interest or pleasure in doing things Not at all   Feeling down, depressed, irritable, or hopeless Not at all   Total Score PHQ 2 0     Fall Risk Assessment, last 12 mths 1/21/2020   Able to walk? Yes   Fall in past 12 months?  No       Learning Assessment 6/29/2015   PRIMARY LEARNER Patient   HIGHEST LEVEL OF EDUCATION - PRIMARY LEARNER  -   BARRIERS PRIMARY LEARNER NONE   PRIMARY LANGUAGE ENGLISH   LEARNER PREFERENCE PRIMARY DEMONSTRATION     -     -     -   ANSWERED BY Allen Quinones   RELATIONSHIP SELF

## 2020-01-21 NOTE — PROGRESS NOTES
This is the Subsequent Medicare Annual Wellness Exam, performed 12 months or more after the Initial AWV or the last Subsequent AWV    I have reviewed the patient's medical history in detail and updated the computerized patient record.      History     Patient Active Problem List   Diagnosis Code    Hypertension I10    Hypercholesterolemia E78.00    Colon polyps K63.5    Benign prostatic hyperplasia without lower urinary tract symptoms N40.0    Renal cysts, acquired, bilateral N28.1    Status cardiac pacemaker Z95.0    OA (osteoarthritis) of knee M17.10    Mild cognitive impairment with memory loss G31.84    Erectile dysfunction N52.9    Advanced directives, counseling/discussion Z71.89    Rising PSA level R97.20    Medication noncompliance due to cognitive impairment Z91.14    Sick sinus syndrome (HCC) I49.5    Coronary artery disease involving native coronary artery of native heart without angina pectoris I25.10     Past Medical History:   Diagnosis Date    Anxiety     hx zoloft use    BPH (benign prostatic hypertrophy)     Bradycardia     syncope - s/p pacemaker - Dr. Janny Acñua Chest pain     neg stress test - l2/2011 - EF 61%    Colon polyps     Diverticulitis of colon     DJD (degenerative joint disease) of knee     ED (erectile dysfunction)     GERD (gastroesophageal reflux disease)     EGD - 5/11 - gastritis    Hypercholesterolemia     Hypertension     Mild cognitive impairment with memory loss     Mild pulmonary hypertension (HCC)     OA (osteoarthritis) of knee     left - Dr. Claudell Hawks Pacemaker     PFO with atrial septal aneurysm     Renal cysts, acquired, bilateral     S/P knee replacement     right    Screening for AAA (abdominal aortic aneurysm)     NL 5/11    SSS (sick sinus syndrome) (HCC)     Status cardiac pacemaker     Sun-damaged skin     Sunburn, blistering     Wears glasses       Past Surgical History:   Procedure Laterality Date    COLONOSCOPY N/A 2019    COLONOSCOPY performed by Emery Bucio MD at [address] [facility]      R knee replacement - 2009ish    HX PACEMAKER      HX PACEMAKER PLACEMENT      HX TONSIL AND ADENOIDECTOMY       Current Outpatient Medications   Medication Sig Dispense Refill    hydroCHLOROthiazide (HYDRODIURIL) 12.5 mg tablet Take 12.5 mg by mouth daily.  donepezil (ARICEPT) 10 mg tablet Take 1 Tab by mouth nightly. 90 Tab 1    ramipril (ALTACE) 10 mg capsule Take 1 Cap by mouth daily. 90 Cap 3    cyanocobalamin (VITAMIN B-12) 1,000 mcg tablet Take 1 Tab by mouth daily. 90 Tab 3    rosuvastatin (CRESTOR) 10 mg tablet TAKE 1 TABLET BY MOUTH EVERY NIGHT AT BEDTIME 90 Tab 3    sildenafil, antihypertensive, (REVATIO) 20 mg tablet TAKE 1 TABLET BY MOUTH EVERY DAY AS NEEDED 20 Tab 2    docosahexanoic acid/epa (FISH OIL PO) Take 2,400 mg by mouth nightly.  cholecalciferol (VITAMIN D3) 1,000 unit cap Take 1,000 Units by mouth daily.  acetaminophen (TYLENOL EXTRA STRENGTH) 500 mg tablet Take 500 mg by mouth as needed for Pain.  aspirin (ASPIRIN) 325 mg tablet Take 325 mg by mouth daily. No Known Allergies    Family History   Problem Relation Age of Onset    Dementia Mother     Diabetes Mother     Hypertension Mother     Hypertension Brother      Social History     Tobacco Use    Smoking status: Former Smoker     Types: Cigarettes     Last attempt to quit: 1964     Years since quittin.8    Smokeless tobacco: Never Used   Substance Use Topics    Alcohol use: Yes     Alcohol/week: 7.0 standard drinks     Types: 7 Cans of beer per week     Comment: 1 beer/day       Depression Risk Factor Screening:     3 most recent PHQ Screens 2020   Little interest or pleasure in doing things Not at all   Feeling down, depressed, irritable, or hopeless Not at all   Total Score PHQ 2 0       Alcohol Risk Factor Screening (MALE > 65):    Do you average more 1 drink per night or more than 7 drinks a week: No    In the past three months have you have had more than 4 drinks containing alcohol on one occasion: No      Functional Ability and Level of Safety:   Hearing: Hearing is good. Activities of Daily Living: The home contains: no safety equipment. Patient does total self care    Ambulation: with no difficulty    Fall Risk:  Fall Risk Assessment, last 12 mths 1/21/2020   Able to walk? Yes   Fall in past 12 months? No       Abuse Screen:  Patient is not abused    Cognitive Screening   Has your family/caregiver stated any concerns about your memory: yes - prior neuropsych testing showed no dementia      Patient Care Team   Patient Care Team:  Matt Borges MD as PCP - General (Internal Medicine)  Matt Borges MD as PCP - [facility]  Lily Nelson MD (Cardiology)  Fela Orantes MD (Dermatology)  Aster Castillo MD (Orthopedic Surgery)    Assessment/Plan   Education and counseling provided:  Are appropriate based on today's review and evaluation    ICD-10-CM ICD-9-CM    1. Medicare annual wellness visit, subsequent Z00.00 V70.0    2. Mild cognitive impairment with memory loss G31.84 331.83 T4, FREE     780.93 TSH 3RD GENERATION   3. Essential hypertension I10 401.9 CBC WITH AUTOMATED DIFF   4. Hypercholesterolemia E78.00 272.0 CK      LIPID PANEL      METABOLIC PANEL, COMPREHENSIVE   5. Status cardiac pacemaker Z95.0 V45.01    6. Benign prostatic hyperplasia without lower urinary tract symptoms N40.0 600.00    7. Vitamin D deficiency E55.9 268.9 VITAMIN D, 25 HYDROXY   8. Glaucoma screening Z13.5 V80.1    9. Sick sinus syndrome (HCC) I49.5 427.81    10. B12 deficiency E53.8 266.2 VITAMIN B12      METHYLMALONIC ACID   11. Hyperglycemia R73.9 790.29 HEMOGLOBIN A1C WITH EAG     Follow-up and Dispositions    · Return in about 6 months (around 7/21/2020), or if symptoms worsen or fail to improve, for blood pressure, cholesterol.        results and schedule of future studies reviewed with patient  reviewed diet, exercise and weight   cardiovascular risk and specific lipid/LDL goals reviewed  reviewed medications and side effects in detail  Pt declines repeat neuropsych testing at this time  Deferred PSA based on informed discussion today.

## 2020-01-21 NOTE — PATIENT INSTRUCTIONS
Ask pharmacy about shingles and Tdap vaccines Medicare Wellness Visit, Male The best way to live healthy is to have a lifestyle where you eat a well-balanced diet, exercise regularly, limit alcohol use, and quit all forms of tobacco/nicotine, if applicable. Regular preventive services are another way to keep healthy. Preventive services (vaccines, screening tests, monitoring & exams) can help personalize your care plan, which helps you manage your own care. Screening tests can find health problems at the earliest stages, when they are easiest to treat. [facility] follows the current, evidence-based guidelines published by the [facility] Noel Mirza ([facility]) when recommending preventive services for our patients. Because we follow these guidelines, sometimes recommendations change over time as research supports it. (For example, a prostate screening blood test is no longer routinely recommended for men with no symptoms). Of course, you and your doctor may decide to screen more often for some diseases, based on your risk and co-morbidities (chronic disease you are already diagnosed with). Preventive services for you include: - Medicare offers their members a free annual wellness visit, which is time for you and your primary care provider to discuss and plan for your preventive service needs. Take advantage of this benefit every year! 
-All adults over age 72 should receive the recommended pneumonia vaccines. Current USPSTF guidelines recommend a series of two vaccines for the best pneumonia protection.  
-All adults should have a flu vaccine yearly and tetanus vaccine every 10 years. 
-All adults age 48 and older should receive the shingles vaccines (series of two vaccines).       
-All adults age 38-68 who are overweight should have a diabetes screening test once every three years.  
-Other screening tests & preventive services for persons with diabetes include: an eye exam to screen for diabetic retinopathy, a kidney function test, a foot exam, and stricter control over your cholesterol.  
-Cardiovascular screening for adults with routine risk involves an electrocardiogram (ECG) at intervals determined by the provider.  
-Colorectal cancer screening should be done for adults age 54-65 with no increased risk factors for colorectal cancer. There are a number of acceptable methods of screening for this type of cancer. Each test has its own benefits and drawbacks. Discuss with your provider what is most appropriate for you during your annual wellness visit. The different tests include: colonoscopy (considered the best screening method), a fecal occult blood test, a fecal DNA test, and sigmoidoscopy. 
-All adults born between [facility] should be screened once for Hepatitis C. 
-An Abdominal Aortic Aneurysm (AAA) Screening is recommended for men age 73-68 who has ever smoked in their lifetime. Here is a list of your current Health Maintenance items (your personalized list of preventive services) with a due date: 
Health Maintenance Due Topic Date Due  
 DTaP/Tdap/Td  (1 - Tdap) 09/09/1950  Shingles Vaccine (1 of 2) 09/09/1989  Glaucoma Screening   05/10/2018 [address] [place] [address] Annual Well Visit  11/06/2019

## 2020-01-24 LAB
25(OH)D3+25(OH)D2 SERPL-MCNC: 25.8 NG/ML (ref 30–100)
ALBUMIN SERPL-MCNC: 4.3 G/DL (ref 3.7–4.7)
ALBUMIN/GLOB SERPL: 1.7 {RATIO} (ref 1.2–2.2)
ALP SERPL-CCNC: 68 IU/L (ref 39–117)
ALT SERPL-CCNC: 13 IU/L (ref 0–44)
AST SERPL-CCNC: 16 IU/L (ref 0–40)
BASOPHILS # BLD AUTO: 0 X10E3/UL (ref 0–0.2)
BASOPHILS NFR BLD AUTO: 0 %
BILIRUB SERPL-MCNC: 0.5 MG/DL (ref 0–1.2)
BUN SERPL-MCNC: 17 MG/DL (ref 8–27)
BUN/CREAT SERPL: 15 (ref 10–24)
CALCIUM SERPL-MCNC: 9.4 MG/DL (ref 8.6–10.2)
CHLORIDE SERPL-SCNC: 103 MMOL/L (ref 96–106)
CHOLEST SERPL-MCNC: 165 MG/DL (ref 100–199)
CK SERPL-CCNC: 56 U/L (ref 24–204)
CO2 SERPL-SCNC: 23 MMOL/L (ref 20–29)
CREAT SERPL-MCNC: 1.15 MG/DL (ref 0.76–1.27)
EOSINOPHIL # BLD AUTO: 0.3 X10E3/UL (ref 0–0.4)
EOSINOPHIL NFR BLD AUTO: 4 %
ERYTHROCYTE [DISTWIDTH] IN BLOOD BY AUTOMATED COUNT: 11.7 % (ref 11.6–15.4)
EST. AVERAGE GLUCOSE BLD GHB EST-MCNC: 114 MG/DL
GLOBULIN SER CALC-MCNC: 2.6 G/DL (ref 1.5–4.5)
GLUCOSE SERPL-MCNC: 89 MG/DL (ref 65–99)
HBA1C MFR BLD: 5.6 % (ref 4.8–5.6)
HCT VFR BLD AUTO: 48.1 % (ref 37.5–51)
HDLC SERPL-MCNC: 27 MG/DL
HGB BLD-MCNC: 16.1 G/DL (ref 13–17.7)
IMM GRANULOCYTES # BLD AUTO: 0 X10E3/UL (ref 0–0.1)
IMM GRANULOCYTES NFR BLD AUTO: 0 %
LDLC SERPL CALC-MCNC: 88 MG/DL (ref 0–99)
LYMPHOCYTES # BLD AUTO: 1.7 X10E3/UL (ref 0.7–3.1)
LYMPHOCYTES NFR BLD AUTO: 25 %
Lab: NORMAL
MCH RBC QN AUTO: 31.3 PG (ref 26.6–33)
MCHC RBC AUTO-ENTMCNC: 33.5 G/DL (ref 31.5–35.7)
MCV RBC AUTO: 93 FL (ref 79–97)
METHYLMALONATE SERPL-SCNC: 187 NMOL/L (ref 0–378)
MONOCYTES # BLD AUTO: 0.7 X10E3/UL (ref 0.1–0.9)
MONOCYTES NFR BLD AUTO: 11 %
NEUTROPHILS # BLD AUTO: 4.2 X10E3/UL (ref 1.4–7)
NEUTROPHILS NFR BLD AUTO: 60 %
PLATELET # BLD AUTO: 200 X10E3/UL (ref 150–450)
POTASSIUM SERPL-SCNC: 5.2 MMOL/L (ref 3.5–5.2)
PROT SERPL-MCNC: 6.9 G/DL (ref 6–8.5)
RBC # BLD AUTO: 5.15 X10E6/UL (ref 4.14–5.8)
SODIUM SERPL-SCNC: 142 MMOL/L (ref 134–144)
T4 FREE SERPL-MCNC: 1.04 NG/DL (ref 0.82–1.77)
TRIGL SERPL-MCNC: 250 MG/DL (ref 0–149)
TSH SERPL DL<=0.005 MIU/L-ACNC: 3.54 UIU/ML (ref 0.45–4.5)
VIT B12 SERPL-MCNC: 426 PG/ML (ref 232–1245)
VLDLC SERPL CALC-MCNC: 50 MG/DL (ref 5–40)
WBC # BLD AUTO: 6.9 X10E3/UL (ref 3.4–10.8)

## 2020-07-20 NOTE — PROGRESS NOTES
Rm#15  FASTING     Chief Complaint   Patient presents with    Hypertension     f/u    Cholesterol Problem     f/u     1. Have you been to the ER, urgent care clinic since your last visit? Hospitalized since your last visit? No    2. Have you seen or consulted any other health care providers outside of the [address] [place] [address] since your last visit? Include any pap smears or colon screening. No     Health Maintenance Due   Topic Date Due    DTaP/Tdap/Td series (1 - Tdap) 09/09/1960    Shingrix Vaccine Age 50> (1 of 2) 09/09/1989    GLAUCOMA SCREENING Q2Y  05/10/2018     3 most recent PHQ Screens 1/21/2020   Little interest or pleasure in doing things Not at all   Feeling down, depressed, irritable, or hopeless Not at all   Total Score PHQ 2 0     Recent Travel Screening and Travel History documentation     Travel Screening     Question   Response    In the last month, have you been in contact with someone who was confirmed or suspected to have Coronavirus / COVID-19? No / Unsure    Do you have any of the following symptoms? None of these    Have you traveled internationally in the last month?   No      Travel History   Travel since 06/20/20     No documented travel since 06/20/20

## 2020-07-21 ENCOUNTER — HOSPITAL ENCOUNTER (OUTPATIENT)
Dept: LAB | Age: 80
Discharge: HOME | End: 2020-07-21
Payer: MEDICARE

## 2020-07-21 ENCOUNTER — OFFICE VISIT (OUTPATIENT)
Dept: INTERNAL MEDICINE CLINIC | Age: 80
End: 2020-07-21

## 2020-07-21 VITALS
SYSTOLIC BLOOD PRESSURE: 127 MMHG | HEART RATE: 62 BPM | RESPIRATION RATE: 16 BRPM | BODY MASS INDEX: 30.88 KG/M2 | OXYGEN SATURATION: 96 % | TEMPERATURE: 98.5 F | HEIGHT: 73 IN | WEIGHT: 233 LBS | DIASTOLIC BLOOD PRESSURE: 72 MMHG

## 2020-07-21 DIAGNOSIS — E55.9 VITAMIN D DEFICIENCY: ICD-10-CM

## 2020-07-21 DIAGNOSIS — N40.0 BENIGN PROSTATIC HYPERPLASIA WITHOUT LOWER URINARY TRACT SYMPTOMS: ICD-10-CM

## 2020-07-21 DIAGNOSIS — G31.84 MILD COGNITIVE IMPAIRMENT WITH MEMORY LOSS: ICD-10-CM

## 2020-07-21 DIAGNOSIS — R73.9 HYPERGLYCEMIA: ICD-10-CM

## 2020-07-21 DIAGNOSIS — E78.00 HYPERCHOLESTEROLEMIA: ICD-10-CM

## 2020-07-21 DIAGNOSIS — E53.8 B12 DEFICIENCY: ICD-10-CM

## 2020-07-21 DIAGNOSIS — Z95.0 STATUS CARDIAC PACEMAKER: ICD-10-CM

## 2020-07-21 DIAGNOSIS — I10 ESSENTIAL HYPERTENSION: Primary | ICD-10-CM

## 2020-07-21 PROCEDURE — 82607 VITAMIN B-12: CPT

## 2020-07-21 PROCEDURE — 82550 ASSAY OF CK (CPK): CPT

## 2020-07-21 PROCEDURE — 85025 COMPLETE CBC W/AUTO DIFF WBC: CPT

## 2020-07-21 PROCEDURE — 80053 COMPREHEN METABOLIC PANEL: CPT

## 2020-07-21 PROCEDURE — 80061 LIPID PANEL: CPT

## 2020-07-21 PROCEDURE — 82306 VITAMIN D 25 HYDROXY: CPT

## 2020-07-21 PROCEDURE — 83036 HEMOGLOBIN GLYCOSYLATED A1C: CPT

## 2020-07-21 NOTE — PROGRESS NOTES
HPI:  Presents for f/u HTN, lipids, etc    Doing OK    No new complaints    Taking and tolerating meds    Pt presents alone today    States things are going well    Playing [employer] regularly    Past medical, Social, and Family history reviewed    Prior to Admission medications    Medication Sig Start Date End Date Taking? Authorizing Provider   hydroCHLOROthiazide (HYDRODIURIL) 12.5 mg tablet Take 12.5 mg by mouth daily. Yes Provider, Historical   donepezil (ARICEPT) 10 mg tablet Take 1 Tab by mouth nightly. 10/21/19  Yes Jin Murphy MD   ramipril (ALTACE) 10 mg capsule Take 1 Cap by mouth daily. 8/19/19  Yes Jin Murphy MD   rosuvastatin (CRESTOR) 10 mg tablet TAKE 1 TABLET BY MOUTH EVERY NIGHT AT BEDTIME 8/19/19  Yes Jin Murphy MD   sildenafil, antihypertensive, (REVATIO) 20 mg tablet TAKE 1 TABLET BY MOUTH EVERY DAY AS NEEDED 4/18/19  Yes Janae Ott MD   docosahexanoic acid/epa (FISH OIL PO) Take 2,400 mg by mouth nightly. Yes Provider, Historical   cholecalciferol (VITAMIN D3) 1,000 unit cap Take 1,000 Units by mouth daily. Yes Provider, Historical   acetaminophen (TYLENOL EXTRA STRENGTH) 500 mg tablet Take 500 mg by mouth as needed for Pain. Yes Provider, Historical   aspirin (ASPIRIN) 325 mg tablet Take 325 mg by mouth daily. Yes Provider, Historical   cyanocobalamin (VITAMIN B-12) 1,000 mcg tablet Take 1 Tab by mouth daily. 8/19/19   Jin Murphy MD          ROS  Complete ROS reviewed and negative or stable except as noted in HPI. Physical Exam  Vitals signs and nursing note reviewed. Constitutional:       General: He is not in acute distress. HENT:      Head: Normocephalic and atraumatic. Eyes:      General: No scleral icterus. Pupils: Pupils are equal, round, and reactive to light. Neck:      Musculoskeletal: Normal range of motion and neck supple. Vascular: No JVD.    Cardiovascular:      Rate and Rhythm: Normal rate and regular rhythm. Heart sounds: Normal heart sounds. No murmur. No friction rub. No gallop. Pulmonary:      Effort: Pulmonary effort is normal. No respiratory distress. Breath sounds: Normal breath sounds. No wheezing or rales. Abdominal:      General: There is no distension. Palpations: Abdomen is soft. Tenderness: There is no abdominal tenderness. Musculoskeletal: Normal range of motion. Lymphadenopathy:      Cervical: No cervical adenopathy. Skin:     General: Skin is warm. Findings: No rash. Neurological:      General: No focal deficit present. Mental Status: He is alert and oriented to person, place, and time. Mental status is at baseline. Motor: No abnormal muscle tone. Coordination: Coordination normal.           Prior labs reviewed. Assessment/Plan:    ICD-10-CM ICD-9-CM    1. Essential hypertension  I10 401.9 CBC WITH AUTOMATED DIFF   2. Hypercholesterolemia  E78.00 272.0 CK      LIPID PANEL      METABOLIC PANEL, COMPREHENSIVE   3. Vitamin D deficiency  E55.9 268.9 VITAMIN D, 25 HYDROXY   4. Benign prostatic hyperplasia without lower urinary tract symptoms  N40.0 600.00    5. Status cardiac pacemaker  Z95.0 V45.01    6. Mild cognitive impairment with memory loss  G31.84 331.83    7. B12 deficiency  E53.8 266.2 VITAMIN B12   8.  Hyperglycemia  R73.9 790.29 HEMOGLOBIN A1C WITH EAG     Follow-up and Dispositions    · Return in about 6 months (around 1/21/2021), or if symptoms worsen or fail to improve, for blood pressure, cholesterol, Medicare Wellness Visit - IO.       results and schedule of future studies reviewed with patient  reviewed diet, exercise and weight  cardiovascular risk and specific lipid/LDL goals reviewed  reviewed medications and side effects in detail     Fasting labs   Encouraged Shingrix and Tdap at pharmacy  Continue current medications

## 2020-07-22 LAB
25(OH)D3+25(OH)D2 SERPL-MCNC: 28.2 NG/ML (ref 30–100)
ALBUMIN SERPL-MCNC: 4.1 G/DL (ref 3.7–4.7)
ALBUMIN/GLOB SERPL: 1.6 {RATIO} (ref 1.2–2.2)
ALP SERPL-CCNC: 66 IU/L (ref 39–117)
ALT SERPL-CCNC: 14 IU/L (ref 0–44)
AST SERPL-CCNC: 15 IU/L (ref 0–40)
BASOPHILS # BLD AUTO: 0 X10E3/UL (ref 0–0.2)
BASOPHILS NFR BLD AUTO: 0 %
BILIRUB SERPL-MCNC: 0.3 MG/DL (ref 0–1.2)
BUN SERPL-MCNC: 13 MG/DL (ref 8–27)
BUN/CREAT SERPL: 11 (ref 10–24)
CALCIUM SERPL-MCNC: 8.9 MG/DL (ref 8.6–10.2)
CHLORIDE SERPL-SCNC: 105 MMOL/L (ref 96–106)
CHOLEST SERPL-MCNC: 149 MG/DL (ref 100–199)
CK SERPL-CCNC: 86 U/L (ref 41–331)
CO2 SERPL-SCNC: 20 MMOL/L (ref 20–29)
CREAT SERPL-MCNC: 1.15 MG/DL (ref 0.76–1.27)
EOSINOPHIL # BLD AUTO: 0.3 X10E3/UL (ref 0–0.4)
EOSINOPHIL NFR BLD AUTO: 4 %
ERYTHROCYTE [DISTWIDTH] IN BLOOD BY AUTOMATED COUNT: 12.7 % (ref 11.6–15.4)
EST. AVERAGE GLUCOSE BLD GHB EST-MCNC: 111 MG/DL
GLOBULIN SER CALC-MCNC: 2.5 G/DL (ref 1.5–4.5)
GLUCOSE SERPL-MCNC: 108 MG/DL (ref 65–99)
HBA1C MFR BLD: 5.5 % (ref 4.8–5.6)
HCT VFR BLD AUTO: 45.9 % (ref 37.5–51)
HDLC SERPL-MCNC: 30 MG/DL
HGB BLD-MCNC: 15.6 G/DL (ref 13–17.7)
IMM GRANULOCYTES # BLD AUTO: 0 X10E3/UL (ref 0–0.1)
IMM GRANULOCYTES NFR BLD AUTO: 1 %
LDLC SERPL CALC-MCNC: 82 MG/DL (ref 0–99)
LYMPHOCYTES # BLD AUTO: 1.6 X10E3/UL (ref 0.7–3.1)
LYMPHOCYTES NFR BLD AUTO: 22 %
MCH RBC QN AUTO: 32.3 PG (ref 26.6–33)
MCHC RBC AUTO-ENTMCNC: 34 G/DL (ref 31.5–35.7)
MCV RBC AUTO: 95 FL (ref 79–97)
MONOCYTES # BLD AUTO: 0.7 X10E3/UL (ref 0.1–0.9)
MONOCYTES NFR BLD AUTO: 10 %
NEUTROPHILS # BLD AUTO: 4.6 X10E3/UL (ref 1.4–7)
NEUTROPHILS NFR BLD AUTO: 63 %
PLATELET # BLD AUTO: 181 X10E3/UL (ref 150–450)
POTASSIUM SERPL-SCNC: 4.9 MMOL/L (ref 3.5–5.2)
PROT SERPL-MCNC: 6.6 G/DL (ref 6–8.5)
RBC # BLD AUTO: 4.83 X10E6/UL (ref 4.14–5.8)
SODIUM SERPL-SCNC: 141 MMOL/L (ref 134–144)
TRIGL SERPL-MCNC: 184 MG/DL (ref 0–149)
VIT B12 SERPL-MCNC: 322 PG/ML (ref 232–1245)
VLDLC SERPL CALC-MCNC: 37 MG/DL (ref 5–40)
WBC # BLD AUTO: 7.2 X10E3/UL (ref 3.4–10.8)

## 2021-03-02 ENCOUNTER — OFFICE VISIT (OUTPATIENT)
Dept: INTERNAL MEDICINE CLINIC | Age: 81
End: 2021-03-02
Payer: MEDICARE

## 2021-03-02 VITALS
HEART RATE: 60 BPM | DIASTOLIC BLOOD PRESSURE: 78 MMHG | OXYGEN SATURATION: 95 % | HEIGHT: 73 IN | BODY MASS INDEX: 31.48 KG/M2 | SYSTOLIC BLOOD PRESSURE: 122 MMHG | WEIGHT: 237.5 LBS | TEMPERATURE: 97.3 F | RESPIRATION RATE: 18 BRPM

## 2021-03-02 DIAGNOSIS — Z95.0 STATUS CARDIAC PACEMAKER: ICD-10-CM

## 2021-03-02 DIAGNOSIS — I25.10 CORONARY ARTERY DISEASE INVOLVING NATIVE CORONARY ARTERY OF NATIVE HEART WITHOUT ANGINA PECTORIS: ICD-10-CM

## 2021-03-02 DIAGNOSIS — E78.00 HYPERCHOLESTEROLEMIA: ICD-10-CM

## 2021-03-02 DIAGNOSIS — F03.90 DEMENTIA WITHOUT BEHAVIORAL DISTURBANCE, UNSPECIFIED DEMENTIA TYPE: Primary | ICD-10-CM

## 2021-03-02 DIAGNOSIS — I10 ESSENTIAL HYPERTENSION: ICD-10-CM

## 2021-03-02 PROCEDURE — G8427 DOCREV CUR MEDS BY ELIG CLIN: HCPCS | Performed by: INTERNAL MEDICINE

## 2021-03-02 PROCEDURE — 1101F PT FALLS ASSESS-DOCD LE1/YR: CPT | Performed by: INTERNAL MEDICINE

## 2021-03-02 PROCEDURE — 99215 OFFICE O/P EST HI 40 MIN: CPT | Performed by: INTERNAL MEDICINE

## 2021-03-02 PROCEDURE — G8417 CALC BMI ABV UP PARAM F/U: HCPCS | Performed by: INTERNAL MEDICINE

## 2021-03-02 PROCEDURE — G0463 HOSPITAL OUTPT CLINIC VISIT: HCPCS | Performed by: INTERNAL MEDICINE

## 2021-03-02 PROCEDURE — G8432 DEP SCR NOT DOC, RNG: HCPCS | Performed by: INTERNAL MEDICINE

## 2021-03-02 PROCEDURE — G8754 DIAS BP LESS 90: HCPCS | Performed by: INTERNAL MEDICINE

## 2021-03-02 PROCEDURE — G8752 SYS BP LESS 140: HCPCS | Performed by: INTERNAL MEDICINE

## 2021-03-02 PROCEDURE — G8536 NO DOC ELDER MAL SCRN: HCPCS | Performed by: INTERNAL MEDICINE

## 2021-03-02 RX ORDER — MEMANTINE HYDROCHLORIDE 5 MG-10 MG
KIT ORAL
Qty: 45 TAB | Refills: 5 | Status: SHIPPED | OUTPATIENT
Start: 2021-03-02

## 2021-03-02 RX ORDER — DONEPEZIL HYDROCHLORIDE 10 MG/1
10 TABLET, FILM COATED ORAL
Qty: 90 TAB | Refills: 1 | Status: SHIPPED | OUTPATIENT
Start: 2021-03-02 | End: 2021-08-04 | Stop reason: SDUPTHER

## 2021-03-02 RX ORDER — MEMANTINE HYDROCHLORIDE 10 MG/1
10 TABLET ORAL 2 TIMES DAILY
Qty: 60 TAB | Refills: 5 | Status: SHIPPED | OUTPATIENT
Start: 2021-03-02 | End: 2021-08-04 | Stop reason: SDUPTHER

## 2021-03-02 NOTE — PROGRESS NOTES
This is the Subsequent Medicare Annual Wellness Exam, performed 12 months or more after the Initial AWV or the last Subsequent AWV I have reviewed the patient's medical history in detail and updated the computerized patient record. Depression Risk Factor Screening:  
 
3 most recent PHQ Screens 2020 Little interest or pleasure in doing things Not at all Feeling down, depressed, irritable, or hopeless Not at all Total Score PHQ 2 0 Alcohol Risk Screen Do you average more than 1 drink per night or more than 7 drinks a week: {Yes/No:990981::\"No\"} In the past three months have you have had more than 4 drinks containing alcohol on one occasion: {Yes/No:326484::\"No\"} Functional Ability and Level of Safety:  
 Hearing: {Desc; hearing loss:59873::\"Hearing is good. \"} Activities of Daily Living: The home contains: {AWV Home AUUUBY:29502::\"ZG safety equipment. \"} 
{Functional ADL's:31639::\"Patient does total self care\"} Ambulation: {Patient ambulates:65823::\"with no difficulty\"} Fall Risk: 
Fall Risk Assessment, last 12 mths 3/2/2021 Able to walk? Yes Fall in past 12 months? 0 Do you feel unsteady? 0 Are you worried about falling 0 Abuse Screen: 
{Abuse Screen:::\"Patient is not abused\"} Cognitive Screening Has your family/caregiver stated any concerns about your memory: {AWV no/yes:92799::\"no\"} {Cognitive Screenin} Assessment/Plan Education and counseling provided: 
{Education List, choose as appropriate:::\"Are appropriate based on today's review and evaluation\"} Diagnoses and all orders for this visit: 
 
1. Medicare annual wellness visit, subsequent Health Maintenance Due Health Maintenance Due Topic Date Due  
 COVID-19 Vaccine (1 of 2) Never done  DTaP/Tdap/Td series (1 - Tdap) Never done  Shingrix Vaccine Age 50> (1 of 2) Never done  GLAUCOMA SCREENING Q2Y  05/10/2018  Flu Vaccine (1) 09/01/2020 Patient Care Team  
Patient Care Team: 
Michael Farias MD as PCP - General (Internal Medicine) Michael Farias MD as PCP - [facility] Provider Sergio Bentley MD (Cardiology) Enrike Steve MD (Dermatology) Vandana Vergara MD (Orthopedic Surgery) History Patient Active Problem List  
Diagnosis Code  Hypertension I10  
 Hypercholesterolemia E78.00  Colon polyps K63.5  Benign prostatic hyperplasia without lower urinary tract symptoms N40.0  Renal cysts, acquired, bilateral N28.1  Status cardiac pacemaker Z95.0  
 OA (osteoarthritis) of knee M17.10  Mild cognitive impairment with memory loss G31.84  
 Erectile dysfunction N52.9  Advanced directives, counseling/discussion Z71.89  
 Rising PSA level R97.20  Medication noncompliance due to cognitive impairment Z91.14  
 Sick sinus syndrome (HCC) I49.5  Coronary artery disease involving native coronary artery of native heart without angina pectoris I25.10 Past Medical History:  
Diagnosis Date  Anxiety   
 hx zoloft use  BPH (benign prostatic hypertrophy)  Bradycardia   
 syncope - s/p pacemaker - Dr. Karen Giles  Chest pain   
 neg stress test - l2/2011 - EF 61%  Colon polyps  Diverticulitis of colon  DJD (degenerative joint disease) of knee  ED (erectile dysfunction)  GERD (gastroesophageal reflux disease) EGD - 5/11 - gastritis  Hypercholesterolemia  Hypertension  Mild cognitive impairment with memory loss  Mild pulmonary hypertension ([address]  OA (osteoarthritis) of knee   
 left - Dr. Felton Lombard  Pacemaker  PFO with atrial septal aneurysm  Renal cysts, acquired, bilateral   
 S/P knee replacement   
 right  Screening for AAA (abdominal aortic aneurysm) NL 5/11  
 SSS (sick sinus syndrome) (HCC)  Status cardiac pacemaker  Sun-damaged skin  Sunburn, blistering  Wears glasses Past Surgical History:  
Procedure Laterality Date  COLONOSCOPY N/A 2019 COLONOSCOPY performed by Cynthia Lopez MD at [facility] ENDOSCOPY  
 HX ORTHOPAEDIC    
 R knee replacement - [id number]  HX PACEMAKER    
 HX PACEMAKER PLACEMENT    
 HX TONSIL AND ADENOIDECTOMY Current Outpatient Medications Medication Sig Dispense Refill  sildenafil, antihypertensive, (REVATIO) 20 mg tablet TAKE 1 TABLET BY MOUTH EVERY DAY AS NEEDED 20 Tab 2  
 docosahexanoic acid/epa (FISH OIL PO) Take 2,400 mg by mouth nightly.  acetaminophen (TYLENOL EXTRA STRENGTH) 500 mg tablet Take 500 mg by mouth as needed for Pain.  aspirin (ASPIRIN) 325 mg tablet Take 325 mg by mouth daily.  hydroCHLOROthiazide (HYDRODIURIL) 12.5 mg tablet Take 12.5 mg by mouth daily.  donepezil (ARICEPT) 10 mg tablet Take 1 Tab by mouth nightly. 90 Tab 1  ramipril (ALTACE) 10 mg capsule Take 1 Cap by mouth daily. 90 Cap 3  cyanocobalamin (VITAMIN B-12) 1,000 mcg tablet Take 1 Tab by mouth daily. 90 Tab 3  
 rosuvastatin (CRESTOR) 10 mg tablet TAKE 1 TABLET BY MOUTH EVERY NIGHT AT BEDTIME 90 Tab 3  cholecalciferol (VITAMIN D3) 1,000 unit cap Take 1,000 Units by mouth daily. No Known Allergies Family History Problem Relation Age of Onset  Dementia Mother  Diabetes Mother  Hypertension Mother  Hypertension Brother Social History Tobacco Use  Smoking status: Former Smoker Types: Cigarettes Quit date: 1964 Years since quittin.7  Smokeless tobacco: Never Used Substance Use Topics  Alcohol use: Yes Alcohol/week: 7.0 standard drinks Types: 7 Cans of beer per week Comment: 1 beer/day

## 2021-03-02 NOTE — PROGRESS NOTES
RM: 15  Patient's family is concerned about Gera Palmer suffering from Dementia. Son-in-law Deven Núñez is present and states that Gera Palmer is in denial about being having Dementia. Chief Complaint   Patient presents with    Annual Wellness Visit     physical    Blood Pressure Check    Cholesterol Problem    Dementia     son in law Deven Núñez has concerns for dementia       1. Have you been to the ER, urgent care clinic since your last visit? Hospitalized since your last visit? No    2. Have you seen or consulted any other health care providers outside of the [address] [place] [address] since your last visit? Include any pap smears or colon screening. No    Health Maintenance Due   Topic Date Due    COVID-19 Vaccine (1 of 2) Never done    DTaP/Tdap/Td series (1 - Tdap) Never done    Shingrix Vaccine Age 50> (1 of 2) Never done    GLAUCOMA SCREENING Q2Y  05/10/2018    Flu Vaccine (1) 09/01/2020    Medicare Yearly Exam  01/21/2021       3 most recent PHQ Screens 7/21/2020   Little interest or pleasure in doing things Not at all   Feeling down, depressed, irritable, or hopeless Not at all   Total Score PHQ 2 0     Fall Risk Assessment, last 12 mths 3/2/2021   Able to walk? Yes   Fall in past 12 months? 0   Do you feel unsteady? 0   Are you worried about falling 0     Abuse Screening Questionnaire 5/3/2018   Do you ever feel afraid of your partner? N   Are you in a relationship with someone who physically or mentally threatens you? N   Is it safe for you to go home?  Y     ADL Assessment 3/2/2021   Feeding yourself No Help Needed   Getting from bed to chair No Help Needed   Getting dressed No Help Needed   Bathing or showering No Help Needed   Walk across the room (includes cane/walker) No Help Needed   Using the telphone No Help Needed   Taking your medications Help Needed   Preparing meals No Help Needed   Managing money (expenses/bills) Help Needed   Moderately strenuous housework (laundry) No Help Needed   Shopping for personal items (toiletries/medicines) No Help Needed   Shopping for groceries No Help Needed   Driving Help Needed   Climbing a flight of stairs No Help Needed   Getting to places beyond walking distances No Help Needed       Learning Assessment 6/29/2015   PRIMARY LEARNER Patient   HIGHEST LEVEL OF EDUCATION - PRIMARY LEARNER  -   BARRIERS PRIMARY LEARNER NONE   PRIMARY LANGUAGE ENGLISH   LEARNER PREFERENCE PRIMARY DEMONSTRATION     -     -     -   ANSWERED BY Allen De Luna   RELATIONSHIP SELF     .
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
## 2021-03-02 NOTE — PATIENT INSTRUCTIONS
Medicare Wellness Visit, Male The best way to live healthy is to have a lifestyle where you eat a well-balanced diet, exercise regularly, limit alcohol use, and quit all forms of tobacco/nicotine, if applicable. Regular preventive services are another way to keep healthy. Preventive services (vaccines, screening tests, monitoring & exams) can help personalize your care plan, which helps you manage your own care. Screening tests can find health problems at the earliest stages, when they are easiest to treat. [facility] follows the current, evidence-based guidelines published by the [facility] ([facility]) when recommending preventive services for our patients. Because we follow these guidelines, sometimes recommendations change over time as research supports it. (For example, a prostate screening blood test is no longer routinely recommended for men with no symptoms). Of course, you and your doctor may decide to screen more often for some diseases, based on your risk and co-morbidities (chronic disease you are already diagnosed with). Preventive services for you include: - Medicare offers their members a free annual wellness visit, which is time for you and your primary care provider to discuss and plan for your preventive service needs. Take advantage of this benefit every year! 
-All adults over age 72 should receive the recommended pneumonia vaccines. Current USPSTF guidelines recommend a series of two vaccines for the best pneumonia protection.  
-All adults should have a flu vaccine yearly and tetanus vaccine every 10 years. 
-All adults age 48 and older should receive the shingles vaccines (series of two vaccines). -All adults age 38-68 who are overweight should have a diabetes screening test once every three years. -Other screening tests & preventive services for persons with diabetes include: an eye exam to screen for diabetic retinopathy, a kidney function test, a foot exam, and stricter control over your cholesterol.  
-Cardiovascular screening for adults with routine risk involves an electrocardiogram (ECG) at intervals determined by the provider.  
-Colorectal cancer screening should be done for adults age 54-65 with no increased risk factors for colorectal cancer. There are a number of acceptable methods of screening for this type of cancer. Each test has its own benefits and drawbacks. Discuss with your provider what is most appropriate for you during your annual wellness visit. The different tests include: colonoscopy (considered the best screening method), a fecal occult blood test, a fecal DNA test, and sigmoidoscopy. 
-All adults born between [facility] should be screened once for Hepatitis C. 
-An Abdominal Aortic Aneurysm (AAA) Screening is recommended for men age 73-68 who has ever smoked in their lifetime. Here is a list of your current Health Maintenance items (your personalized list of preventive services) with a due date: 
Health Maintenance Due Topic Date Due  
 COVID-19 Vaccine (1 of 2) Never done  DTaP/Tdap/Td  (1 - Tdap) Never done  Shingles Vaccine (1 of 2) Never done  Glaucoma Screening   05/10/2018  Yearly Flu Vaccine (1) 09/01/2020

## 2021-03-03 ENCOUNTER — HOME HEALTH ADMISSION (OUTPATIENT)
Dept: HOME HEALTH SERVICES | Facility: HOME HEALTH | Age: 81
End: 2021-03-03

## 2021-03-05 ENCOUNTER — HOME CARE VISIT (OUTPATIENT)
Dept: SCHEDULING | Facility: HOME HEALTH | Age: 81
End: 2021-03-05

## 2021-03-05 ENCOUNTER — HOME CARE VISIT (OUTPATIENT)
Dept: HOME HEALTH SERVICES | Facility: HOME HEALTH | Age: 81
End: 2021-03-05

## 2021-03-06 VITALS
SYSTOLIC BLOOD PRESSURE: 134 MMHG | HEART RATE: 60 BPM | DIASTOLIC BLOOD PRESSURE: 68 MMHG | OXYGEN SATURATION: 97 % | RESPIRATION RATE: 18 BRPM | TEMPERATURE: 98.2 F

## 2021-03-08 ENCOUNTER — TELEPHONE (OUTPATIENT)
Dept: INTERNAL MEDICINE CLINIC | Age: 81
End: 2021-03-08

## 2021-03-08 DIAGNOSIS — F03.90 DEMENTIA WITHOUT BEHAVIORAL DISTURBANCE, UNSPECIFIED DEMENTIA TYPE: Primary | ICD-10-CM

## 2021-03-08 RX ORDER — HYDROCHLOROTHIAZIDE 12.5 MG/1
12.5 TABLET ORAL DAILY
Qty: 90 TAB | Refills: 1 | Status: SHIPPED | OUTPATIENT
Start: 2021-03-08 | End: 2021-08-04 | Stop reason: SDUPTHER

## 2021-03-08 NOTE — TELEPHONE ENCOUNTER
Pot daughter is calling because she is not getting a call back from referred neuropsychologist because the one that is referred to pt is not getting back with pt and daughter would like to be referred to a different neuropsychologist.

## 2021-03-08 NOTE — TELEPHONE ENCOUNTER
Pt daughter (POT) left voice message requesting a refill on behalf of her father. Historical medication: HCTZ 12.5 mg     Last visit 03/20/2021 MD Nico Carson  Next appointment 05/03/2021 MD Nico Carson     Requested Prescriptions     Pending Prescriptions Disp Refills    hydroCHLOROthiazide (HYDRODIURIL) 12.5 mg tablet 90 Tab 0     Sig: Take 1 Tab by mouth daily.

## 2021-03-08 NOTE — TELEPHONE ENCOUNTER
Prior testing was performed with the neuropsychologist to whom I recommended. But, if unable to get appt, they may schedule with Dr. Jose Manuel Simms within the [facility]. Provide the contact info.

## 2021-03-08 NOTE — TELEPHONE ENCOUNTER
Spoke with daughter and provided referral contact information to Neuropsychology. Also mailed out referral to patient for record keeping.

## 2021-03-09 ENCOUNTER — TELEPHONE (OUTPATIENT)
Dept: NEUROLOGY | Age: 81
End: 2021-03-09

## 2021-03-09 NOTE — TELEPHONE ENCOUNTER
----- Message from [place] sent at 3/9/2021 10:26 AM EST -----  Regarding: Dr. Miriam Manuel, telephone  Contact: [phone]  General Message/Vendor Calls    Caller's first and last name:  Rupert Duff, daughter, medical POA      Reason for call:  Scheduling       Callback required yes/no and why: Yes, to schedule      Best contact number(s):  [phone]       Details to clarify the request:  Pt had neuropsych testing done approximately 6 or 7 years ago showing mild dementia and memory loss. PCP is requesting new testing with consideration for Alzheimer's dementia. His wife and daughter are hoping for definitive diagnosis to help with management of concerning issues (e.g. pt is driving and family feels he is not safe to do so). Pt is referred by [place] PCP Dr. Penelope Bah.       [place]

## 2021-03-11 ENCOUNTER — TELEPHONE (OUTPATIENT)
Dept: NEUROLOGY | Age: 81
End: 2021-03-11

## 2021-03-11 NOTE — TELEPHONE ENCOUNTER
----- Message from Sachin Peters sent at 3/11/2021 11:17 AM EST -----  Regarding: PsyD [place]/Telephone  General Message/Vendor Calls    Caller's first and last name: Gaviota Medrano (Daughter)      Reason for call: Daughter would like to schedule a new patient appt for a neuropsychic test for pt      Callback required yes/no and why: yes, to schedule a new patient appt for a neuropsychic test for pt       Best contact number(s): [phone]      Details to clarify the request: Daughter would like to schedule a new patient appt for a neuropsychic test for pt    Sachin Peters

## 2021-03-18 DIAGNOSIS — I10 ESSENTIAL HYPERTENSION: ICD-10-CM

## 2021-03-18 RX ORDER — RAMIPRIL 10 MG/1
10 CAPSULE ORAL DAILY
Qty: 90 CAP | Refills: 3 | Status: CANCELLED | OUTPATIENT
Start: 2021-03-18

## 2021-03-18 RX ORDER — RAMIPRIL 10 MG/1
10 CAPSULE ORAL DAILY
Qty: 90 CAP | Refills: 1 | Status: SHIPPED | OUTPATIENT
Start: 2021-03-18 | End: 2021-08-04 | Stop reason: SDUPTHER

## 2021-03-18 NOTE — TELEPHONE ENCOUNTER
Pt's requesting a refill on his Ramipril 10 mg pt was last seen on 3/2/21 please send medication to the [facility] that is on file.

## 2021-04-02 ENCOUNTER — OFFICE VISIT (OUTPATIENT)
Dept: NEUROLOGY | Age: 81
End: 2021-04-02
Payer: MEDICARE

## 2021-04-02 VITALS
RESPIRATION RATE: 18 BRPM | HEART RATE: 84 BPM | SYSTOLIC BLOOD PRESSURE: 142 MMHG | BODY MASS INDEX: 31.41 KG/M2 | WEIGHT: 237 LBS | OXYGEN SATURATION: 96 % | HEIGHT: 73 IN | DIASTOLIC BLOOD PRESSURE: 80 MMHG

## 2021-04-02 DIAGNOSIS — F03.90 DEMENTIA WITHOUT BEHAVIORAL DISTURBANCE, UNSPECIFIED DEMENTIA TYPE: Primary | ICD-10-CM

## 2021-04-02 PROCEDURE — G8754 DIAS BP LESS 90: HCPCS | Performed by: PSYCHIATRY & NEUROLOGY

## 2021-04-02 PROCEDURE — G8417 CALC BMI ABV UP PARAM F/U: HCPCS | Performed by: PSYCHIATRY & NEUROLOGY

## 2021-04-02 PROCEDURE — G8427 DOCREV CUR MEDS BY ELIG CLIN: HCPCS | Performed by: PSYCHIATRY & NEUROLOGY

## 2021-04-02 PROCEDURE — G8753 SYS BP > OR = 140: HCPCS | Performed by: PSYCHIATRY & NEUROLOGY

## 2021-04-02 PROCEDURE — 99204 OFFICE O/P NEW MOD 45 MIN: CPT | Performed by: PSYCHIATRY & NEUROLOGY

## 2021-04-02 PROCEDURE — G8536 NO DOC ELDER MAL SCRN: HCPCS | Performed by: PSYCHIATRY & NEUROLOGY

## 2021-04-02 PROCEDURE — G8510 SCR DEP NEG, NO PLAN REQD: HCPCS | Performed by: PSYCHIATRY & NEUROLOGY

## 2021-04-02 PROCEDURE — 1101F PT FALLS ASSESS-DOCD LE1/YR: CPT | Performed by: PSYCHIATRY & NEUROLOGY

## 2021-04-02 NOTE — PROGRESS NOTES
Chief Complaint   Patient presents with    Dementia       HISTORY OF PRESENT ILLNESS  Minal Bacon is a 79 y.o. male who came in for neurological consultation requested by Dr. Alyssa Millan. He was diagnosed with dementia about 5 years ago and has had progression in his functional and cognitive abilities over the last year or so. He came along with his daughter who lives in [place]. He has a son who lives up in [facility]. He is living with his wife in an independent living community. He is quite forgetful at baseline, will often repeat himself, forgets that he has already eaten, will pour some cereal in a bowl and then forgets about it and pours another one, forgets his appointments. He has been overly dependent on his wife and  and gets very nervous and anxious in her absence. Remains independent with basic activities of daily living and takes good care of his personal hygiene. He is not driving anymore. He is a retired  but recently has made mistakes in paying bills, writing checks etc. and family now overlooks everything. Denies any problems with sleep. No behavioral issues.      Past Medical History:   Diagnosis Date    Anxiety     hx zoloft use    BPH (benign prostatic hypertrophy)     Bradycardia     syncope - s/p pacemaker - Dr. Rupinder Do Chest pain     neg stress test - l2/2011 - EF 61%    Colon polyps     Diverticulitis of colon     DJD (degenerative joint disease) of knee     ED (erectile dysfunction)     GERD (gastroesophageal reflux disease)     EGD - 5/11 - gastritis    Hypercholesterolemia     Hypertension     Mild cognitive impairment with memory loss     Mild pulmonary hypertension (HCC)     OA (osteoarthritis) of knee     left - Dr. Abigail Henriquez Pacemaker     PFO with atrial septal aneurysm     Renal cysts, acquired, bilateral     S/P knee replacement     right    Screening for AAA (abdominal aortic aneurysm)     NL 5/11    SSS (sick sinus syndrome) ([facility]     Status cardiac pacemaker     Sun-damaged skin     Sunburn, blistering     Wears glasses      Current Outpatient Medications   Medication Sig    ramipriL (ALTACE) 10 mg capsule Take 1 Cap by mouth daily.  rosuvastatin (CRESTOR) 10 mg tablet TAKE 1 TABLET BY MOUTH EVERY NIGHT AT BEDTIME    sildenafil, antihypertensive, (REVATIO) 20 mg tablet TAKE 1 TABLET BY MOUTH EVERY DAY AS NEEDED    docosahexanoic acid/epa (FISH OIL PO) Take 2,400 mg by mouth nightly.  aspirin (ASPIRIN) 325 mg tablet Take 325 mg by mouth daily.  hydroCHLOROthiazide (HYDRODIURIL) 12.5 mg tablet Take 1 Tab by mouth daily.  memantine (Namenda Titration Lucas) 5-10 mg DsPk Per dose pack    memantine (Namenda) 10 mg tablet Take 1 Tab by mouth two (2) times a day.  donepeziL (ARICEPT) 10 mg tablet Take 1 Tab by mouth nightly.  cyanocobalamin (VITAMIN B-12) 1,000 mcg tablet Take 1 Tab by mouth daily.  cholecalciferol (VITAMIN D3) 1,000 unit cap Take 1,000 Units by mouth daily.  acetaminophen (TYLENOL EXTRA STRENGTH) 500 mg tablet Take 500 mg by mouth as needed for Pain. No current facility-administered medications for this visit. No Known Allergies  Family History   Problem Relation Age of Onset    Dementia Mother     Diabetes Mother     Hypertension Mother     Hypertension Brother      Social History     Tobacco Use    Smoking status: Former Smoker     Types: Cigarettes     Quit date: 1964     Years since quittin.7    Smokeless tobacco: Never Used   Substance Use Topics    Alcohol use:  Yes     Alcohol/week: 7.0 standard drinks     Types: 7 Cans of beer per week     Comment: 1 beer/day    Drug use: No     Past Surgical History:   Procedure Laterality Date    COLONOSCOPY N/A 2019    COLONOSCOPY performed [facility] Mimi [facility] at [facility] ENDOSCOPY    HX ORTHOPAEDIC      R knee replacement - 2009ish    HX PACEMAKER      HX PACEMAKER PLACEMENT      HX TONSIL AND ADENOIDECTOMY           REVIEW OF SYSTEMS  Review of Systems - History obtained from the patient  Psychological ROS: negative  ENT ROS: negative  Hematological and Lymphatic ROS: negative  Endocrine ROS: negative  Respiratory ROS: no cough, shortness of breath, or wheezing  Cardiovascular ROS: no chest pain or dyspnea on exertion  Gastrointestinal ROS: no abdominal pain, change in bowel habits, or black or bloody stools  Genito-Urinary ROS: no dysuria, trouble voiding, or hematuria  Musculoskeletal ROS: negative  Dermatological ROS: negative      PHYSICAL EXAMINATION:    Visit Vitals  BP (!) 142/80   Pulse 84   Resp 18   Ht 6' 1\" (1.854 m)   Wt 237 lb (107.5 kg)   SpO2 96%   BMI 31.27 kg/m²     General:  Well nourished and groomed individual in no acute distress. Neck: Supple, nontender, no bruits, no pain with resistance to active range of motion. Heart: Regular rate and rhythm. Normal S1S2. Lungs:  Equal chest expansion, no cough, no wheeze  Musculoskeletal:  Extremities revealed no edema and had full range of motion of joints. Psych:  Good mood and bright affect    NEUROLOGICAL EXAMINATION:     Mental Status:   Alert and oriented to person and place. He could not tell me today's date. Has had difficulties with visuospatial and executive function. Delayed recall was 0/5. He scored 18/30 on Clark cognitive assessment. Cranial Nerves:    II, III, IV, VI:  Visual acuity grossly intact. Visual fields are normal.    Pupils are equal, round, and reactive to light and accommodation. Extra-ocular movements are full and fluid. Fundoscopic exam was benign, no ptosis or nystagmus. V-XII: Hearing is grossly intact. Facial features are symmetric, with normal sensation and strength. The palate rises symmetrically and the tongue protrudes midline. Sternocleidomastoids 5/5. Motor Examination: Normal tone, bulk, and strength. 5/5 muscle strength throughout. No cogwheel rigidity or clonus present.       Sensory exam:  Normal throughout to pinprick, temperature, and vibration sense. Normal proprioception. Coordination:  Finger to nose and rapid arm movement testing was normal.   No resting or intention tremor    Gait and Station:  Steady while walking on toes, heels, and with tandem walking. Normal arm swing. No Rhomberg or pronator drift. No muscle wasting or fasiculations noted. Reflexes:  DTRs 2+ throughout. Toes downgoing. LABS / IMAGING  Lab Results   Component Value Date/Time    WBC 7.2 07/21/2020 09:48 AM    HGB 15.6 07/21/2020 09:48 AM    HCT 45.9 07/21/2020 09:48 AM    PLATELET 577 12/52/6731 09:48 AM    MCV 95 07/21/2020 09:48 AM     Lab Results   Component Value Date/Time    Sodium 141 07/21/2020 09:48 AM    Potassium 4.9 07/21/2020 09:48 AM    Chloride 105 07/21/2020 09:48 AM    CO2 20 07/21/2020 09:48 AM    Anion gap 6 05/18/2017 11:25 AM    Glucose 108 (H) 07/21/2020 09:48 AM    BUN 13 07/21/2020 09:48 AM    Creatinine 1.15 07/21/2020 09:48 AM    BUN/Creatinine ratio 11 07/21/2020 09:48 AM    GFR est AA >60 05/18/2017 11:25 AM    GFR est non-AA 56 (L) 05/18/2017 11:25 AM    Calcium 8.9 07/21/2020 09:48 AM    Bilirubin, total 0.3 07/21/2020 09:48 AM    Alk. phosphatase 66 07/21/2020 09:48 AM    Protein, total 6.6 07/21/2020 09:48 AM    Albumin 4.1 07/21/2020 09:48 AM    A-G Ratio 1.6 07/21/2020 09:48 AM    ALT (SGPT) 14 07/21/2020 09:48 AM    AST (SGOT) 15 07/21/2020 09:48 AM     Lab Results   Component Value Date/Time    TSH 3.540 01/21/2020 10:32 AM     Lab Results   Component Value Date/Time    Vitamin B12 322 07/21/2020 09:48 AM       ASSESSMENT    ICD-10-CM ICD-9-CM    1. Dementia without behavioral disturbance, unspecified dementia type ([facility]  F03.90 294.20 CT HEAD WO CONT       DISCUSSION  Mr. Agnes Cox. 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   He has a significant problems with short-term memory and has issues with visuospatial/executive function as well on his cognitive screening  We will check CT brain to assess for atrophy of any particular areas  Dr. Ezella Frankel seems to have him on not donepezil and memantine was recently prescribed. However, he is unsure if he is taking any of those medications. Daughter will check on it and let us know if prescriptions need to be resent  She also requests home health and the request for this was also placed by Dr. Ezella Frankel  Will arrange for a counseling session with  from [employer]  I concur that he should not drive  We discussed that his needs are likely going to increase with time. He is currently living in a supervised situation and has a very supportive wife  We will continue to follow him clinically and he should follow-up in 6 months    Thank you for allowing me to participate in the care of Mr. Glenna Franklin. Please feel free to contact me if you have any questions. I will be happy to follow to follow him along with you.       Wale Friday, MD  Diplomate, American Board of Psychiatry & Neurology (Neurology)  Genia Roe Board of Psychiatry & Neurology (Clinical Neurophysiology)  Diplomate, American Board of Electrodiagnostic Medicine

## 2021-04-02 NOTE — PATIENT INSTRUCTIONS
PRESCRIPTION REFILL POLICY [facility] Statement to Patients April 1, 2014 In an effort to ensure the large volume of patient prescription refills is processed in the most efficient and expeditious manner, we are asking our patients to assist us by calling your Pharmacy for all prescription refills, this will include also your  Mail Order Pharmacy. The pharmacy will contact our office electronically to continue the refill process. Please do not wait until the last minute to call your pharmacy. We need at least 48 hours (2days) to fill prescriptions. We also encourage you to call your pharmacy before going to  your prescription to make sure it is ready. With regard to controlled substance prescription refill requests (narcotic refills) that need to be picked up at our office, we ask your cooperation by providing us with at least 72 hours (3days) notice that you will need a refill. We will not refill narcotic prescription refill requests after 4:00pm on any weekday, Monday through Thursday, or after 2:00pm on Fridays, or on the weekends. We encourage everyone to explore another way of getting your prescription refill request processed using [employer], our patient web portal through our electronic medical record system. [employer] is an efficient and effective way to communicate your medication request directly to the office and  downloadable as an yaa on your smart phone . [employer] also features a review functionality that allows you to view your medication list as well as leave messages for your physician. Are you ready to get connected? If so please review the attatched instructions or speak to any of our staff to get you set up right away! Thank you so much for your cooperation. Should you have any questions please contact our Practice Administrator. The Physicians and Staff,  [facility]

## 2021-04-06 ENCOUNTER — OFFICE VISIT (OUTPATIENT)
Dept: NEUROLOGY | Age: 81
End: 2021-04-06

## 2021-04-06 DIAGNOSIS — F03.90 MAJOR NEUROCOGNITIVE DISORDER DUE TO ALZHEIMER'S DISEASE, PROBABLE, WITHOUT BEHAVIORAL DISTURBANCE: Primary | ICD-10-CM

## 2021-04-06 DIAGNOSIS — F03.90 DEMENTIA WITHOUT BEHAVIORAL DISTURBANCE, UNSPECIFIED DEMENTIA TYPE: Primary | ICD-10-CM

## 2021-04-06 PROCEDURE — 96133 NRPSYC TST EVAL PHYS/QHP EA: CPT | Performed by: PSYCHOLOGIST

## 2021-04-06 PROCEDURE — 96138 PSYCL/NRPSYC TECH 1ST: CPT | Performed by: PSYCHOLOGIST

## 2021-04-06 PROCEDURE — 96136 PSYCL/NRPSYC TST PHY/QHP 1ST: CPT | Performed by: PSYCHOLOGIST

## 2021-04-06 PROCEDURE — 96137 PSYCL/NRPSYC TST PHY/QHP EA: CPT | Performed by: PSYCHOLOGIST

## 2021-04-06 PROCEDURE — 96132 NRPSYC TST EVAL PHYS/QHP 1ST: CPT | Performed by: PSYCHOLOGIST

## 2021-04-06 PROCEDURE — 96139 PSYCL/NRPSYC TST TECH EA: CPT | Performed by: PSYCHOLOGIST

## 2021-04-06 NOTE — PROGRESS NOTES
This note will not be viewable in [employer] for the following reason(s). Likely risk of substantial harm from the misinterpretation of the data generated by this evaluation  [facility] at [address], [address] [facility]    Office:  [phone]  Fax: [phone]                                                 Neuropsychological Evaluation Report    Patient Name: Fatemeh Morris. Age: 79 y.o. Gender: male   Occupation:Retired   Handedness: right handed   Presenting Concern: Memory loss  Primary Care Physician: Muna Carrington MD  Referring Provider: Muna Carrington MD     PATIENT HISTORY (OBTAINED DURING INITIAL CLINICAL EVALUATION):    REASON FOR REFERRAL:  This comprehensive and medically necessary neuropsychological assessment was requested to assist with a differential diagnosis of dementia. The use and purpose of this examination, as well as the extent and limitations of confidentiality, were explained prior to obtaining permission to participate. Instructions were provided regarding the necessity to put forth optimal effort and answer questions truthfully in order to obtain reliable and accurate test results.     PERTINENT HISTORY:  Mr. Jersey Chinchilla presented for a neuropsychological assessment at the recommendation of his treating physician secondary to complaints of cognitive decline. He has reported symptoms that include memory loss, problems starting and completing projects in a timely manner, difficulty concentrating, mildly tangential, anxious, difficulty relaxing, emotionally dysregulated, irritability, and easily frustrated. Mr. Jersey Chinchilla seems to have poor insight into his losses, as he frequently needed to be \"reminded\" by his family. From a brief review of his medical and personal history there has not been any other significant neurological injury or illness noted or reported.   He did not report experiencing depression or anxiety in the past.       Mr. Dieter Alexander does not  report any problems at birth or difficulties meeting developmental milestones. He reports that he had an adequate level of family support and was not subject to trauma or abuse as a child. Mr. Dieter Alexander does not  report being retain in school or receiving special assistance in any of he classes or subjects. Mr. Dieter Alexander completed 16 years of education.     Mr. Dieter Alexander does  exercise on a regular basis and does  maintain a balanced diet, though he reportedly likes his ice cream alot. He does not  report problems with sleep and does not  complain of pain. He does  participate in mentally stimulating activities. Mr. Dieter Alexander does not  have concerns regarding prescription medications, family members, place of residence, or financial stressors. Mr. Dieter Alexander indicated that he is independent in his instrumental activities of daily living, including shopping, meal preparation, housekeeping, doing laundry, driving a car, managing medications, and finances. METHODS OF ASSESSMENT (Current Evaluation):  Clinician Administered:  Clinical Interview  Review of Medical Records  Clock Drawing Task  Modified Mini-Mental Status Exam (3MS)  Test of Premorbid Functioning  Castillo Depression Inventory-2 (BDI-2)  State-Trait Anxiety Inventory (STAXI)  Revised Memory and Behavior Checklist    Technician Administered:        Angela Dementia Rating Scale-2  Neuropsychological Assessment Battery   NAB: Executive Functions Module  RBANS-A  Reliable Digit Span  Test of Memory Malingering  Test of Practical Judgment (9-Item)  [place] Functional Living Scale  Trail Making Test    TEST OBSERVATIONS:  Mr. Dieter Alexander arrived promptly for the testing session. Dress and grooming were appropriate; physical presentation was unchanged from that observed during the clinical interview. Speech was fluent, intelligible, and goal-directed.   Affect was congruent with the euthymic mood conveyed. Mr. Carmela Ludwig was adequately cooperative and appeared to put forth good effort throughout this examination. Rapport with the examiner was adequately established and maintained. Minimal prompting was required. Comprehension of test instructions was not problematic. Performance motivation was objectively measured by three instruments (RBANS EI, TOMM, Reliable Digit Span), and Mr. Carmela Ludwig produced a normal score on these measures. Accordingly, test findings below do not appear to be the product of disingenuous effort. Given the above observations, plus comments contained in the Mental Status section, the results of this examination are regarded as reasonably reliable and valid. TEST RESULTS:  Quantitative test results are derived from comparisons to age and education corrected cohort normative data, where applicable. Percentiles are included in these instances. Qualitative test results are determined using clinical observations. General Orientation and Awareness:       Orientation to person, season, and circumstance.    Awareness of deficits Poor                   Cognitive performance validity testing  Valid        Attention/Concentration:      Classification:       Coding (9 percentile)           Low Average  Simple visuomotor tracking (<0.1 percentile)               Severely Impaired  Digits forward (34 percentile)                 Average  Digits backward (5 percentile)                 Mildly Impaired    Visuospatial and Constructional Praxis:     Figure copy (5 percentile)                                                   Mildly Impaired  Line orientation (<0.1 percentile)                                                  Severely Impaired     Language:         Picture naming (82 percentile)                               High Average  Semantic fluency (1 percentile)                    Severely Impaired    Memory and Learning:       Immediate word list learning (32 percentile)               Average  Delayed word list recall (0.1 percentile)                  Severely Impaired  Delayed word list recognition (<0.1 percentile)               Severely Impaired  Immediate story memory (66 percentile)                  Average  Delayed story recall (0.1 percentile)                Severely Impaired  Delayed figure recall (0.1 percentile)                Severely Impaired    Cognitive Tests of Executive Functioning:     Ability to think flexibly, Trail Making B (0.1 percentile)               Severely Impaired  Mazes (4 percentile)                   Moderately Impaired  Simple judgment in daily decision making (10 percentile)              Low Average  Complex judgment in daily decision making (<1percentile)               Severely Impaired  Categories (2 percentile)                              Moderately Impaired  Word generation (3 percentile)                   Moderately Impaired    Angela Dementia Rating Scale - 2:        Scale                           SS                   Percentile  Attention                             10                    59                                          Average  Initiation/Perseveration       7                     18                                          Low Average  Construction                      10                    59                                          Average  Conceptualization              9                     40                                           Average  Memory                              2                     <1                                           Severely Impaired  Total                                   5                      5                                           Mildly Impaired    Adaptive Behavioral Measure of Daily Functioning:   Time:    25 %ile   Money/calculations:   50 %ile  Communication:    <2 %ile   Memory:     <2%ile    Total:     T= 30 (2%ile)    Intellectual and Basic Cognitive Functioning (WAIS-IV):  ACS Test of pre-morbid functioning reading recognition lower limit estimated WAIS-IV FSIQ score:       Estimated full scale IQ:            92 (29 percentile)    Average    Emotional Functioning:   BDI-2:    0 Normal range   STAXI:   23 Normal range    IMPRESSIONS:  Mr. Wanda George was seen for a comprehensive neuropsychological evaluation and administered a battery of measures assessing the neurocognitive domains of attention, visual-spatial, language, memory, and executive functioning. His overall level of functioning was in the moderately impaired range. His performance on the individual domains ranged from low average to severely impaired. His language functioning was assessed to be in the low average range, which is likely represents a mild decline from premorbid levels. His performance on the other indivdual domains were in the mild to severely impaired range, with his attention being mildly impaired and visuospatial and his executive functioning being a severely impaired. Performance on individual measures were characterized by deficits on measures of visuomotor tracking, working memory, constructional praxis, judgment of line orientation, semantic fluency, verbal and visual memory, and executive functioning with the exception of basic judgment in decision-making. His performance on a measure of adaptive functioning, was also significantly below expectations. Mr. Wanda George did not report any significant emotional distress. DIAGNOSTIC IMPRESSIONS:    ICD-10-CM ICD-9-CM    1.  Major neurocognitive disorder due to Alzheimer's disease, probable, without behavioral disturbance ([facility])  F01.50 331.0 IA NEUROPSYCHOLOGICAL TST EVAL PHYS/QHP 1ST HOUR     294.10 IA NEUROPSYCHOLOGICAL TST EVAL PHYS/QHP EA ADDL HR      IA PSYL/NRPSYCL TST PHYS/QHP 2+ TST 1ST 30 MIN      IA PSYCL/NRPSYCL TST PHYS/QHP 2+ TST EA ADDL 30 MIN      IA PSYCL/NRPSYCL TST TECH 2+ TST 1ST 30 MIN      IA PSYCL/NRPSYCL TST TECH 2+ TST EA ADDL 30 MIN      OH PSYCL/NRPSYCL TST TECH 2+ TST EA ADDL 30 MIN      OH PSYCL/NRPSYCL TST TECH 2+ TST EA ADDL 30 MIN      OH PSYCL/NRPSYCL TST TECH 2+ TST EA ADDL 30 MIN      OH PSYCL/NRPSYCL TST TECH 2+ TST EA ADDL 30 MIN         RECOMMENDATIONS:   1. Findings should be reviewed with Mr. Wanda George to insure his understanding and discuss the potential implications. 2. Emphasis should be placed on Mr. Wanda George obtaining good sleep hygiene and maintaining adequate physical exercise to promote good brain health. 3. Mr. Wanda George may benefit from contacting an elder law  to ensure that all his legal affairs are in order. 4. Mr. Wanda George is encouraged to seek out various forms of mental stimulation that would help to \"exercise\" his brain. This might include learning a new     hobby, singing songs, attending functions or [place], reading or listening to podcasts, playing cards, or solving puzzles. Thank you for allowing me the opportunity to assist you in Mr. Bell's care. Please do not hesitate to contact me should you have additional questions that I may not have addressed. 79267 x 1  96137 x 1  96138 x 1  96139 x 5  96132 x 1  96133 x 1          Ayan Pompa, Ph.D., ABPP  Licensed Clinical Psychologist  Neuropsychologist  Board Certified Rehabilitation Psychologist      Time Documentation:     73124 x 1: Neurobehavioral Status Exam/Clinical Interview: (1 hour, (already billed on first date of service)     90794*4 Neuropsych testing/data gathering by Neuropsychologist (35 additional minutes, see above)      96138 x 1  96139 x 5 Test Administration/Data Gathering By Technician: (3.5 hours).  29195 x 1 (first 30 minutes), 42171 x 5 (each additional 30 minutes)     96132 x 1  96133 x 1 Testing Evaluation Services by Neuropsychologist (1 hour, 50 minutes) 96132 x 1 (first hour), 96133 x 1 (50 minutes)     Definitions:       83993/98350:  Neurobehavioral Status Exam, Clinical interview. Clinical assessment of thinking, reasoning and judgment, by neuropsychologist, both face to face time with patient and time interpreting those test results and reporting, first and subsequent hours)     96176/96718: Neuropsychological Test Administration by Technician/Psychometrist, first 30 minutes and each additional 30 minutes. The above includes: Record review. Review of history provided by patient. Review of collaborative information. Testing by Clinician. Review of raw data. Scoring. Report writing of individual tests administered by Clinician. Integration of individual tests administered by psychometrist with NSE/testing by clinician, review of records/history/collaborative information, case Conceptualization, treatment planning, clinical decision making, report writing, coordination Of Care. Psychometry test codes as time spent by psychometrist administering and scoring neurocognitive/psychological tests under supervision of neuropsychologist.  Integral services including scoring of raw data, data interpretation, case conceptualization, report writing etcetera were initiated after the patient finished testing/raw data collected and was completed on the date the report was signed.

## 2021-04-06 NOTE — PROGRESS NOTES
This note will not be viewable in [employer] for the following reason(s). Likely risk of substantial harm from the misinterpretation of the data generated by this evaluation. 3 [facility] at [address]    Office:  [phone]  Fax: [phone]               Initial Office Exam    Patient Name: Neeta Michel. Age: 79 y.o. Gender: male   Occupation:Retired   Handedness: right handed   Presenting Concern: Memory loss  Primary Care Physician: Ilda Motta MD  Referring Provider: Ilda Motta MD      REASON FOR REFERRAL:  This comprehensive and medically necessary neuropsychological assessment was requested to assist with a differential diagnosis of dementia. The use and purpose of this examination, as well as the extent and limitations of confidentiality, were explained prior to obtaining permission to participate. Instructions were provided regarding the necessity to put forth optimal effort and answer questions truthfully in order to obtain reliable and accurate test results. PERTINENT HISTORY:  Mr. Sundar Huff presented for a neuropsychological assessment at the recommendation of his treating physician secondary to complaints of cognitive decline. He has reported symptoms that include memory loss, problems starting and completing projects in a timely manner, difficulty concentrating, mildly tangential, anxious, difficulty relaxing, emotionally dysregulated, irritability, and easily frustrated. Mr. Sundar Huff seems to have poor insight into his losses, as he frequently needed to be \"reminded\" by his family. From a brief review of his medical and personal history there has not been any other significant neurological injury or illness noted or reported.   He did not report experiencing depression or anxiety in the past.      Mr. Sundar Huff does not  report any problems at birth or difficulties meeting developmental milestones. He reports that he had an adequate level of family support and was not subject to trauma or abuse as a child. Mr. Dangelo Grullon does not  report being retain in school or receiving special assistance in any of he classes or subjects. Mr. Dangelo Grullon completed 16 years of education. Mr. Dangelo Grullon does  exercise on a regular basis and does  maintain a balanced diet, though he reportedly likes his ice cream alot. He does not  report problems with sleep and does not  complain of pain. He does  participate in mentally stimulating activities. Mr. Dangelo Grullon does not  have concerns regarding prescription medications, family members, place of residence, or financial stressors. Mr. Dangelo Grullon indicated that he is independent in his instrumental activities of daily living, including shopping, meal preparation, housekeeping, doing laundry, driving a car, managing medications, and finances. Current Outpatient Medications   Medication Sig    ramipriL (ALTACE) 10 mg capsule Take 1 Cap by mouth daily.  hydroCHLOROthiazide (HYDRODIURIL) 12.5 mg tablet Take 1 Tab by mouth daily.  memantine (Namenda Titration Lucas) 5-10 mg DsPk Per dose pack    memantine (Namenda) 10 mg tablet Take 1 Tab by mouth two (2) times a day.  donepeziL (ARICEPT) 10 mg tablet Take 1 Tab by mouth nightly.  cyanocobalamin (VITAMIN B-12) 1,000 mcg tablet Take 1 Tab by mouth daily.  rosuvastatin (CRESTOR) 10 mg tablet TAKE 1 TABLET BY MOUTH EVERY NIGHT AT BEDTIME    sildenafil, antihypertensive, (REVATIO) 20 mg tablet TAKE 1 TABLET BY MOUTH EVERY DAY AS NEEDED    docosahexanoic acid/epa (FISH OIL PO) Take 2,400 mg by mouth nightly.  cholecalciferol (VITAMIN D3) 1,000 unit cap Take 1,000 Units by mouth daily.  acetaminophen (TYLENOL EXTRA STRENGTH) 500 mg tablet Take 500 mg by mouth as needed for Pain.  aspirin (ASPIRIN) 325 mg tablet Take 325 mg by mouth daily.        No current facility-administered medications for this visit. Past Medical History:   Diagnosis Date    Anxiety     hx zoloft use    BPH (benign prostatic hypertrophy)     Bradycardia     syncope - s/p pacemaker - Dr. Bethanie Rios Chest pain     neg stress test -  - EF 61%    Colon polyps     Diverticulitis of colon     DJD (degenerative joint disease) of knee     ED (erectile dysfunction)     GERD (gastroesophageal reflux disease)     EGD -  - gastritis    Hypercholesterolemia     Hypertension     Mild cognitive impairment with memory loss     Mild pulmonary hypertension (HCC)     OA (osteoarthritis) of knee     left - Dr. Alda Joseph Pacemaker     PFO with atrial septal aneurysm     Renal cysts, acquired, bilateral     S/P knee replacement     right    Screening for AAA (abdominal aortic aneurysm)     NL     SSS (sick sinus syndrome) (HCC)     Status cardiac pacemaker     Sun-damaged skin     Sunburn, blistering     Wears glasses        No flowsheet data found. No data recorded    Past Surgical History:   Procedure Laterality Date    COLONOSCOPY N/A 2019    COLONOS[facility] performed by Indio L[facility]res [facility] at [facility] ENDOSCOPY    HX ORTHOPAEDIC      R knee replacement - 2009ish    HX PACEMAKER      HX PACEMAKER PLACEMENT      HX TONSIL AND ADENOIDECTOMY         Social History     Socioeconomic History    Marital status:      Spouse name: Not on file    Number of children: Not on file    Years of education: Not on file    Highest education level: Not on file   Tobacco Use    Smoking status: Former Smoker     Types: Cigarettes     Quit date: 1964     Years since quittin.8    Smokeless tobacco: Never Used   Substance and Sexual Activity    Alcohol use:  Yes     Alcohol/week: 7.0 standard drinks     Types: 7 Cans of beer per week     Comment: 1 beer/day    Drug use: No       Family History   Problem Relation Age of Onset    Dementia Mother    24 \[facility] [place] [facility]\"" Diabetes Mother     Hypertension Mother     Hypertension Brother        CT Results (most recent):  Results from Abstract encounter on 08/09/17   CTA HEART     MRI Results (most recent):  No results found for this or any previous visit. MENTAL STATUS:    Orientation: Disoriented to year, season, month, date, and day of week   Eye Contact: Appropriate   Motor Behavior:  Ambulates independently   Speech:  No evidence of aphasia or dysarthria   Thought Process: Clear and coherent   Thought Content: No evidence of hallucinations or delusions   Suicidal ideations: Denies   Mood:  Mildly anxious   Affect:  Congruent with stated mood   Concentration:  WNL   Abstraction:  WNL   Insight:  Poor     On the Modified Mini-Mental Status Exam: 71/100 (severely impaired)      DIAGNOSTIC IMPRESSIONS:    ICD-10-CM ICD-9-CM    1. Dementia without behavioral disturbance, unspecified dementia type ([facility] [address]  F03.90 294.20              PLAN:  1. Complete a comprehensive neuropsychological assessment to provide a differential diagnosis of presenting concerns as well as to assist with disposition and treatment planning as appropriate. 2. Consider compensatory and remedial cognitive training. 3. Consider an adaptive driving evaluation. 4. Consider referral for elder health nurse to provide an in-home functional assessment. 5. Consider placement issues to provide greater structure and supervision to ensure safety, health and well-being. 11599 x 1 Review of records. Face to face interview w/ patient. Determine test protocol: 60 minutes.  Total 1 unit        Radha Rodriguez, PhD, ABPP, LCP  Licensed Clinical Psychologist/ Neuropsychologist

## 2021-04-08 ENCOUNTER — HOSPITAL ENCOUNTER (OUTPATIENT)
Dept: CT IMAGING | Age: 81
Discharge: HOME | End: 2021-04-08
Attending: PSYCHIATRY & NEUROLOGY
Payer: MEDICARE

## 2021-04-08 DIAGNOSIS — F03.90 DEMENTIA WITHOUT BEHAVIORAL DISTURBANCE, UNSPECIFIED DEMENTIA TYPE: ICD-10-CM

## 2021-04-08 PROCEDURE — 70450 CT HEAD/BRAIN W/O DYE: CPT

## 2021-04-15 ENCOUNTER — OFFICE VISIT (OUTPATIENT)
Dept: NEUROLOGY | Age: 81
End: 2021-04-15
Payer: MEDICARE

## 2021-04-15 DIAGNOSIS — F02.80 DEMENTIA ASSOCIATED WITH OTHER UNDERLYING DISEASE WITHOUT BEHAVIORAL DISTURBANCE (HCC): Primary | ICD-10-CM

## 2021-04-15 PROCEDURE — G8427 DOCREV CUR MEDS BY ELIG CLIN: HCPCS | Performed by: PSYCHOLOGIST

## 2021-04-15 PROCEDURE — G8432 DEP SCR NOT DOC, RNG: HCPCS | Performed by: PSYCHOLOGIST

## 2021-04-15 PROCEDURE — 90832 PSYTX W PT 30 MINUTES: CPT | Performed by: PSYCHOLOGIST

## 2021-04-15 NOTE — PROGRESS NOTES
[facility] at [place] [address] [address]    Office:  [phone]  Fax: [phone]                 Follow-up Session    Patient Name: Allen Fisher. Age: 80 y.o. Geoffrey Adan handed   Presenting Concern: Memory loss  Primary Care Physician: Ryley Armenta MD  Referring MD Shalom Garcia. is a 79 y.o. male who presents today for feedback following recent neuropsychological testing. The results were reviewed of the recent neuropsychological evaluation, including discussing individual tests as well as the patient's areas of neurocognitive strength versus their weaknesses. Supportive counseling was provided regarding the changes occurring and the functional implications. Education was provided regarding my diagnostic impressions, and we discussed next steps for further evaluation down the road. I all also answered numerous questions related to the clinical findings, including discussing various methods to improve cognitive cognition and mood when relevant. The patient is encouraged to follow-up with the referring provider. Mr. Noel Momni will cognitive status will need to be monitored closely as his status will likely be progressively declining. DIAGNOSTIC IMPRESSIONS:    ICD-10-CM ICD-9-CM    1. Dementia associated with other underlying disease without behavioral disturbance ([facility] [address]  F02.80 294.10        Time spent with patient in face to face conversation: 30 mins.     237[address] 30 minutes x 1    Daryl Clark, PHD

## 2021-05-03 ENCOUNTER — OFFICE VISIT (OUTPATIENT)
Dept: INTERNAL MEDICINE CLINIC | Age: 81
End: 2021-05-03
Payer: MEDICARE

## 2021-05-03 VITALS
DIASTOLIC BLOOD PRESSURE: 72 MMHG | TEMPERATURE: 98.1 F | BODY MASS INDEX: 30.4 KG/M2 | HEIGHT: 73 IN | WEIGHT: 229.38 LBS | HEART RATE: 68 BPM | SYSTOLIC BLOOD PRESSURE: 138 MMHG | RESPIRATION RATE: 18 BRPM | OXYGEN SATURATION: 93 %

## 2021-05-03 DIAGNOSIS — F03.90 DEMENTIA WITHOUT BEHAVIORAL DISTURBANCE, UNSPECIFIED DEMENTIA TYPE: ICD-10-CM

## 2021-05-03 DIAGNOSIS — E53.8 B12 DEFICIENCY: ICD-10-CM

## 2021-05-03 DIAGNOSIS — Z00.00 MEDICARE ANNUAL WELLNESS VISIT, SUBSEQUENT: Primary | ICD-10-CM

## 2021-05-03 DIAGNOSIS — E78.00 HYPERCHOLESTEROLEMIA: ICD-10-CM

## 2021-05-03 DIAGNOSIS — Z95.0 STATUS CARDIAC PACEMAKER: ICD-10-CM

## 2021-05-03 DIAGNOSIS — I10 ESSENTIAL HYPERTENSION: ICD-10-CM

## 2021-05-03 DIAGNOSIS — R73.9 HYPERGLYCEMIA: ICD-10-CM

## 2021-05-03 DIAGNOSIS — M79.605 PAIN OF LEFT LOWER EXTREMITY: ICD-10-CM

## 2021-05-03 DIAGNOSIS — E55.9 VITAMIN D DEFICIENCY: ICD-10-CM

## 2021-05-03 DIAGNOSIS — I49.5 SICK SINUS SYNDROME (HCC): ICD-10-CM

## 2021-05-03 DIAGNOSIS — L84 CALLUS OF FOOT: ICD-10-CM

## 2021-05-03 PROCEDURE — G8427 DOCREV CUR MEDS BY ELIG CLIN: HCPCS | Performed by: INTERNAL MEDICINE

## 2021-05-03 PROCEDURE — G8432 DEP SCR NOT DOC, RNG: HCPCS | Performed by: INTERNAL MEDICINE

## 2021-05-03 PROCEDURE — G0439 PPPS, SUBSEQ VISIT: HCPCS | Performed by: INTERNAL MEDICINE

## 2021-05-03 PROCEDURE — G8754 DIAS BP LESS 90: HCPCS | Performed by: INTERNAL MEDICINE

## 2021-05-03 PROCEDURE — 1101F PT FALLS ASSESS-DOCD LE1/YR: CPT | Performed by: INTERNAL MEDICINE

## 2021-05-03 PROCEDURE — 99214 OFFICE O/P EST MOD 30 MIN: CPT | Performed by: INTERNAL MEDICINE

## 2021-05-03 PROCEDURE — G0463 HOSPITAL OUTPT CLINIC VISIT: HCPCS | Performed by: INTERNAL MEDICINE

## 2021-05-03 PROCEDURE — G8536 NO DOC ELDER MAL SCRN: HCPCS | Performed by: INTERNAL MEDICINE

## 2021-05-03 PROCEDURE — G8417 CALC BMI ABV UP PARAM F/U: HCPCS | Performed by: INTERNAL MEDICINE

## 2021-05-03 PROCEDURE — G8752 SYS BP LESS 140: HCPCS | Performed by: INTERNAL MEDICINE

## 2021-05-03 NOTE — PROGRESS NOTES
RM: 15    No chief complaint on file. There were no vitals taken for this visit. Recent Travel Screening and Travel History documentation     Travel Screening      No screening recorded since 05/02/21 0000      Travel History   Travel since 04/03/21     No documented travel since 04/03/21        3 most recent PHQ Screens 4/2/2021   Little interest or pleasure in doing things Not at all   Feeling down, depressed, irritable, or hopeless Not at all   Total Score PHQ 2 0            1. Have you been to the ER, urgent care clinic since your last visit? Hospitalized since your last visit? 2. Have you seen or consulted any other health care providers outside of the [address] [place] [address] since your last visit? Include any pap smears or colon screening.       Health Maintenance Due   Topic Date Due    COVID-19 Vaccine (1) Never done    DTaP/Tdap/Td series (1 - Tdap) Never done    Shingrix Vaccine Age 50> (1 of 2) Never done    Medicare Yearly Exam  01/21/2021        Learning Assessment 4/2/2021   PRIMARY LEARNER Patient   HIGHEST LEVEL OF EDUCATION - PRIMARY LEARNER  -   BARRIERS PRIMARY LEARNER -   PRIMARY LANGUAGE ENGLISH   LEARNER PREFERENCE PRIMARY LISTENING     -     -     -   ANSWERED BY patient   RELATIONSHIP SELF

## 2021-05-03 NOTE — PROGRESS NOTES
This is the Subsequent Medicare Annual Wellness Exam, performed 12 months or more after the Initial AWV or the last Subsequent AWV    I have reviewed the patient's medical history in detail and updated the computerized patient record. Assessment/Plan   Education and counseling provided:  Are appropriate based on today's review and evaluation    ICD-10-CM ICD-9-CM    1. Medicare annual wellness visit, subsequent  Z00.00 V70.0    2. Essential hypertension  I10 401.9 CBC WITH AUTOMATED DIFF   3. Dementia without behavioral disturbance, unspecified dementia type ([facility] [address]  F03.90 294.20    4. Hypercholesterolemia  E78.00 272.0 CK      LIPID PANEL      METABOLIC PANEL, COMPREHENSIVE   5. Status cardiac pacemaker  Z95.0 V45.01    6. Vitamin D deficiency  E55.9 268.9 VITAMIN D, 25 HYDROXY   7. Hyperglycemia  R73.9 790.29 HEMOGLOBIN A1C WITH EAG   8. Sick sinus syndrome (HCC)  I49.5 427.81    9. B12 deficiency  E53.8 266.2 VITAMIN B12   10. Callus of foot  L84 700 REFERRAL TO PODIATRY   11. Pain of left lower extremity  M79.605 729.5 REFERRAL TO PODIATRY     Follow-up and Dispositions    · Return in about 3 months (around 8/3/2021), or if symptoms worsen or fail to improve, for blood pressure. results and schedule of future studies reviewed with patient, wife  reviewed diet, exercise and weight    cardiovascular risk and specific lipid/LDL goals reviewed  reviewed medications and side effects in detail       Depression Risk Factor Screening     3 most recent PHQ Screens 5/3/2021   Little interest or pleasure in doing things Not at all   Feeling down, depressed, irritable, or hopeless -   Total Score PHQ 2 -       Alcohol Risk Screen    Do you average more than 1 drink per night or more than 7 drinks a week: No    In the past three months have you have had more than 4 drinks containing alcohol on one occasion: No        Functional Ability and Level of Safety    Hearing: Hearing is good.       Activities of Daily Living: The home contains: no safety equipment. Patient needs help with:  transportation, preparing meals, housework, managing medications and managing money      Ambulation: with mild difficulty related to left toe pain     Fall Risk:  Fall Risk Assessment, last 12 mths 5/3/2021   Able to walk? Yes   Fall in past 12 months? 0   Do you feel unsteady?  0   Are you worried about falling 0      Abuse Screen:  Patient is not abused       Cognitive Screening    Has your family/caregiver stated any concerns about your memory: yes - dementia dx'd     Cognitive Screening: Abnormal - neuropsych testing    Health Maintenance Due     Health Maintenance Due   Topic Date Due    COVID-19 Vaccine (1) Never done    DTaP/Tdap/Td series (1 - Tdap) Never done    Shingrix Vaccine Age 50> (1 of 2) Never done       Patient Care Team   Patient Care Team:  Deidre Crowe MD as PCP - General (Internal Medicine)  Deidre Crowe MD as PCP - [facility] Empaneled Provider  Abby Alford MD (Cardiology)  Leydi Fairbanks MD (Dermatology)  Nohemy Landa MD (Orthopedic Surgery)    History     Patient Active Problem List   Diagnosis Code    Hypertension I10    Hypercholesterolemia E78.00    Colon polyps K63.5    Benign prostatic hyperplasia without lower urinary tract symptoms N40.0    Renal cysts, acquired, bilateral N28.1    Status cardiac pacemaker Z95.0    OA (osteoarthritis) of knee M17.10    Mild cognitive impairment with memory loss G31.84    Erectile dysfunction N52.9    Advanced directives, counseling/discussion Z71.89    Rising PSA level R97.20    Medication noncompliance due to cognitive impairment Z91.14    Sick sinus syndrome ([facility] [address] I49.5    Coronary artery disease involving native coronary artery of native heart without angina pectoris I25.10     Past Medical History:   Diagnosis Date    Anxiety     hx zoloft use    BPH (benign prostatic hypertrophy)     Bradycardia     syncope - s/p pacemaker -  Kanwal    Chest pain     neg stress test - l2/2011 - EF 61%    Colon polyps     Diverticulitis of colon     DJD (degenerative joint disease) of knee     ED (erectile dysfunction)     GERD (gastroesophageal reflux disease)     EGD - 5/11 - gastritis    Hypercholesterolemia     Hypertension     Mild cognitive impairment with memory loss     Mild pulmonary hypertension (HCC)     OA (osteoarthritis) of knee     left - Dr. Makenzie Faria Pacemaker     PFO with atrial septal aneurysm     Renal cysts, acquired, bilateral     S/P knee replacement     right    Screening for AAA (abdominal aortic aneurysm)     NL 5/11    SSS (sick sinus syndrome) (HCC)     Status cardiac pacemaker     Sun-damaged skin     Sunburn, blistering     Wears glasses       Past Surgical History:   Procedure Laterality Date    COLONOSCOPY N/A 2/13/2019    COLON[facility] performed by [facility]ya [facility] at [facility] ENDOSCOPY    HX ORTHOPAEDIC      R knee replacement - 2009ish    HX PACEMAKER      HX PACEMAKER PLACEMENT      HX TONSIL AND ADENOIDECTOMY       Current Outpatient Medications   Medication Sig Dispense Refill    ramipriL (ALTACE) 10 mg capsule Take 1 Cap by mouth daily. 90 Cap 1    hydroCHLOROthiazide (HYDRODIURIL) 12.5 mg tablet Take 1 Tab by mouth daily. 90 Tab 1    memantine (Namenda Titration Lucas) 5-10 mg DsPk Per dose pack 45 Tab 5    memantine (Namenda) 10 mg tablet Take 1 Tab by mouth two (2) times a day. 60 Tab 5    donepeziL (ARICEPT) 10 mg tablet Take 1 Tab by mouth nightly. 90 Tab 1    cyanocobalamin (VITAMIN B-12) 1,000 mcg tablet Take 1 Tab by mouth daily. 90 Tab 3    rosuvastatin (CRESTOR) 10 mg tablet TAKE 1 TABLET BY MOUTH EVERY NIGHT AT BEDTIME 90 Tab 3    sildenafil, antihypertensive, (REVATIO) 20 mg tablet TAKE 1 TABLET BY MOUTH EVERY DAY AS NEEDED 20 Tab 2    docosahexanoic acid/epa (FISH OIL PO) Take 2,400 mg by mouth nightly.       cholecalciferol (VITAMIN D3) 1,000 unit cap Take 1,000 Units by mouth daily.  acetaminophen (TYLENOL EXTRA STRENGTH) 500 mg tablet Take 500 mg by mouth as needed for Pain.  aspirin (ASPIRIN) 325 mg tablet Take 325 mg by mouth daily. No Known Allergies    Family History   Problem Relation Age of Onset    Dementia Mother     Diabetes Mother     Hypertension Mother     Hypertension Brother      Social History     Tobacco Use    Smoking status: Former Smoker     Types: Cigarettes     Quit date: 1964     Years since quittin.6    Smokeless tobacco: Never Used   Substance Use Topics    Alcohol use:  Yes     Alcohol/week: 7.0 standard drinks     Types: 7 Cans of beer per week     Comment: 1 beer/day         Ean Panda MD

## 2021-05-03 NOTE — PROGRESS NOTES
HPI:  established patient  Presents for f/u dementia, lipids, etc    Accompanied by wife    Left foot injury  Some limp and slow walking  Unclear of mechanism or circumstances of injury    Reports UTD on COVID vaccine. Reportedly taking meds as Rx'd  Using pill box. No CP, SOB, neuro sx    No new complaints. Past medical, Social, and Family history reviewed    Prior to Admission medications    Medication Sig Start Date End Date Taking? Authorizing Provider   ramipriL (ALTACE) 10 mg capsule Take 1 Cap by mouth daily. 3/18/21  Yes Catrachita Casey MD   hydroCHLOROthiazide (HYDRODIURIL) 12.5 mg tablet Take 1 Tab by mouth daily. 3/8/21  Yes Catrachita Casey MD   memantine (Namenda Titration Lucas) 5-10 mg DsPk Per dose pack 3/2/21  Yes Catrachita Casey MD   memantine (Namenda) 10 mg tablet Take 1 Tab by mouth two (2) times a day. 3/2/21  Yes Catrachita Casey MD   donepeziL (ARICEPT) 10 mg tablet Take 1 Tab by mouth nightly. 3/2/21  Yes Catrachita Casey MD   cyanocobalamin (VITAMIN B-12) 1,000 mcg tablet Take 1 Tab by mouth daily. 8/19/19  Yes Nallely Suazo MD   rosuvastatin (CRESTOR) 10 mg tablet TAKE 1 TABLET BY MOUTH EVERY NIGHT AT BEDTIME 8/19/19  Yes Nallely Suazo MD   sildenafil, antihypertensive, (REVATIO) 20 mg tablet TAKE 1 TABLET BY MOUTH EVERY DAY AS NEEDED 4/18/19  Yes Catrachita Casey MD   docosahexanoic acid/epa (FISH OIL PO) Take 2,400 mg by mouth nightly. Yes Provider, Historical   cholecalciferol (VITAMIN D3) 1,000 unit cap Take 1,000 Units by mouth daily. Yes Provider, Historical   acetaminophen (TYLENOL EXTRA STRENGTH) 500 mg tablet Take 500 mg by mouth as needed for Pain. Yes Provider, Historical   aspirin (ASPIRIN) 325 mg tablet Take 325 mg by mouth daily. Yes Provider, Historical          ROS  Complete ROS reviewed and negative or stable except as noted in HPI. Physical Exam  Vitals signs and nursing note reviewed.    Constitutional:       General: He is not in acute distress. HENT:      Head: Normocephalic and atraumatic. Eyes:      General: No scleral icterus. Pupils: Pupils are equal, round, and reactive to light. Neck:      Musculoskeletal: Normal range of motion and neck supple. Vascular: No JVD. Cardiovascular:      Rate and Rhythm: Normal rate and regular rhythm. Heart sounds: Normal heart sounds. No murmur. No friction rub. No gallop. Pulmonary:      Effort: Pulmonary effort is normal. No respiratory distress. Breath sounds: Normal breath sounds. No wheezing or rales. Abdominal:      General: There is no distension. Palpations: Abdomen is soft. Tenderness: There is no abdominal tenderness. Musculoskeletal: Normal range of motion. General: Tenderness (overlying the callous only) and deformity (callous of left 5th digit, no ulceration) present. No swelling. Lymphadenopathy:      Cervical: No cervical adenopathy. Skin:     General: Skin is warm. Findings: No rash. Neurological:      General: No focal deficit present. Mental Status: He is alert and oriented to person, place, and time. Mental status is at baseline. Motor: No abnormal muscle tone. Coordination: Coordination normal.      Comments: Oriented to person and place. Prior labs reviewed. Assessment/Plan:    ICD-10-CM ICD-9-CM    1. Dementia without behavioral disturbance, unspecified [facility]  F03.90 294.20    2. Medicare annual wellness visit, subsequent  Z00.00 V70.0    3. Essential hypertension  I10 401.9 CBC WITH AUTOMATED DIFF   4. Hypercholesterolemia  E78.00 272.0 CK      LIPID PANEL      METABOLIC PANEL, COMPREHENSIVE   5. Status cardiac pacemaker  Z95.0 V45.01    6. Vitamin D deficiency  E55.9 268.9 VITAMIN D, 25 HYDROXY   7. Hyperglycemia  R73.9 790.29 HEMOGLOBIN A1C WITH EAG   8. Sick sinus syndrome (HCC)  I49.5 427.81    9. B12 deficiency  E53.8 266.2 VITAMIN B12   10.  Callus of foot  L84 1211 [address] Pain of left lower extremity  M79.605 729.5 REFERRAL TO PODIATRY     Follow-up and Dispositions    · Return in about 3 months (around 8/3/2021), or if symptoms worsen or fail to improve, for blood pressure. results and schedule of future studies reviewed with patient  reviewed diet, exercise and weight  cardiovascular risk and specific lipid/LDL goals reviewed  reviewed medications and side effects in detail    Return for fasting labs  Wife to bring us the COVID vaccine dates  Ref podiatry  Continue current medications       An electronic signature was used to authenticate this note.   -- Zofia Glass MD

## 2021-05-03 NOTE — PATIENT INSTRUCTIONS
Medicare Wellness Visit, Male    The best way to live healthy is to have a lifestyle where you eat a well-balanced diet, exercise regularly, limit alcohol use, and quit all forms of tobacco/nicotine, if applicable. Regular preventive services are another way to keep healthy. Preventive services (vaccines, screening tests, monitoring & exams) can help personalize your care plan, which helps you manage your own care. Screening tests can find health problems at the earliest stages, when they are easiest to treat. [facility] follows the current, evidence-based guidelines published by the [facility] ([facility]) when recommending preventive services for our patients. Because we follow these guidelines, sometimes recommendations change over time as research supports it. (For example, a prostate screening blood test is no longer routinely recommended for men with no symptoms). Of course, you and your doctor may decide to screen more often for some diseases, based on your risk and co-morbidities (chronic disease you are already diagnosed with). Preventive services for you include:  - Medicare offers their members a free annual wellness visit, which is time for you and your primary care provider to discuss and plan for your preventive service needs. Take advantage of this benefit every year!  -All adults over age 72 should receive the recommended pneumonia vaccines. Current USPSTF guidelines recommend a series of two vaccines for the best pneumonia protection.   -All adults should have a flu vaccine yearly and tetanus vaccine every 10 years.  -All adults age 48 and older should receive the shingles vaccines (series of two vaccines).        -All adults age 38-68 who are overweight should have a diabetes screening test once every three years.   -Other screening tests & preventive services for persons with diabetes include: an eye exam to screen for diabetic retinopathy, a kidney function test, a foot exam, and stricter control over your cholesterol.   -Cardiovascular screening for adults with routine risk involves an electrocardiogram (ECG) at intervals determined by the provider.   -Colorectal cancer screening should be done for adults age 54-65 with no increased risk factors for colorectal cancer. There are a number of acceptable methods of screening for this type of cancer. Each test has its own benefits and drawbacks. Discuss with your provider what is most appropriate for you during your annual wellness visit. The different tests include: colonoscopy (considered the best screening method), a fecal occult blood test, a fecal DNA test, and sigmoidoscopy.  -All adults born between [facility] should be screened once for Hepatitis C.  -An Abdominal Aortic Aneurysm (AAA) Screening is recommended for men age 73-68 who has ever smoked in their lifetime.      Here is a list of your current Health Maintenance items (your personalized list of preventive services) with a due date:  Health Maintenance Due   Topic Date Due    COVID-19 Vaccine (1) Never done    DTaP/Tdap/Td  (1 - Tdap) Never done    Shingles Vaccine (1 of 2) Never done       Ask pharmacy about shingles and Tdap vaccines

## 2021-05-07 ENCOUNTER — APPOINTMENT (OUTPATIENT)
Dept: INTERNAL MEDICINE CLINIC | Age: 81
End: 2021-05-07

## 2021-05-07 DIAGNOSIS — E55.9 VITAMIN D DEFICIENCY: ICD-10-CM

## 2021-05-07 DIAGNOSIS — E78.00 HYPERCHOLESTEROLEMIA: ICD-10-CM

## 2021-05-07 DIAGNOSIS — E53.8 B12 DEFICIENCY: ICD-10-CM

## 2021-05-07 DIAGNOSIS — R73.9 HYPERGLYCEMIA: ICD-10-CM

## 2021-05-07 DIAGNOSIS — I10 ESSENTIAL HYPERTENSION: ICD-10-CM

## 2021-05-07 LAB
25(OH)D3 SERPL-MCNC: 40.2 NG/ML (ref 30–100)
ALBUMIN SERPL-MCNC: 4.2 G/DL (ref 3.5–5)
ALBUMIN/GLOB SERPL: 1.4 {RATIO} (ref 1.1–2.2)
ALP SERPL-CCNC: 80 U/L (ref 45–117)
ALT SERPL-CCNC: 31 U/L (ref 12–78)
ANION GAP SERPL CALC-SCNC: 10 MMOL/L (ref 5–15)
AST SERPL-CCNC: 21 U/L (ref 15–37)
BASOPHILS # BLD: 0 K/UL (ref 0–0.1)
BASOPHILS NFR BLD: 0 % (ref 0–1)
BILIRUB SERPL-MCNC: 0.7 MG/DL (ref 0.2–1)
BUN SERPL-MCNC: 15 MG/DL (ref 6–20)
BUN/CREAT SERPL: 12 (ref 12–20)
CALCIUM SERPL-MCNC: 9.2 MG/DL (ref 8.5–10.1)
CHLORIDE SERPL-SCNC: 107 MMOL/L (ref 97–108)
CHOLEST SERPL-MCNC: 135 MG/DL
CK SERPL-CCNC: 99 U/L (ref 39–308)
CO2 SERPL-SCNC: 24 MMOL/L (ref 21–32)
CREAT SERPL-MCNC: 1.24 MG/DL (ref 0.7–1.3)
DIFFERENTIAL METHOD BLD: ABNORMAL
EOSINOPHIL # BLD: 0.2 K/UL (ref 0–0.4)
EOSINOPHIL NFR BLD: 3 % (ref 0–7)
ERYTHROCYTE [DISTWIDTH] IN BLOOD BY AUTOMATED COUNT: 12.3 % (ref 11.5–14.5)
EST. AVERAGE GLUCOSE BLD GHB EST-MCNC: 108 MG/DL
GLOBULIN SER CALC-MCNC: 2.9 G/DL (ref 2–4)
GLUCOSE SERPL-MCNC: 103 MG/DL (ref 65–100)
HBA1C MFR BLD: 5.4 % (ref 4–5.6)
HCT VFR BLD AUTO: 48 % (ref 36.6–50.3)
HDLC SERPL-MCNC: 40 MG/DL
HDLC SERPL: 3.4 {RATIO} (ref 0–5)
HGB BLD-MCNC: 15.6 G/DL (ref 12.1–17)
IMM GRANULOCYTES # BLD AUTO: 0 K/UL (ref 0–0.04)
IMM GRANULOCYTES NFR BLD AUTO: 1 % (ref 0–0.5)
LDLC SERPL CALC-MCNC: 72 MG/DL (ref 0–100)
LIPID PROFILE,FLP: NORMAL
LYMPHOCYTES # BLD: 2 K/UL (ref 0.8–3.5)
LYMPHOCYTES NFR BLD: 30 % (ref 12–49)
MCH RBC QN AUTO: 31.6 PG (ref 26–34)
MCHC RBC AUTO-ENTMCNC: 32.5 G/DL (ref 30–36.5)
MCV RBC AUTO: 97.4 FL (ref 80–99)
MONOCYTES # BLD: 0.6 K/UL (ref 0–1)
MONOCYTES NFR BLD: 9 % (ref 5–13)
NEUTS SEG # BLD: 3.9 K/UL (ref 1.8–8)
NEUTS SEG NFR BLD: 57 % (ref 32–75)
NRBC # BLD: 0 K/UL (ref 0–0.01)
NRBC BLD-RTO: 0 PER 100 WBC
PLATELET # BLD AUTO: 206 K/UL (ref 150–400)
PMV BLD AUTO: 11 FL (ref 8.9–12.9)
POTASSIUM SERPL-SCNC: 4.2 MMOL/L (ref 3.5–5.1)
PROT SERPL-MCNC: 7.1 G/DL (ref 6.4–8.2)
RBC # BLD AUTO: 4.93 M/UL (ref 4.1–5.7)
SODIUM SERPL-SCNC: 141 MMOL/L (ref 136–145)
TRIGL SERPL-MCNC: 115 MG/DL (ref ?–150)
VIT B12 SERPL-MCNC: 318 PG/ML (ref 193–986)
VLDLC SERPL CALC-MCNC: 23 MG/DL
WBC # BLD AUTO: 6.8 K/UL (ref 4.1–11.1)

## 2021-07-26 ENCOUNTER — TRANSCRIBE ORDER (OUTPATIENT)
Dept: NEUROLOGY | Age: 81
End: 2021-07-26

## 2021-07-26 DIAGNOSIS — F03.90 DEMENTIA WITHOUT BEHAVIORAL DISTURBANCE, UNSPECIFIED DEMENTIA TYPE: ICD-10-CM

## 2021-07-26 RX ORDER — DONEPEZIL HYDROCHLORIDE 10 MG/1
10 TABLET, FILM COATED ORAL
Qty: 90 TABLET | Refills: 1 | Status: CANCELLED | OUTPATIENT
Start: 2021-07-26

## 2021-07-26 NOTE — TELEPHONE ENCOUNTER
----- Message from Chelle Meehan sent at 7/26/2021 11:35 AM EDT -----  Regarding: ASH/REFILL  Contact: [phone]  Medication Refill    Caller (if not patient): Kortney Yost (spouse)      Relationship of caller (if not patient):      Best contact number(s): [phone]      Name of medication and dosage if known: DonepeziL (Aricept)       Is patient out of this medication (yes/no): Yes      Pharmacy name: [facility] and [address] [place] [address])    Pharmacy listed in chart? (yes/no): Yes    Pharmacy phone number: N/A      Details to clarify the request: Refill request for DonepeziL (Aricept)       Chelle Meehan

## 2021-07-26 NOTE — TELEPHONE ENCOUNTER
CV/Thoracic Surgery Discharge Summary     Patient ID:  Parker Araiza  [id number]  56 year old  1961    Admit date: 7/22/2018    Discharge date and time: 7/29/2018    Discharge Diagnoses:   Concussion                                      01/24/2013      Comment: playing [employer]    Elevated blood pressure reading without diagno* 05/20/2011    Depressive disorder, not elsewhere classified                 Hypothyroid                                                   Hyperlipemia                                                  Seizures (CMS/HCC)                                            Carpal tunnel syndrome                                        Procedure:  1. Cardiac Catheterization with Dr. Lemus on 7/23/2018  2. Urgent coronary artery bypass grafting x4 grafts with the left internal thoracic artery placed to left anterior descending coronary artery.  Saphenous vein graft placed sequentially side-to-side to the first diagonal branch of the left anterior descending coronary artery and end-to-side to the second obtuse marginal branch of the circumflex coronary artery.  Saphenous vein graft placed to the distal right coronary artery.   Endoscopic harvesting of the greater saphenous vein from the right lower extremity.    Consults: pulmonary/intensive care    Complications: none    Hospital Course: This is a 56 year old male with a PMH of HTN, HLD, and hypothyroidism who presented with complaints of chest pain.  His work up included a cardiac catheterization that revealed CAD.  He was seen by Dr. Gonzalez for possible surgical intervention and agreed to undergo surgery on 7/25/2018.  Post operatively, he transferred from the operating room to CVICU in critical but stable condition.  He transferred with the support of dobutamine and vasopressin.  These support medications were weaned as he became more hemodynamically stable.  He was acidotic with the initial weaning protocol from the ventilator.   Duplicate request: Aricept 10 mg #90 with 1 refill was sent to [address] [place] [address] on 03/02/2021. Last visit 05/03/2021 MD Marilyn Torres   Next appointment 08/04/2021 MD Marilyn Torres       Requested Prescriptions     Pending Prescriptions Disp Refills    donepeziL (ARICEPT) 10 mg tablet 90 Tablet 1     Sig: Take 1 Tablet by mouth nightly. Adjustments were made by critical care.  He awoke from general anesthesia without focal neurological deficits.  He experienced expected acute post op blood loss anemia and was given a blood transfusion.  He was then extubated from the ventilator.  His vasopressin and dobutamine were weaned off.  He experienced signs and symptoms of hypervolemia and was given a diuretic.  His BUN and creat remained normal.  His chest tubes were removed without difficulty.  His chest xray remained stable.  Cardiac rehab assisted him through a program of graded activity and ambulation.  He was transferred to a telemetry floor.  His diet was increased.  He was able to ambulate in the hallway without difficulty.  He was tolerating his diet, having bowel movements, and tolerating his pain medications.  Initially post operatively, he was in sinus rhythm and remained in sinus rhythm throughout his hospital stay.  On post operative day 4, it was determined that he was hemodynamically stable for discharge to home with home health.  He was discharged on an aspirin, beta blocker, statin, acei, and daily diuretic per CMS guidelines.      Discharge Exam:  Heart: Regular rate and rhythm and S1, S2 normal  Lungs: Diminished breath sounds  bilaterally  Abdomen: semi-soft, active bowel sounds, non-tender. +BM  Incision(s): Sternal Clean, dry and intact  Neurologic: Grossly normal  Extremities: edema trace and pulses +1 B/L DP    Vitals:   Vitals:    07/29/18 0750   BP: 102/54   Pulse: 72   Resp: 18   Temp: 98.1 °F (36.7 °C)       Weight:   Wt Readings from Last 1 Encounters:   07/29/18 92.1 kg   admit wt 101.6kg    Labs:   Lab Results   Component Value Date/Time    SODIUM 135 07/29/2018 03:40 AM    POTASSIUM 4.0 07/29/2018 03:40 AM    CHLORIDE 100 07/29/2018 03:40 AM    CO2 28 07/29/2018 03:40 AM    BUN 22 (H) 07/29/2018 03:40 AM    CREATININE 1.12 07/29/2018 03:40 AM    GLUCOSE 112 (H) 07/29/2018 03:40 AM    WBC 9.5 07/28/2018 07:10 AM    HGB 8.0  (L) 07/28/2018 07:10 AM    HCT 23.5 (L) 07/28/2018 07:10 AM     (L) 07/28/2018 07:10 AM     02/06/2012 01:50 PM    PT 11.4 07/26/2018 03:30 AM    INR 1.1 07/26/2018 03:30 AM    PTT 24 07/25/2018 05:50 PM       Disposition: home with Home Care Services    Patient Instructions:   Activity: activity as tolerated, no driving for 2 weeks and no heavy lifting for 4 weeks  Diet: Low fat, low cholesterol  Nursing Cares: keep wound clean and dry and wash incision(s) daily with soap and water    See AVS for patient discharge instructions, medications, and physician follow up appointment(s).    Signed:  Carolynn Levy NP  7/30/2018  5:57 AM

## 2021-08-04 ENCOUNTER — OFFICE VISIT (OUTPATIENT)
Dept: INTERNAL MEDICINE CLINIC | Age: 81
End: 2021-08-04
Payer: MEDICARE

## 2021-08-04 VITALS
BODY MASS INDEX: 29.77 KG/M2 | WEIGHT: 224.6 LBS | SYSTOLIC BLOOD PRESSURE: 121 MMHG | HEART RATE: 60 BPM | RESPIRATION RATE: 16 BRPM | TEMPERATURE: 98.4 F | HEIGHT: 73 IN | OXYGEN SATURATION: 95 % | DIASTOLIC BLOOD PRESSURE: 74 MMHG

## 2021-08-04 DIAGNOSIS — Z95.0 STATUS CARDIAC PACEMAKER: ICD-10-CM

## 2021-08-04 DIAGNOSIS — E78.00 HYPERCHOLESTEROLEMIA: ICD-10-CM

## 2021-08-04 DIAGNOSIS — N40.0 BENIGN PROSTATIC HYPERPLASIA WITHOUT LOWER URINARY TRACT SYMPTOMS: ICD-10-CM

## 2021-08-04 DIAGNOSIS — I10 ESSENTIAL HYPERTENSION: Primary | ICD-10-CM

## 2021-08-04 DIAGNOSIS — F03.90 DEMENTIA WITHOUT BEHAVIORAL DISTURBANCE, UNSPECIFIED DEMENTIA TYPE: ICD-10-CM

## 2021-08-04 DIAGNOSIS — I25.10 CORONARY ARTERY DISEASE INVOLVING NATIVE CORONARY ARTERY OF NATIVE HEART WITHOUT ANGINA PECTORIS: ICD-10-CM

## 2021-08-04 PROCEDURE — 99214 OFFICE O/P EST MOD 30 MIN: CPT | Performed by: INTERNAL MEDICINE

## 2021-08-04 PROCEDURE — G8417 CALC BMI ABV UP PARAM F/U: HCPCS | Performed by: INTERNAL MEDICINE

## 2021-08-04 PROCEDURE — G0463 HOSPITAL OUTPT CLINIC VISIT: HCPCS | Performed by: INTERNAL MEDICINE

## 2021-08-04 PROCEDURE — G8510 SCR DEP NEG, NO PLAN REQD: HCPCS | Performed by: INTERNAL MEDICINE

## 2021-08-04 PROCEDURE — G8536 NO DOC ELDER MAL SCRN: HCPCS | Performed by: INTERNAL MEDICINE

## 2021-08-04 PROCEDURE — G8754 DIAS BP LESS 90: HCPCS | Performed by: INTERNAL MEDICINE

## 2021-08-04 PROCEDURE — G8427 DOCREV CUR MEDS BY ELIG CLIN: HCPCS | Performed by: INTERNAL MEDICINE

## 2021-08-04 PROCEDURE — 1101F PT FALLS ASSESS-DOCD LE1/YR: CPT | Performed by: INTERNAL MEDICINE

## 2021-08-04 PROCEDURE — G8752 SYS BP LESS 140: HCPCS | Performed by: INTERNAL MEDICINE

## 2021-08-04 RX ORDER — MEMANTINE HYDROCHLORIDE 10 MG/1
10 TABLET ORAL 2 TIMES DAILY
Qty: 180 TABLET | Refills: 3 | Status: SHIPPED | OUTPATIENT
Start: 2021-08-04

## 2021-08-04 RX ORDER — RAMIPRIL 10 MG/1
10 CAPSULE ORAL DAILY
Qty: 90 CAPSULE | Refills: 1 | Status: SHIPPED | OUTPATIENT
Start: 2021-08-04 | End: 2022-05-13

## 2021-08-04 RX ORDER — ROSUVASTATIN CALCIUM 10 MG/1
TABLET, COATED ORAL
Qty: 90 TABLET | Refills: 3 | Status: SHIPPED | OUTPATIENT
Start: 2021-08-04 | End: 2022-08-18

## 2021-08-04 RX ORDER — DONEPEZIL HYDROCHLORIDE 10 MG/1
10 TABLET, FILM COATED ORAL
Qty: 90 TABLET | Refills: 1 | Status: SHIPPED | OUTPATIENT
Start: 2021-08-04 | End: 2022-10-11 | Stop reason: SDUPTHER

## 2021-08-04 RX ORDER — HYDROCHLOROTHIAZIDE 12.5 MG/1
12.5 TABLET ORAL DAILY
Qty: 90 TABLET | Refills: 1 | Status: SHIPPED | OUTPATIENT
Start: 2021-08-04 | End: 2021-11-26

## 2021-08-04 NOTE — PROGRESS NOTES
HPI:  established patient  Presents for f/u HTN, etc    Accompanied by wife. BP better today     Taking and tolerating meds    Stable and doing well at this point. No CP, SOB, neuro sx    Pt and wife report no new complaints and state that he is stable. Past medical, Social, and Family history reviewed    Prior to Admission medications    Medication Sig Start Date End Date Taking? Authorizing Provider   ramipriL (ALTACE) 10 mg capsule Take 1 Cap by mouth daily. 3/18/21  Yes Mimi Larsen MD   hydroCHLOROthiazide (HYDRODIURIL) 12.5 mg tablet Take 1 Tab by mouth daily. 3/8/21  Yes Mimi Larsen MD   memantine (Namenda) 10 mg tablet Take 1 Tab by mouth two (2) times a day. 3/2/21  Yes Mimi Larsen MD   donepeziL (ARICEPT) 10 mg tablet Take 1 Tab by mouth nightly. 3/2/21  Yes Mimi Larsen MD   rosuvastatin (CRESTOR) 10 mg tablet TAKE 1 TABLET BY MOUTH EVERY NIGHT AT BEDTIME 8/19/19  Yes Deven Coronado MD   docosahexanoic acid/epa (FISH OIL PO) Take 2,400 mg by mouth nightly. Yes Provider, Historical   cholecalciferol (VITAMIN D3) 1,000 unit cap Take 1,000 Units by mouth daily. Yes Provider, Historical   acetaminophen (TYLENOL EXTRA STRENGTH) 500 mg tablet Take 500 mg by mouth as needed for Pain. Yes Provider, Historical   aspirin (ASPIRIN) 325 mg tablet Take 325 mg by mouth daily. Yes Provider, Historical   memantine (Namenda Titration Lucas) 5-10 mg DsPk Per dose pack  Patient not taking: Reported on 8/4/2021 3/2/21   Mimi Larsen MD   cyanocobalamin (VITAMIN B-12) 1,000 mcg tablet Take 1 Tab by mouth daily. Patient not taking: Reported on 8/4/2021 8/19/19   Deven Coronado MD   sildenafil, antihypertensive, (REVATIO) 20 mg tablet TAKE 1 TABLET BY MOUTH EVERY DAY AS NEEDED  Patient not taking: Reported on 8/4/2021 4/18/19   Mimi Larsen MD          ROS  Complete ROS reviewed and negative or stable except as noted in HPI.       Physical Exam  Vitals and nursing note reviewed. Constitutional:       General: He is not in acute distress. HENT:      Head: Normocephalic and atraumatic. Eyes:      General: No scleral icterus. Pupils: Pupils are equal, round, and reactive to light. Neck:      Vascular: No JVD. Cardiovascular:      Rate and Rhythm: Normal rate and regular rhythm. Heart sounds: Normal heart sounds. No murmur heard. No friction rub. No gallop. Pulmonary:      Effort: Pulmonary effort is normal. No respiratory distress. Breath sounds: Normal breath sounds. No wheezing or rales. Abdominal:      General: There is no distension. Palpations: Abdomen is soft. Tenderness: There is no abdominal tenderness. Musculoskeletal:         General: Normal range of motion. Cervical back: Normal range of motion and neck supple. Lymphadenopathy:      Cervical: No cervical adenopathy. Skin:     General: Skin is warm. Findings: No rash. Neurological:      General: No focal deficit present. Mental Status: He is alert and oriented to person, place, and time. Mental status is at baseline. Motor: No abnormal muscle tone. Coordination: Coordination normal.      Comments: Oriented to person and place. Prior labs reviewed. Assessment/Plan:    ICD-10-CM ICD-9-CM    1. Essential hypertension  I10 401.9 ramipriL (ALTACE) 10 mg capsule   2. Dementia without behavioral disturbance, unspecified dementia type (HCC)  F03.90 294.20 memantine (Namenda) 10 mg tablet      donepeziL (ARICEPT) 10 mg tablet   3. Hypercholesterolemia  E78.00 272.0 rosuvastatin (Crestor) 10 mg tablet   4. Status cardiac pacemaker  Z95.0 V45.01    5. Coronary artery disease involving native coronary artery of native heart without angina pectoris  I25.10 414.01    6.  Benign prostatic hyperplasia without lower urinary tract symptoms  N40.0 600.00      Follow-up and Dispositions    · Return in about 3 months (around 11/4/2021), or if symptoms worsen or fail to improve, for blood pressure, cholesterol. results and schedule of future studies reviewed with patient, wife  reviewed diet, exercise and weight  cardiovascular risk and specific lipid/LDL goals reviewed  reviewed medications and side effects in detail    Continue current medications   Refills sent in      An electronic signature was used to authenticate this note.   -- Michelle Shaw MD

## 2021-08-04 NOTE — PROGRESS NOTES
Rm 14  Recent Travel Screening and Travel History documentation     Travel Screening     Question   Response    In the last month, have you been in contact with someone who was confirmed or suspected to have Coronavirus / COVID-19? No / Unsure    Have you had a COVID-19 viral test in the last 14 days? No    Do you have any of the following new or worsening symptoms? None of these    Have you traveled internationally or domestically in the last month? No      Travel History   Travel since 07/04/21     No documented travel since 07/04/21          Chief Complaint   Patient presents with    Blood Pressure Check     Pt is not fasting    1. Have you been to the ER, urgent care clinic since your last visit? Hospitalized since your last visit? No    2. Have you seen or consulted any other health care providers outside of the [address] [place] [address] since your last visit? Include any pap smears or colon screening. No        Health Maintenance Due   Topic Date Due    COVID-19 Vaccine (1) Never done    DTaP/Tdap/Td series (1 - Tdap) Never done    Shingrix Vaccine Age 50> (1 of 2) Never done           3 most recent PHQ Screens 8/4/2021   Little interest or pleasure in doing things Not at all   Feeling down, depressed, irritable, or hopeless Not at all   Total Score PHQ 2 0       Fall Risk Assessment, last 12 mths 8/4/2021   Able to walk? Yes   Fall in past 12 months? 0   Do you feel unsteady? 0   Are you worried about falling 0           Learning Assessment 4/2/2021   PRIMARY LEARNER Patient   HIGHEST LEVEL OF EDUCATION - PRIMARY LEARNER  -   BARRIERS PRIMARY LEARNER -   PRIMARY LANGUAGE ENGLISH   LEARNER PREFERENCE PRIMARY LISTENING     -     -     -   ANSWERED BY patient   RELATIONSHIP SELF         An After Visit Summary was printed and given to the patient.

## 2021-10-15 ENCOUNTER — OFFICE VISIT (OUTPATIENT)
Dept: NEUROLOGY | Age: 81
End: 2021-10-15
Payer: MEDICARE

## 2021-10-15 VITALS
WEIGHT: 224 LBS | RESPIRATION RATE: 18 BRPM | HEART RATE: 64 BPM | SYSTOLIC BLOOD PRESSURE: 102 MMHG | BODY MASS INDEX: 29.69 KG/M2 | HEIGHT: 73 IN | DIASTOLIC BLOOD PRESSURE: 60 MMHG | OXYGEN SATURATION: 98 %

## 2021-10-15 DIAGNOSIS — G30.1 DEMENTIA OF THE ALZHEIMER'S TYPE WITH LATE ONSET WITHOUT BEHAVIORAL DISTURBANCE (HCC): Primary | ICD-10-CM

## 2021-10-15 DIAGNOSIS — F02.80 DEMENTIA OF THE ALZHEIMER'S TYPE WITH LATE ONSET WITHOUT BEHAVIORAL DISTURBANCE (HCC): Primary | ICD-10-CM

## 2021-10-15 PROCEDURE — G8752 SYS BP LESS 140: HCPCS | Performed by: PSYCHIATRY & NEUROLOGY

## 2021-10-15 PROCEDURE — 99215 OFFICE O/P EST HI 40 MIN: CPT | Performed by: PSYCHIATRY & NEUROLOGY

## 2021-10-15 PROCEDURE — G8536 NO DOC ELDER MAL SCRN: HCPCS | Performed by: PSYCHIATRY & NEUROLOGY

## 2021-10-15 PROCEDURE — G8510 SCR DEP NEG, NO PLAN REQD: HCPCS | Performed by: PSYCHIATRY & NEUROLOGY

## 2021-10-15 PROCEDURE — G8754 DIAS BP LESS 90: HCPCS | Performed by: PSYCHIATRY & NEUROLOGY

## 2021-10-15 PROCEDURE — G8427 DOCREV CUR MEDS BY ELIG CLIN: HCPCS | Performed by: PSYCHIATRY & NEUROLOGY

## 2021-10-15 PROCEDURE — G8417 CALC BMI ABV UP PARAM F/U: HCPCS | Performed by: PSYCHIATRY & NEUROLOGY

## 2021-10-15 PROCEDURE — 1101F PT FALLS ASSESS-DOCD LE1/YR: CPT | Performed by: PSYCHIATRY & NEUROLOGY

## 2021-10-15 NOTE — PATIENT INSTRUCTIONS
[facility]   Statement to Patients  April 1, 2014      In an effort to ensure the large volume of patient prescription refills is processed in the most efficient and expeditious manner, we are asking our patients to assist us by calling your Pharmacy for all prescription refills, this will include also your  Mail Order Pharmacy. The pharmacy will contact our office electronically to continue the refill process. Please do not wait until the last minute to call your pharmacy. We need at least 48 hours (2days) to fill prescriptions. We also encourage you to call your pharmacy before going to  your prescription to make sure it is ready. With regard to controlled substance prescription refill requests (narcotic refills) that need to be picked up at our office, we ask your cooperation by providing us with at least 72 hours (3days) notice that you will need a refill. We will not refill narcotic prescription refill requests after 4:00pm on any weekday, Monday through Thursday, or after 2:00pm on Fridays, or on the weekends. We encourage everyone to explore another way of getting your prescription refill request processed using [employer], our patient web portal through our electronic medical record system. [employer] is an efficient and effective way to communicate your medication request directly to the office and  downloadable as an yaa on your smart phone . [employer] also features a review functionality that allows you to view your medication list as well as leave messages for your physician. Are you ready to get connected? If so please review the attatched instructions or speak to any of our staff to get you set up right away! Thank you so much for your cooperation. Should you have any questions please contact our Practice Administrator.     The Physicians and Staff,  [facility]

## 2021-10-15 NOTE — PROGRESS NOTES
Mr. Marylen Carrow presents today to follow up memory loss. Patient's spouse reported patient's memory is slightly worse. Depression screening done on this patient.

## 2021-10-15 NOTE — PROGRESS NOTES
Chief Complaint   Patient presents with    Dementia       HISTORY OF PRESENT ILLNESS  Ayan Hernandes. came back for follow-up. He states that he is doing better as far as memory is concerned but wife does not think so. He would often forget that he has already eaten and will ask for a meal again. She supervises his medications. He does stay active, goes out for walks and sometimes he will go out with friends to play golf. Watches television most of the time. It is mostly sports but sometimes he will watch game shows. Personal hygiene is not an issue. He has been advised not to drive but sometimes he will drive to the grocery store. Comprehensive neuropsychological assessment was reviewed and it was consistent with moderate to severe cognitive disorder, likely Alzheimer's dementia    RECAP  He was diagnosed with dementia about 5 years ago and has had progression in his functional and cognitive abilities over the last year or so. He came along with his daughter who lives in [place]. He has a son who lives up in [facility]. He is living with his wife in an independent living community. He is quite forgetful at baseline, will often repeat himself, forgets that he has already eaten, will pour some cereal in a bowl and then forgets about it and pours another one, forgets his appointments. He has been overly dependent on his wife and  and gets very nervous and anxious in her absence. Remains independent with basic activities of daily living and takes good care of his personal hygiene. He is not driving anymore. He is a retired  but recently has made mistakes in paying bills, writing checks etc. and family now overlooks everything. Denies any problems with sleep. No behavioral issues. Current Outpatient Medications   Medication Sig    memantine (Namenda) 10 mg tablet Take 1 Tablet by mouth two (2) times a day.     rosuvastatin (Crestor) 10 mg tablet TAKE 1 TABLET BY MOUTH EVERY NIGHT AT BEDTIME    ramipriL (ALTACE) 10 mg capsule Take 1 Capsule by mouth daily.  hydroCHLOROthiazide (HYDRODIURIL) 12.5 mg tablet Take 1 Tablet by mouth daily.  donepeziL (ARICEPT) 10 mg tablet Take 1 Tablet by mouth nightly.  sildenafil, antihypertensive, (REVATIO) 20 mg tablet TAKE 1 TABLET BY MOUTH EVERY DAY AS NEEDED    docosahexanoic acid/epa (FISH OIL PO) Take 2,400 mg by mouth nightly.  acetaminophen (TYLENOL EXTRA STRENGTH) 500 mg tablet Take 500 mg by mouth as needed for Pain.  aspirin (ASPIRIN) 325 mg tablet Take 325 mg by mouth daily.  memantine (Namenda Titration Lucas) 5-10 mg DsPk Per dose pack (Patient not taking: Reported on 8/4/2021)    cyanocobalamin (VITAMIN B-12) 1,000 mcg tablet Take 1 Tab by mouth daily. (Patient not taking: Reported on 8/4/2021)    cholecalciferol (VITAMIN D3) 1,000 unit cap Take 1,000 Units by mouth daily. (Patient not taking: Reported on 10/15/2021)     No current facility-administered medications for this visit. PHYSICAL EXAMINATION:    Visit Vitals  /60   Pulse 64   Resp 18   Ht 6' 1\" (1.854 m)   Wt 224 lb (101.6 kg)   SpO2 98%   BMI 29.55 kg/m²         NEUROLOGICAL EXAMINATION:     Mental Status:   Alert and oriented to person and place. He could not tell me today's date. Has had difficulties with visuospatial and executive function. Delayed recall was 0/5. He scored 19/30 on [place] cognitive assessment. Cranial Nerves:    II, III, IV, VI:  Visual acuity grossly intact. Visual fields are normal.    Pupils are equal, round, and reactive to light and accommodation. Extra-ocular movements are full and fluid. Fundoscopic exam was benign, no ptosis or nystagmus. V-XII: Hearing is grossly intact. Facial features are symmetric, with normal sensation and strength. The palate rises symmetrically and the tongue protrudes midline. Sternocleidomastoids 5/5. Motor Examination: Normal tone, bulk, and strength.  5/5 muscle strength throughout. No cogwheel rigidity or clonus present. Sensory exam:  Normal throughout to pinprick, temperature, and vibration sense. Normal proprioception. Coordination:  Finger to nose and rapid arm movement testing was normal.   No resting or intention tremor    Gait and Station:  Steady. Normal arm swing. No Rhomberg or pronator drift. No muscle wasting or fasiculations noted. Reflexes:  DTRs 2+ throughout. Toes downgoing. LABS / IMAGING  Lab Results   Component Value Date/Time    WBC 6.8 05/07/2021 11:16 AM    HGB 15.6 05/07/2021 11:16 AM    HCT 48.0 05/07/2021 11:16 AM    PLATELET 670 24/55/9027 11:16 AM    MCV 97.4 05/07/2021 11:16 AM     Lab Results   Component Value Date/Time    Sodium 141 05/07/2021 11:16 AM    Potassium 4.2 05/07/2021 11:16 AM    Chloride 107 05/07/2021 11:16 AM    CO2 24 05/07/2021 11:16 AM    Anion gap 10 05/07/2021 11:16 AM    Glucose 103 (H) 05/07/2021 11:16 AM    BUN 15 05/07/2021 11:16 AM    Creatinine 1.24 05/07/2021 11:16 AM    BUN/Creatinine ratio 12 05/07/2021 11:16 AM    GFR est AA >60 05/07/2021 11:16 AM    GFR est non-AA 56 (L) 05/07/2021 11:16 AM    Calcium 9.2 05/07/2021 11:16 AM    Bilirubin, total 0.7 05/07/2021 11:16 AM    Alk. phosphatase 80 05/07/2021 11:16 AM    Protein, total 7.1 05/07/2021 11:16 AM    Albumin 4.2 05/07/2021 11:16 AM    Globulin 2.9 05/07/2021 11:16 AM    A-G Ratio 1.4 05/07/2021 11:16 AM    ALT (SGPT) 31 05/07/2021 11:16 AM    AST (SGOT) 21 05/07/2021 11:16 AM     Lab Results   Component Value Date/Time    TSH 3.540 01/21/2020 10:32 AM     Lab Results   Component Value Date/Time    Vitamin B12 318 05/07/2021 11:16 AM     CT Results (most recent):  Results from Hospital Encounter encounter on 04/08/21    CT HEAD WO CONT    Narrative  Indication:  Dementia    Comparison: None    Findings: 5 mm axial images were obtained from the skull base through the  vertex.     CT dose reduction was achieved through the use of a standardized protocol  tailored for this examination and automatic exposure control for dose  modulation. The ventricles and cortical sulci are prominent, compatible with age related  volume loss. There is no evidence of intracranial hemorrhage, mass, mass effect,  or acute infarct. There is periventricular white matter disease. No extra-axial  fluid collections are seen. The visualized paranasal sinuses and mastoid air  cells are clear. The orbital structures are unremarkable. No osseous  abnormalities are seen. Impression  1. No evidence of acute infarct or intracranial hemorrhage. 2. Periventricular white matter disease is likely secondary to chronic small  vessel ischemic changes. CT imaging was reviewed    ASSESSMENT    ICD-10-CM ICD-9-CM    1. 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 Cande Jacome. has at least moderate dementia of Alzheimer's type. He has a significant problems with short-term memory and has issues with visuospatial/executive function as well on his cognitive screening  His cognitive screening score has remained stable over the last 6 months  Continue donepezil and memantine  Continue to stay physically active and try to do memory exercises to improve his short-term memory  We again had a lengthy discussion that he should not be driving and he seemed to be receptive  Wife is aware that his needs may increase over time.   He is currently in a supervised situation and wife is very supportive  We will continue to follow him clinically and he should follow-up in 6 months    Time spent 40 minutes    Abigail Elam MD  Diplomate, American Board of Psychiatry & Neurology (Neurology)  Diplomate, American Board of Psychiatry & Neurology (Clinical Neurophysiology)  Diplomate, American Board of Electrodiagnostic Medicine

## 2021-11-11 ENCOUNTER — OFFICE VISIT (OUTPATIENT)
Dept: INTERNAL MEDICINE CLINIC | Age: 81
End: 2021-11-11
Payer: MEDICARE

## 2021-11-11 VITALS
TEMPERATURE: 97.4 F | RESPIRATION RATE: 16 BRPM | HEART RATE: 60 BPM | DIASTOLIC BLOOD PRESSURE: 68 MMHG | OXYGEN SATURATION: 94 % | HEIGHT: 73 IN | WEIGHT: 223 LBS | SYSTOLIC BLOOD PRESSURE: 124 MMHG | BODY MASS INDEX: 29.55 KG/M2

## 2021-11-11 DIAGNOSIS — I25.10 CORONARY ARTERY DISEASE INVOLVING NATIVE CORONARY ARTERY OF NATIVE HEART WITHOUT ANGINA PECTORIS: ICD-10-CM

## 2021-11-11 DIAGNOSIS — E78.00 HYPERCHOLESTEROLEMIA: ICD-10-CM

## 2021-11-11 DIAGNOSIS — E55.9 VITAMIN D DEFICIENCY: ICD-10-CM

## 2021-11-11 DIAGNOSIS — I10 ESSENTIAL HYPERTENSION: Primary | ICD-10-CM

## 2021-11-11 DIAGNOSIS — Z23 NEEDS FLU SHOT: ICD-10-CM

## 2021-11-11 DIAGNOSIS — F03.90 DEMENTIA WITHOUT BEHAVIORAL DISTURBANCE, UNSPECIFIED DEMENTIA TYPE: ICD-10-CM

## 2021-11-11 DIAGNOSIS — Z95.0 STATUS CARDIAC PACEMAKER: ICD-10-CM

## 2021-11-11 DIAGNOSIS — R73.9 HYPERGLYCEMIA: ICD-10-CM

## 2021-11-11 PROCEDURE — G8427 DOCREV CUR MEDS BY ELIG CLIN: HCPCS | Performed by: INTERNAL MEDICINE

## 2021-11-11 PROCEDURE — G0463 HOSPITAL OUTPT CLINIC VISIT: HCPCS | Performed by: INTERNAL MEDICINE

## 2021-11-11 PROCEDURE — G8536 NO DOC ELDER MAL SCRN: HCPCS | Performed by: INTERNAL MEDICINE

## 2021-11-11 PROCEDURE — G8752 SYS BP LESS 140: HCPCS | Performed by: INTERNAL MEDICINE

## 2021-11-11 PROCEDURE — G8432 DEP SCR NOT DOC, RNG: HCPCS | Performed by: INTERNAL MEDICINE

## 2021-11-11 PROCEDURE — 90694 VACC AIIV4 NO PRSRV 0.5ML IM: CPT | Performed by: INTERNAL MEDICINE

## 2021-11-11 PROCEDURE — G8417 CALC BMI ABV UP PARAM F/U: HCPCS | Performed by: INTERNAL MEDICINE

## 2021-11-11 PROCEDURE — 1101F PT FALLS ASSESS-DOCD LE1/YR: CPT | Performed by: INTERNAL MEDICINE

## 2021-11-11 PROCEDURE — G8754 DIAS BP LESS 90: HCPCS | Performed by: INTERNAL MEDICINE

## 2021-11-11 PROCEDURE — 99214 OFFICE O/P EST MOD 30 MIN: CPT | Performed by: INTERNAL MEDICINE

## 2021-11-11 NOTE — PROGRESS NOTES
RM 14    Chief Complaint   Patient presents with    Follow-up     3 month follow up BP and cholesterol        Visit Vitals  /68 (BP 1 Location: Left upper arm, BP Patient Position: Sitting, BP Cuff Size: Adult)   Pulse 60   Temp 97.4 °F (36.3 °C) (Oral)   Resp 16   Ht 6' 1\" (1.854 m)   Wt 223 lb (101.2 kg)   SpO2 94%   BMI 29.42 kg/m²       3 most recent PHQ Screens 10/15/2021   Little interest or pleasure in doing things Not at all   Feeling down, depressed, irritable, or hopeless Not at all   Total Score PHQ 2 0         1. Have you been to the ER, urgent care clinic since your last visit? Hospitalized since your last visit? No    2. Have you seen or consulted any other health care providers outside of the [address] [place] [address] since your last visit? Include any pap smears or colon screening. No    Health Maintenance Due   Topic Date Due    COVID-19 Vaccine (1) Never done    DTaP/Tdap/Td series (1 - Tdap) Never done    Shingrix Vaccine Age 50> (1 of 2) Never done    Flu Vaccine (1) 09/01/2021       Learning Assessment 4/2/2021   PRIMARY LEARNER Patient   HIGHEST LEVEL OF EDUCATION - PRIMARY LEARNER  -   BARRIERS PRIMARY LEARNER -   PRIMARY LANGUAGE ENGLISH   LEARNER PREFERENCE PRIMARY LISTENING     -     -     -   ANSWERED BY patient   RELATIONSHIP SELF     Patient declines flu vaccine today     AVS  education, follow up, and recommendations provided and addressed with patient.   services used to advise patient no

## 2021-11-11 NOTE — PROGRESS NOTES
HPI:  established patient  Presents for f/u HTN, lipids, etc    Accompanied by wife    Taking and tolerating meds    Seeing neuro re: dementia  At least stable at this point  No driving rec'd    Past medical, Social, and Family history reviewed    Prior to Admission medications    Medication Sig Start Date End Date Taking? Authorizing Provider   memantine (Namenda) 10 mg tablet Take 1 Tablet by mouth two (2) times a day. 8/4/21  Yes Nikki Singh MD   rosuvastatin (Crestor) 10 mg tablet TAKE 1 TABLET BY MOUTH EVERY NIGHT AT BEDTIME 8/4/21  Yes Nikki Singh MD   ramipriL (ALTACE) 10 mg capsule Take 1 Capsule by mouth daily. 8/4/21  Yes Nikki Singh MD   hydroCHLOROthiazide (HYDRODIURIL) 12.5 mg tablet Take 1 Tablet by mouth daily. 8/4/21  Yes Nikki Singh MD   donepeziL (ARICEPT) 10 mg tablet Take 1 Tablet by mouth nightly. 8/4/21  Yes Nikki Singh MD   sildenafil, antihypertensive, (REVATIO) 20 mg tablet TAKE 1 TABLET BY MOUTH EVERY DAY AS NEEDED 4/18/19  Yes Nikki Singh MD   docosahexanoic acid/epa (FISH OIL PO) Take 2,400 mg by mouth nightly. Yes Provider, Historical   cholecalciferol (VITAMIN D3) 1,000 unit cap Take 1,000 Units by mouth daily. Yes Provider, Historical   acetaminophen (TYLENOL EXTRA STRENGTH) 500 mg tablet Take 500 mg by mouth as needed for Pain. Yes Provider, Historical   aspirin (ASPIRIN) 325 mg tablet Take 325 mg by mouth daily. Yes Provider, Historical   memantine (Namenda Titration Lucas) 5-10 mg DsPk Per dose pack  Patient not taking: Reported on 8/4/2021 3/2/21   Nikki Singh MD   cyanocobalamin (VITAMIN B-12) 1,000 mcg tablet Take 1 Tab by mouth daily. Patient not taking: Reported on 8/4/2021 8/19/19   Kailash Moon MD          ROS  Complete ROS reviewed and negative or stable except as noted in HPI. Physical Exam  Vitals and nursing note reviewed. Constitutional:       General: He is not in acute distress.   HENT:      Head: Normocephalic and atraumatic. Eyes:      General: No scleral icterus. Pupils: Pupils are equal, round, and reactive to light. Neck:      Vascular: No JVD. Cardiovascular:      Rate and Rhythm: Normal rate and regular rhythm. Heart sounds: Normal heart sounds. No murmur heard. No friction rub. No gallop. Pulmonary:      Effort: Pulmonary effort is normal. No respiratory distress. Breath sounds: Normal breath sounds. No wheezing or rales. Abdominal:      General: There is no distension. Palpations: Abdomen is soft. Tenderness: There is no abdominal tenderness. Musculoskeletal:         General: Normal range of motion. Cervical back: Normal range of motion and neck supple. Lymphadenopathy:      Cervical: No cervical adenopathy. Skin:     General: Skin is warm. Findings: No rash. Neurological:      General: No focal deficit present. Mental Status: He is alert and oriented to person, place, and time. Mental status is at baseline. Motor: No abnormal muscle tone. Coordination: Coordination normal.      Comments: Oriented to person and place. Prior labs reviewed. Assessment/Plan:    ICD-10-CM ICD-9-CM    1. Essential hypertension  I10 401.9 CBC WITH AUTOMATED DIFF      CBC WITH AUTOMATED DIFF   2. Needs flu shot  Z23 V04.81 FLU (FLUAD QUAD INFLUENZA VACCINE,QUAD,ADJUVANTED)   3. Dementia without behavioral disturbance, unspecified dementia type ([facility]  F03.90 294.20    4. Hypercholesterolemia  E78.00 272.0 CK      LIPID PANEL      METABOLIC PANEL, COMPREHENSIVE      METABOLIC PANEL, COMPREHENSIVE      LIPID PANEL      CK   5. Status cardiac pacemaker  Z95.0 V45.01    6. Coronary artery disease involving native coronary artery of native heart without angina pectoris  I25.10 414.01    7. Vitamin D deficiency  E55.9 268.9 VITAMIN D, 25 HYDROXY      VITAMIN D, 25 HYDROXY   8.  Hyperglycemia  R73.9 790.29 HEMOGLOBIN A1C WITH EAG      HEMOGLOBIN A1C WITH EAG     Follow-up and Dispositions    · Return in about 6 months (around 5/11/2022), or if symptoms worsen or fail to improve, for Medicare Wellness Visit. results and schedule of future  studies reviewed with patient  reviewed diet, exercise and weight    cardiovascular risk and specific lipid/LDL goals reviewed  reviewed medications and side effects in detail    Labs  Continue your current medications  Flu shot  No driving - pt and wife expressed understanding        An electronic signature was used to authenticate this note.   -- Pola Mccormick MD

## 2021-11-12 LAB
25(OH)D3 SERPL-MCNC: 34.1 NG/ML (ref 30–100)
ALBUMIN SERPL-MCNC: 4 G/DL (ref 3.5–5)
ALBUMIN/GLOB SERPL: 1.3 {RATIO} (ref 1.1–2.2)
ALP SERPL-CCNC: 66 U/L (ref 45–117)
ALT SERPL-CCNC: 26 U/L (ref 12–78)
ANION GAP SERPL CALC-SCNC: 6 MMOL/L (ref 5–15)
AST SERPL-CCNC: 21 U/L (ref 15–37)
BASOPHILS # BLD: 0 K/UL (ref 0–0.1)
BASOPHILS NFR BLD: 0 % (ref 0–1)
BILIRUB SERPL-MCNC: 0.9 MG/DL (ref 0.2–1)
BUN SERPL-MCNC: 14 MG/DL (ref 6–20)
BUN/CREAT SERPL: 11 (ref 12–20)
CALCIUM SERPL-MCNC: 9 MG/DL (ref 8.5–10.1)
CHLORIDE SERPL-SCNC: 106 MMOL/L (ref 97–108)
CHOLEST SERPL-MCNC: 134 MG/DL
CK SERPL-CCNC: 120 U/L (ref 39–308)
CO2 SERPL-SCNC: 27 MMOL/L (ref 21–32)
CREAT SERPL-MCNC: 1.27 MG/DL (ref 0.7–1.3)
DIFFERENTIAL METHOD BLD: NORMAL
EOSINOPHIL # BLD: 0.2 K/UL (ref 0–0.4)
EOSINOPHIL NFR BLD: 3 % (ref 0–7)
ERYTHROCYTE [DISTWIDTH] IN BLOOD BY AUTOMATED COUNT: 12.5 % (ref 11.5–14.5)
EST. AVERAGE GLUCOSE BLD GHB EST-MCNC: 108 MG/DL
GLOBULIN SER CALC-MCNC: 3.2 G/DL (ref 2–4)
GLUCOSE SERPL-MCNC: 109 MG/DL (ref 65–100)
HBA1C MFR BLD: 5.4 % (ref 4–5.6)
HCT VFR BLD AUTO: 48.4 % (ref 36.6–50.3)
HDLC SERPL-MCNC: 35 MG/DL
HDLC SERPL: 3.8 {RATIO} (ref 0–5)
HGB BLD-MCNC: 15.6 G/DL (ref 12.1–17)
IMM GRANULOCYTES # BLD AUTO: 0 K/UL (ref 0–0.04)
IMM GRANULOCYTES NFR BLD AUTO: 0 % (ref 0–0.5)
LDLC SERPL CALC-MCNC: 72.8 MG/DL (ref 0–100)
LYMPHOCYTES # BLD: 2.1 K/UL (ref 0.8–3.5)
LYMPHOCYTES NFR BLD: 27 % (ref 12–49)
MCH RBC QN AUTO: 31.8 PG (ref 26–34)
MCHC RBC AUTO-ENTMCNC: 32.2 G/DL (ref 30–36.5)
MCV RBC AUTO: 98.8 FL (ref 80–99)
MONOCYTES # BLD: 0.9 K/UL (ref 0–1)
MONOCYTES NFR BLD: 12 % (ref 5–13)
NEUTS SEG # BLD: 4.5 K/UL (ref 1.8–8)
NEUTS SEG NFR BLD: 58 % (ref 32–75)
NRBC # BLD: 0 K/UL (ref 0–0.01)
NRBC BLD-RTO: 0 PER 100 WBC
PLATELET # BLD AUTO: 199 K/UL (ref 150–400)
PMV BLD AUTO: 10.6 FL (ref 8.9–12.9)
POTASSIUM SERPL-SCNC: 4.6 MMOL/L (ref 3.5–5.1)
PROT SERPL-MCNC: 7.2 G/DL (ref 6.4–8.2)
RBC # BLD AUTO: 4.9 M/UL (ref 4.1–5.7)
SODIUM SERPL-SCNC: 139 MMOL/L (ref 136–145)
TRIGL SERPL-MCNC: 131 MG/DL (ref ?–150)
VLDLC SERPL CALC-MCNC: 26.2 MG/DL
WBC # BLD AUTO: 7.8 K/UL (ref 4.1–11.1)

## 2021-11-26 RX ORDER — HYDROCHLOROTHIAZIDE 12.5 MG/1
12.5 TABLET ORAL DAILY
Qty: 90 TABLET | Refills: 1 | Status: SHIPPED | OUTPATIENT
Start: 2021-11-26 | End: 2022-05-13

## 2022-03-19 PROBLEM — I25.10 CORONARY ARTERY DISEASE INVOLVING NATIVE CORONARY ARTERY OF NATIVE HEART WITHOUT ANGINA PECTORIS: Status: ACTIVE | Noted: 2019-08-19

## 2022-03-19 PROBLEM — R41.9 MEDICATION NONCOMPLIANCE DUE TO COGNITIVE IMPAIRMENT: Status: ACTIVE | Noted: 2019-08-19

## 2022-03-19 PROBLEM — Z91.148 MEDICATION NONCOMPLIANCE DUE TO COGNITIVE IMPAIRMENT: Status: ACTIVE | Noted: 2019-08-19

## 2022-03-19 PROBLEM — R97.20 RISING PSA LEVEL: Status: ACTIVE | Noted: 2017-11-16

## 2022-03-20 PROBLEM — I49.5 SICK SINUS SYNDROME (HCC): Status: ACTIVE | Noted: 2019-08-19

## 2022-05-11 ENCOUNTER — OFFICE VISIT (OUTPATIENT)
Dept: INTERNAL MEDICINE CLINIC | Age: 82
End: 2022-05-11
Payer: MEDICARE

## 2022-05-11 VITALS
HEIGHT: 73 IN | WEIGHT: 232 LBS | SYSTOLIC BLOOD PRESSURE: 133 MMHG | OXYGEN SATURATION: 93 % | BODY MASS INDEX: 30.75 KG/M2 | HEART RATE: 60 BPM | TEMPERATURE: 98.6 F | DIASTOLIC BLOOD PRESSURE: 69 MMHG

## 2022-05-11 DIAGNOSIS — E78.00 HYPERCHOLESTEROLEMIA: ICD-10-CM

## 2022-05-11 DIAGNOSIS — N40.0 BENIGN PROSTATIC HYPERPLASIA WITHOUT LOWER URINARY TRACT SYMPTOMS: ICD-10-CM

## 2022-05-11 DIAGNOSIS — I10 ESSENTIAL HYPERTENSION: ICD-10-CM

## 2022-05-11 DIAGNOSIS — H53.9 VISION CHANGES: ICD-10-CM

## 2022-05-11 DIAGNOSIS — E53.8 B12 DEFICIENCY: ICD-10-CM

## 2022-05-11 DIAGNOSIS — H91.90 CHANGE IN HEARING, UNSPECIFIED LATERALITY: ICD-10-CM

## 2022-05-11 DIAGNOSIS — E55.9 VITAMIN D DEFICIENCY: ICD-10-CM

## 2022-05-11 DIAGNOSIS — Z00.00 MEDICARE ANNUAL WELLNESS VISIT, SUBSEQUENT: Primary | ICD-10-CM

## 2022-05-11 DIAGNOSIS — Z95.0 STATUS CARDIAC PACEMAKER: ICD-10-CM

## 2022-05-11 DIAGNOSIS — R73.9 HYPERGLYCEMIA: ICD-10-CM

## 2022-05-11 DIAGNOSIS — F03.90 DEMENTIA WITHOUT BEHAVIORAL DISTURBANCE, UNSPECIFIED DEMENTIA TYPE: ICD-10-CM

## 2022-05-11 DIAGNOSIS — I49.5 SICK SINUS SYNDROME (HCC): ICD-10-CM

## 2022-05-11 DIAGNOSIS — N18.31 STAGE 3A CHRONIC KIDNEY DISEASE (HCC): ICD-10-CM

## 2022-05-11 PROBLEM — N18.30 CHRONIC RENAL DISEASE, STAGE III (HCC): Status: ACTIVE | Noted: 2022-05-11

## 2022-05-11 PROCEDURE — G8417 CALC BMI ABV UP PARAM F/U: HCPCS | Performed by: INTERNAL MEDICINE

## 2022-05-11 PROCEDURE — 99214 OFFICE O/P EST MOD 30 MIN: CPT | Performed by: INTERNAL MEDICINE

## 2022-05-11 PROCEDURE — G8510 SCR DEP NEG, NO PLAN REQD: HCPCS | Performed by: INTERNAL MEDICINE

## 2022-05-11 PROCEDURE — G8536 NO DOC ELDER MAL SCRN: HCPCS | Performed by: INTERNAL MEDICINE

## 2022-05-11 PROCEDURE — 1101F PT FALLS ASSESS-DOCD LE1/YR: CPT | Performed by: INTERNAL MEDICINE

## 2022-05-11 PROCEDURE — G8752 SYS BP LESS 140: HCPCS | Performed by: INTERNAL MEDICINE

## 2022-05-11 PROCEDURE — G8427 DOCREV CUR MEDS BY ELIG CLIN: HCPCS | Performed by: INTERNAL MEDICINE

## 2022-05-11 PROCEDURE — G8754 DIAS BP LESS 90: HCPCS | Performed by: INTERNAL MEDICINE

## 2022-05-11 PROCEDURE — G0463 HOSPITAL OUTPT CLINIC VISIT: HCPCS | Performed by: INTERNAL MEDICINE

## 2022-05-11 PROCEDURE — G0439 PPPS, SUBSEQ VISIT: HCPCS | Performed by: INTERNAL MEDICINE

## 2022-05-11 NOTE — PROGRESS NOTES
RM 13    Chief Complaint   Patient presents with    Annual Wellness Visit       Visit Vitals  /69   Pulse 60   Temp 98.6 °F (37 °C)   Ht 6' 1\" (1.854 m)   Wt 232 lb (105.2 kg)   SpO2 93%   BMI 30.61 kg/m²       1. Have you been to the ER, urgent care clinic since your last visit? Hospitalized since your last visit? No    2. Have you seen or consulted any other health care providers outside of the [address] [place] [address] since your last visit? Include any pap smears or colon screening. No    Health Maintenance Due   Topic Date Due    COVID-19 Vaccine (1) Never done    DTaP/Tdap/Td series (1 - Tdap) Never done    Shingrix Vaccine Age 50> (1 of 2) Never done    Medicare Yearly Exam  05/04/2022       3 most recent PHQ Screens 5/11/2022   Little interest or pleasure in doing things Not at all   Feeling down, depressed, irritable, or hopeless Not at all   Total Score PHQ 2 0     Fall Risk Assessment, last 12 mths 10/15/2021   Able to walk? Yes   Fall in past 12 months? 0   Do you feel unsteady? 0   Are you worried about falling 0     Abuse Screening Questionnaire 5/11/2022   Do you ever feel afraid of your partner? N   Are you in a relationship with someone who physically or mentally threatens you? N   Is it safe for you to go home?  Y     ADL Assessment 5/11/2022   Feeding yourself No Help Needed   Getting from bed to chair No Help Needed   Getting dressed No Help Needed   Bathing or showering No Help Needed   Walk across the room (includes cane/walker) No Help Needed   Using the telphone No Help Needed   Taking your medications No Help Needed   Preparing meals No Help Needed   Managing money (expenses/bills) No Help Needed   Moderately strenuous housework (laundry) No Help Needed   Shopping for personal items (toiletries/medicines) No Help Needed   Shopping for groceries No Help Needed   Driving No Help Needed   Climbing a flight of stairs No Help Needed   Getting to places beyond walking distances No Help Needed       Learning Assessment 4/2/2021   PRIMARY LEARNER Patient   HIGHEST LEVEL OF EDUCATION - PRIMARY LEARNER  -   BARRIERS PRIMARY LEARNER -   PRIMARY LANGUAGE ENGLISH   LEARNER PREFERENCE PRIMARY LISTENING     -     -     -   ANSWERED BY patient   RELATIONSHIP SELF         AVS  education, follow up, and recommendations provided and addressed with patient.  services used to advise patient -no.

## 2022-05-11 NOTE — PROGRESS NOTES
HPI:  established patient  Presents for f/u HTN, lipids, etc    Accompanied by wife    Report decline in vision    Also, decline in hearing    Wife perceives the memory to be a little worse    Wife reports pt had a COVID booster    Taking and tolerating meds. Past medical, Social, and Family history reviewed    Prior to Admission medications    Medication Sig Start Date End Date Taking? Authorizing Provider   hydroCHLOROthiazide (HYDRODIURIL) 12.5 mg tablet TAKE 1 TABLET BY MOUTH DAILY 11/26/21  Yes Melvin Solorio MD   memantine (Namenda) 10 mg tablet Take 1 Tablet by mouth two (2) times a day. 8/4/21  Yes Melvin Solorio MD   rosuvastatin (Crestor) 10 mg tablet TAKE 1 TABLET BY MOUTH EVERY NIGHT AT BEDTIME 8/4/21  Yes Melvin Solorio MD   ramipriL (ALTACE) 10 mg capsule Take 1 Capsule by mouth daily. 8/4/21  Yes Melvin Solorio MD   donepeziL (ARICEPT) 10 mg tablet Take 1 Tablet by mouth nightly. 8/4/21  Yes Melvin Solorio MD   sildenafil, antihypertensive, (REVATIO) 20 mg tablet TAKE 1 TABLET BY MOUTH EVERY DAY AS NEEDED 4/18/19  Yes Melvin Solorio MD   docosahexanoic acid/epa (FISH OIL PO) Take 2,400 mg by mouth nightly. Yes Provider, Historical   acetaminophen (TYLENOL EXTRA STRENGTH) 500 mg tablet Take 500 mg by mouth as needed for Pain. Yes Provider, Historical   aspirin (ASPIRIN) 325 mg tablet Take 325 mg by mouth daily. Yes Provider, Historical   memantine (Namenda Titration Lucas) 5-10 mg DsPk Per dose pack  Patient not taking: Reported on 8/4/2021 3/2/21   Melvin Solorio MD   cyanocobalamin (VITAMIN B-12) 1,000 mcg tablet Take 1 Tab by mouth daily. Patient not taking: Reported on 8/4/2021 8/19/19   Livia Nguyen MD   cholecalciferol (VITAMIN D3) 1,000 unit cap Take 1,000 Units by mouth daily. Provider, Historical          ROS  Complete ROS reviewed and negative or stable except as noted in HPI. Physical Exam  Vitals and nursing note reviewed. Constitutional:       General: He is not in acute distress. HENT:      Head: Normocephalic and atraumatic. Eyes:      General: No scleral icterus. Pupils: Pupils are equal, round, and reactive to light. Neck:      Vascular: No JVD. Cardiovascular:      Rate and Rhythm: Normal rate and regular rhythm. Heart sounds: Normal heart sounds. No murmur heard. No friction rub. No gallop. Pulmonary:      Effort: Pulmonary effort is normal. No respiratory distress. Breath sounds: Normal breath sounds. No wheezing or rales. Abdominal:      General: There is no distension. Palpations: Abdomen is soft. Tenderness: There is no abdominal tenderness. Musculoskeletal:         General: Normal range of motion. Cervical back: Normal range of motion and neck supple. Lymphadenopathy:      Cervical: No cervical adenopathy. Skin:     General: Skin is warm. Findings: No rash. Neurological:      Mental Status: He is alert and oriented to person, place, and time. Motor: No abnormal muscle tone. Coordination: Coordination normal.           Prior labs reviewed. Assessment/Plan:    ICD-10-CM ICD-9-CM    1. Essential hypertension  I10 401.9 CBC WITH AUTOMATED DIFF      CBC WITH AUTOMATED DIFF   2. Medicare annual wellness visit, subsequent  Z00.00 V70.0    3. Dementia without behavioral disturbance, unspecified dementia type ([facility]  F03.90 294.20    4. Stage 3a chronic kidney disease (HCC)  N18.31 585.3    5. Sick sinus syndrome (HCC)  I49.5 427.81    6. Hypercholesterolemia  E78.00 272.0 CK      LIPID PANEL      METABOLIC PANEL, COMPREHENSIVE      METABOLIC PANEL, COMPREHENSIVE      LIPID PANEL      CK   7. Status cardiac pacemaker  Z95.0 V45.01    8. Vitamin D deficiency  E55.9 268.9 VITAMIN D, 25 HYDROXY      VITAMIN D, 25 HYDROXY   9. Hyperglycemia  R73.9 790.29 HEMOGLOBIN A1C WITH EAG      HEMOGLOBIN A1C WITH EAG   10.  Benign prostatic hyperplasia without lower urinary tract symptoms  N40.0 600.00    11. Change in hearing, unspecified laterality  H91.90 389.9 REFERRAL TO AUDIOLOGY   12. Vision changes  H53.9 368.9 REFERRAL TO OPHTHALMOLOGY   13. B12 deficiency  E53.8 266.2 VITAMIN B12      VITAMIN B12     Follow-up and Dispositions    · Return in about 6 months (around 11/11/2022), or if symptoms worsen or fail to improve, for blood pressure, cholesterol. results and schedule of future studies reviewed with patient  reviewed diet, exercise and weight    cardiovascular risk and specific lipid/LDL goals reviewed  reviewed medications and side effects in detail    Ref to audiology  Ref to eye  See neuro as scheduled  Continue current medications   Labs today  COVID booster (#4)        An electronic signature was used to authenticate this note.   -- Srinivas Fernández MD

## 2022-05-11 NOTE — PATIENT INSTRUCTIONS
Medicare Wellness Visit, Male    The best way to live healthy is to have a lifestyle where you eat a well-balanced diet, exercise regularly, limit alcohol use, and quit all forms of tobacco/nicotine, if applicable. Regular preventive services are another way to keep healthy. Preventive services (vaccines, screening tests, monitoring & exams) can help personalize your care plan, which helps you manage your own care. Screening tests can find health problems at the earliest stages, when they are easiest to treat. [facility] follows the current, evidence-based guidelines published by the [facility] ([facility]) when recommending preventive services for our patients. Because we follow these guidelines, sometimes recommendations change over time as research supports it. (For example, a prostate screening blood test is no longer routinely recommended for men with no symptoms). Of course, you and your doctor may decide to screen more often for some diseases, based on your risk and co-morbidities (chronic disease you are already diagnosed with). Preventive services for you include:  - Medicare offers their members a free annual wellness visit, which is time for you and your primary care provider to discuss and plan for your preventive service needs. Take advantage of this benefit every year!  -All adults over age 72 should receive the recommended pneumonia vaccines. Current USPSTF guidelines recommend a series of two vaccines for the best pneumonia protection.   -All adults should have a flu vaccine yearly and tetanus vaccine every 10 years.  -All adults age 48 and older should receive the shingles vaccines (series of two vaccines).        -All adults age 38-68 who are overweight should have a diabetes screening test once every three years.   -Other screening tests & preventive services for persons with diabetes include: an eye exam to screen for diabetic retinopathy, a kidney function test, a foot exam, and stricter control over your cholesterol.   -Cardiovascular screening for adults with routine risk involves an electrocardiogram (ECG) at intervals determined by the provider.   -Colorectal cancer screening should be done for adults age 54-65 with no increased risk factors for colorectal cancer. There are a number of acceptable methods of screening for this type of cancer. Each test has its own benefits and drawbacks. Discuss with your provider what is most appropriate for you during your annual wellness visit. The different tests include: colonoscopy (considered the best screening method), a fecal occult blood test, a fecal DNA test, and sigmoidoscopy.  -All adults [place] between [facility] should be screened once for Hepatitis C.  -An Abdominal Aortic Aneurysm (AAA) Screening is recommended for men age 73-68 who has ever smoked in their lifetime.      Here is a list of your current Health Maintenance items (your personalized list of preventive services) with a due date:  Health Maintenance Due   Topic Date Due    COVID-19 Vaccine (1) Never done    DTaP/Tdap/Td  (1 - Tdap) Never done    Shingles Vaccine (1 of 2) Never done     Ask pharmacy about shingles and Tdap vaccines

## 2022-05-11 NOTE — PROGRESS NOTES
This is the Subsequent Medicare Annual Wellness Exam, performed 12 months or more after the Initial AWV or the last Subsequent AWV    I have reviewed the patient's medical history in detail and updated the computerized patient record. Assessment/Plan   Education and counseling provided:  Are appropriate based on today's review and evaluation    ICD-10-CM ICD-9-CM    1. Medicare annual wellness visit, subsequent  Z00.00 V70.0    2. Essential hypertension  I10 401.9 CBC WITH AUTOMATED DIFF      CBC WITH AUTOMATED DIFF   3. Dementia without behavioral disturbance, unspecified dementia type ([facility] [address]  F03.90 294.20    4. Stage 3a chronic kidney disease (HCC)  N18.31 585.3    5. Sick sinus syndrome (HCC)  I49.5 427.81    6. Hypercholesterolemia  E78.00 272.0 CK      LIPID PANEL      METABOLIC PANEL, COMPREHENSIVE      METABOLIC PANEL, COMPREHENSIVE      LIPID PANEL      CK   7. Status cardiac pacemaker  Z95.0 V45.01    8. Vitamin D deficiency  E55.9 268.9 VITAMIN D, 25 HYDROXY      VITAMIN D, 25 HYDROXY   9. Hyperglycemia  R73.9 790.29 HEMOGLOBIN A1C WITH EAG      HEMOGLOBIN A1C WITH EAG   10. Benign prostatic hyperplasia without lower urinary tract symptoms  N40.0 600.00    11. Change in hearing, unspecified laterality  H91.90 389.9 REFERRAL TO AUDIOLOGY   12. Vision changes  H53.9 368.9 REFERRAL TO OPHTHALMOLOGY   13. B12 deficiency  E53.8 266.2 VITAMIN B12      VITAMIN B12     Follow-up and Dispositions    · Return in about 6 months (around 11/11/2022), or if symptoms worsen or fail to improve, for blood pressure, cholesterol.        results and schedule of future studies reviewed with patient  reviewed diet, exercise and weight    cardiovascular risk and specific lipid/LDL goals reviewed  reviewed medications and side effects in detail     Depression Risk Factor Screening     3 most recent PHQ Screens 5/11/2022   Little interest or pleasure in doing things Not at all   Feeling down, depressed, irritable, or hopeless Not at all   Total Score PHQ 2 0       Alcohol & Drug Abuse Risk Screen    Do you average more than 1 drink per night or more than 7 drinks a week: No    In the past three months have you have had more than 4 drinks containing alcohol on one occasion: No          Functional Ability and Level of Safety    Hearing: The patient needs further evaluation. Activities of Daily Living: The home contains: no safety equipment. Patient needs help with:  transportation, shopping, preparing meals, laundry, housework, managing medications and managing money      Ambulation: with no difficulty     Fall Risk:  Fall Risk Assessment, last 12 mths 10/15/2021   Able to walk? Yes   Fall in past 12 months? 0   Do you feel unsteady?  0   Are you worried about falling 0      Abuse Screen:  Patient is not abused       Cognitive Screening    Has your family/caregiver stated any concerns about your memory: yes - known dementia     Cognitive Screening: Normal - seeing neuro ,      Health Maintenance Due     Health Maintenance Due   Topic Date Due    COVID-19 Vaccine (1) Never done    DTaP/Tdap/Td series (1 - Tdap) Never done    Shingrix Vaccine Age 50> (1 of 2) Never done       Patient Care Team   Patient Care Team:  Elina Chugn MD as PCP - General (Internal Medicine Physician)  Elina Chung MD as PCP - [facility] Empaneled Provider  Aida Scott MD (Cardiovascular Disease Physician)  Servando Huerta MD (Dermatology Physician)  Jose Lopez MD (Orthopedic Surgery)    History     Patient Active Problem List   Diagnosis Code    Hypertension I10    Hypercholesterolemia E78.00    Colon polyps K63.5    Benign prostatic hyperplasia without lower urinary tract symptoms N40.0    Renal cysts, acquired, bilateral N28.1    Status cardiac pacemaker Z95.0    OA (osteoarthritis) of knee M17.10    Mild cognitive impairment with memory loss G31.84    Erectile dysfunction N52.9    Advanced directives, counseling/discussion Z71.89    Rising PSA level R97.20    Medication noncompliance due to cognitive impairment Z91.14    Sick sinus syndrome (HCC) I49.5    Coronary artery disease involving native coronary artery of native heart without angina pectoris I25.10     Past Medical History:   Diagnosis Date    Anxiety     hx zoloft use    BPH (benign prostatic hypertrophy)     Bradycardia     syncope - s/p pacemaker - Dr. Elo Gotti Chest pain     neg stress test - l2/2011 - EF 61%    Colon polyps     Diverticulitis of colon     DJD (degenerative joint disease) of knee     ED (erectile dysfunction)     GERD (gastroesophageal reflux disease)     EGD - 5/11 - gastritis    Hypercholesterolemia     Hypertension     Mild cognitive impairment with memory loss     Mild pulmonary hypertension (HCC)     OA (osteoarthritis) of knee     left - Dr. Sow Comes Pacemaker     PFO with atrial septal aneurysm     Renal cysts, acquired, bilateral     S/P knee replacement     right    Screening for AAA (abdominal aortic aneurysm)     NL 5/11    SSS (sick sinus syndrome) (HCC)     Status cardiac pacemaker     Sun-damaged skin     Sunburn, blistering     Wears glasses       Past Surgical History:   Procedure Laterality Date    COLONOSCOPY N/A 2/13/2019    COLONOSCOPY performed by Clementine Loyd MD at [facility] END[facility]COPY    HX ORTHOPAEDIC      R knee replacement - 2009ish    HX PACEMAKER      HX PACEMAKER PLACEMENT      HX TONSIL AND ADENOIDECTOMY       Current Outpatient Medications   Medication Sig Dispense Refill    hydroCHLOROthiazide (HYDRODIURIL) 12.5 mg tablet TAKE 1 TABLET BY MOUTH DAILY 90 Tablet 1    memantine (Namenda) 10 mg tablet Take 1 Tablet by mouth two (2) times a day. 180 Tablet 3    rosuvastatin (Crestor) 10 mg tablet TAKE 1 TABLET BY MOUTH EVERY NIGHT AT BEDTIME 90 Tablet 3    ramipriL (ALTACE) 10 mg capsule Take 1 Capsule by mouth daily.  90 Capsule 1    donepeziL (ARICEPT) 10 mg tablet Take 1 Tablet by mouth nightly. 90 Tablet 1    sildenafil, antihypertensive, (REVATIO) 20 mg tablet TAKE 1 TABLET BY MOUTH EVERY DAY AS NEEDED 20 Tab 2    docosahexanoic acid/epa (FISH OIL PO) Take 2,400 mg by mouth nightly.  acetaminophen (TYLENOL EXTRA STRENGTH) 500 mg tablet Take 500 mg by mouth as needed for Pain.  aspirin (ASPIRIN) 325 mg tablet Take 325 mg by mouth daily.  memantine (Namenda Titration Lucas) 5-10 mg DsPk Per dose pack (Patient not taking: Reported on 2021) 45 Tab 5    cyanocobalamin (VITAMIN B-12) 1,000 mcg tablet Take 1 Tab by mouth daily. (Patient not taking: Reported on 2021) 90 Tab 3    cholecalciferol (VITAMIN D3) 1,000 unit cap Take 1,000 Units by mouth daily. No Known Allergies    Family History   Problem Relation Age of Onset    Dementia Mother     Diabetes Mother     Hypertension Mother     Hypertension Brother      Social History     Tobacco Use    Smoking status: Former Smoker     Types: Cigarettes     Quit date: 1964     Years since quittin.9    Smokeless tobacco: Never Used   Substance Use Topics    Alcohol use:  Yes     Alcohol/week: 7.0 standard drinks     Types: 7 Cans of beer per week     Comment: 1 beer/day         Maxine Bush MD

## 2022-05-12 LAB
25(OH)D3 SERPL-MCNC: 23.4 NG/ML (ref 30–100)
ALBUMIN SERPL-MCNC: 3.7 G/DL (ref 3.5–5)
ALBUMIN/GLOB SERPL: 1.2 {RATIO} (ref 1.1–2.2)
ALP SERPL-CCNC: 74 U/L (ref 45–117)
ALT SERPL-CCNC: 20 U/L (ref 12–78)
ANION GAP SERPL CALC-SCNC: 7 MMOL/L (ref 5–15)
AST SERPL-CCNC: 15 U/L (ref 15–37)
BASOPHILS # BLD: 0 K/UL (ref 0–0.1)
BASOPHILS NFR BLD: 0 % (ref 0–1)
BILIRUB SERPL-MCNC: 0.5 MG/DL (ref 0.2–1)
BUN SERPL-MCNC: 11 MG/DL (ref 6–20)
BUN/CREAT SERPL: 9 (ref 12–20)
CALCIUM SERPL-MCNC: 8.9 MG/DL (ref 8.5–10.1)
CHLORIDE SERPL-SCNC: 106 MMOL/L (ref 97–108)
CHOLEST SERPL-MCNC: 169 MG/DL
CK SERPL-CCNC: 63 U/L (ref 39–308)
CO2 SERPL-SCNC: 24 MMOL/L (ref 21–32)
CREAT SERPL-MCNC: 1.19 MG/DL (ref 0.7–1.3)
DIFFERENTIAL METHOD BLD: ABNORMAL
EOSINOPHIL # BLD: 0.3 K/UL (ref 0–0.4)
EOSINOPHIL NFR BLD: 4 % (ref 0–7)
ERYTHROCYTE [DISTWIDTH] IN BLOOD BY AUTOMATED COUNT: 13 % (ref 11.5–14.5)
EST. AVERAGE GLUCOSE BLD GHB EST-MCNC: 111 MG/DL
GLOBULIN SER CALC-MCNC: 3.2 G/DL (ref 2–4)
GLUCOSE SERPL-MCNC: 117 MG/DL (ref 65–100)
HBA1C MFR BLD: 5.5 % (ref 4–5.6)
HCT VFR BLD AUTO: 49.6 % (ref 36.6–50.3)
HDLC SERPL-MCNC: 30 MG/DL
HDLC SERPL: 5.6 {RATIO} (ref 0–5)
HGB BLD-MCNC: 15.8 G/DL (ref 12.1–17)
IMM GRANULOCYTES # BLD AUTO: 0 K/UL (ref 0–0.04)
IMM GRANULOCYTES NFR BLD AUTO: 1 % (ref 0–0.5)
LDLC SERPL CALC-MCNC: 85 MG/DL (ref 0–100)
LYMPHOCYTES # BLD: 1.7 K/UL (ref 0.8–3.5)
LYMPHOCYTES NFR BLD: 24 % (ref 12–49)
MCH RBC QN AUTO: 31.3 PG (ref 26–34)
MCHC RBC AUTO-ENTMCNC: 31.9 G/DL (ref 30–36.5)
MCV RBC AUTO: 98.2 FL (ref 80–99)
MONOCYTES # BLD: 0.8 K/UL (ref 0–1)
MONOCYTES NFR BLD: 11 % (ref 5–13)
NEUTS SEG # BLD: 4.4 K/UL (ref 1.8–8)
NEUTS SEG NFR BLD: 60 % (ref 32–75)
NRBC # BLD: 0 K/UL (ref 0–0.01)
NRBC BLD-RTO: 0 PER 100 WBC
PLATELET # BLD AUTO: 196 K/UL (ref 150–400)
PMV BLD AUTO: 11 FL (ref 8.9–12.9)
POTASSIUM SERPL-SCNC: 4.2 MMOL/L (ref 3.5–5.1)
PROT SERPL-MCNC: 6.9 G/DL (ref 6.4–8.2)
RBC # BLD AUTO: 5.05 M/UL (ref 4.1–5.7)
SODIUM SERPL-SCNC: 137 MMOL/L (ref 136–145)
TRIGL SERPL-MCNC: 270 MG/DL (ref ?–150)
VIT B12 SERPL-MCNC: 203 PG/ML (ref 193–986)
VLDLC SERPL CALC-MCNC: 54 MG/DL
WBC # BLD AUTO: 7.3 K/UL (ref 4.1–11.1)

## 2022-05-13 DIAGNOSIS — I10 ESSENTIAL HYPERTENSION: ICD-10-CM

## 2022-05-13 RX ORDER — RAMIPRIL 10 MG/1
10 CAPSULE ORAL DAILY
Qty: 90 CAPSULE | Refills: 1 | Status: SHIPPED | OUTPATIENT
Start: 2022-05-13

## 2022-05-13 RX ORDER — HYDROCHLOROTHIAZIDE 12.5 MG/1
12.5 TABLET ORAL DAILY
Qty: 90 TABLET | Refills: 1 | Status: SHIPPED | OUTPATIENT
Start: 2022-05-13

## 2022-06-10 ENCOUNTER — OFFICE VISIT (OUTPATIENT)
Dept: NEUROLOGY | Age: 82
End: 2022-06-10
Payer: MEDICARE

## 2022-06-10 VITALS
DIASTOLIC BLOOD PRESSURE: 75 MMHG | TEMPERATURE: 97.7 F | HEART RATE: 60 BPM | WEIGHT: 235.4 LBS | OXYGEN SATURATION: 96 % | BODY MASS INDEX: 31.06 KG/M2 | SYSTOLIC BLOOD PRESSURE: 149 MMHG

## 2022-06-10 DIAGNOSIS — F02.80 DEMENTIA OF THE ALZHEIMER'S TYPE WITH LATE ONSET WITHOUT BEHAVIORAL DISTURBANCE (HCC): Primary | ICD-10-CM

## 2022-06-10 DIAGNOSIS — G30.1 DEMENTIA OF THE ALZHEIMER'S TYPE WITH LATE ONSET WITHOUT BEHAVIORAL DISTURBANCE (HCC): Primary | ICD-10-CM

## 2022-06-10 PROCEDURE — G8427 DOCREV CUR MEDS BY ELIG CLIN: HCPCS | Performed by: PSYCHIATRY & NEUROLOGY

## 2022-06-10 PROCEDURE — G8510 SCR DEP NEG, NO PLAN REQD: HCPCS | Performed by: PSYCHIATRY & NEUROLOGY

## 2022-06-10 PROCEDURE — G8754 DIAS BP LESS 90: HCPCS | Performed by: PSYCHIATRY & NEUROLOGY

## 2022-06-10 PROCEDURE — 1123F ACP DISCUSS/DSCN MKR DOCD: CPT | Performed by: PSYCHIATRY & NEUROLOGY

## 2022-06-10 PROCEDURE — G8753 SYS BP > OR = 140: HCPCS | Performed by: PSYCHIATRY & NEUROLOGY

## 2022-06-10 PROCEDURE — 1101F PT FALLS ASSESS-DOCD LE1/YR: CPT | Performed by: PSYCHIATRY & NEUROLOGY

## 2022-06-10 PROCEDURE — G8536 NO DOC ELDER MAL SCRN: HCPCS | Performed by: PSYCHIATRY & NEUROLOGY

## 2022-06-10 PROCEDURE — 99214 OFFICE O/P EST MOD 30 MIN: CPT | Performed by: PSYCHIATRY & NEUROLOGY

## 2022-06-10 PROCEDURE — G8417 CALC BMI ABV UP PARAM F/U: HCPCS | Performed by: PSYCHIATRY & NEUROLOGY

## 2022-06-10 NOTE — PROGRESS NOTES
Chief Complaint   Patient presents with    Follow-up    Dementia       HISTORY OF PRESENT ILLNESS  Ashlee Pierre. came back for follow-up. Overall, there is not much change. Patient does not report any worsening of his overall functioning or cognitive abilities and the wife agrees. He still often forgets that he has already eaten and will ask for a meal again. She supervises his medications. He does stay active, goes out for walks and sometimes he will go out with friends to play golf. Watches television most of the time. It is mostly sports but sometimes he will watch game shows. Not doing any memory exercises. Personal hygiene is not an issue. He has been advised not to drive but sometimes he will drive to the grocery store. Comprehensive neuropsychological assessment was reviewed and it was consistent with moderate to severe cognitive disorder, likely Alzheimer's dementia    RECAP  He was diagnosed with dementia about 6 years ago and has had progression in his functional and cognitive abilities over the last year or so. He came along with his daughter who lives in [place]. He has a son who lives up in [facility]. He is living with his wife in an independent living community. He is quite forgetful at baseline, will often repeat himself, forgets that he has already eaten, will pour some cereal in a bowl and then forgets about it and pours another one, forgets his appointments. He has been overly dependent on his wife and  and gets very nervous and anxious in her absence. Remains independent with basic activities of daily living and takes good care of his personal hygiene. He is not driving anymore. He is a retired  but recently has made mistakes in paying bills, writing checks etc. and family now overlooks everything. Denies any problems with sleep. No behavioral issues.        Current Outpatient Medications   Medication Sig    ramipriL (ALTACE) 10 mg capsule TAKE 1 CAPSULE BY MOUTH DAILY    hydroCHLOROthiazide (HYDRODIURIL) 12.5 mg tablet TAKE 1 TABLET BY MOUTH DAILY    memantine (Namenda) 10 mg tablet Take 1 Tablet by mouth two (2) times a day.  rosuvastatin (Crestor) 10 mg tablet TAKE 1 TABLET BY MOUTH EVERY NIGHT AT BEDTIME    donepeziL (ARICEPT) 10 mg tablet Take 1 Tablet by mouth nightly.  cyanocobalamin (VITAMIN B-12) 1,000 mcg tablet Take 1 Tab by mouth daily.  sildenafil, antihypertensive, (REVATIO) 20 mg tablet TAKE 1 TABLET BY MOUTH EVERY DAY AS NEEDED    docosahexanoic acid/epa (FISH OIL PO) Take 2,400 mg by mouth nightly.  cholecalciferol (VITAMIN D3) 1,000 unit cap Take 1,000 Units by mouth daily.  acetaminophen (TYLENOL EXTRA STRENGTH) 500 mg tablet Take 500 mg by mouth as needed for Pain.  aspirin (ASPIRIN) 325 mg tablet Take 325 mg by mouth daily.  memantine (Namenda Titration Lucas) 5-10 mg DsPk Per dose pack (Patient not taking: Reported on 8/4/2021)     No current facility-administered medications for this visit. PHYSICAL EXAMINATION:    Visit Vitals  BP (!) 149/75   Pulse 60   Temp 97.7 °F (36.5 °C) (Oral)   Wt 235 lb 6.4 oz (106.8 kg)   SpO2 96%   BMI 31.06 kg/m²         NEUROLOGICAL EXAMINATION:     Mental Status:   Alert and oriented to person and place. He could not tell me today's date. Has had difficulties with visuospatial and executive function. Delayed recall was 0/5. He scored 17/30 on Clark cognitive assessment. Cranial Nerves:    II, III, IV, VI:  Visual acuity grossly intact. Visual fields are normal.    Pupils are equal, round, and reactive to light and accommodation. Extra-ocular movements are full and fluid. Fundoscopic exam was benign, no ptosis or nystagmus. V-XII: Hearing is grossly intact. Facial features are symmetric, with normal sensation and strength. The palate rises symmetrically and the tongue protrudes midline. Sternocleidomastoids 5/5. Motor Examination: Normal tone, bulk, and strength. 5/5 muscle strength throughout. No cogwheel rigidity or clonus present. Sensory exam:  Normal throughout to pinprick, temperature, and vibration sense. Normal proprioception. Coordination:  Finger to nose and rapid arm movement testing was normal.   No resting or intention tremor    Gait and Station:  Steady. Normal arm swing. No Rhomberg or pronator drift. No muscle wasting or fasiculations noted. Reflexes:  DTRs 2+ throughout. Toes downgoing. LABS / IMAGING  Lab Results   Component Value Date/Time    WBC 7.3 05/11/2022 08:57 AM    HGB 15.8 05/11/2022 08:57 AM    HCT 49.6 05/11/2022 08:57 AM    PLATELET 480 95/32/3662 08:57 AM    MCV 98.2 05/11/2022 08:57 AM     Lab Results   Component Value Date/Time    Sodium 137 05/11/2022 08:57 AM    Potassium 4.2 05/11/2022 08:57 AM    Chloride 106 05/11/2022 08:57 AM    CO2 24 05/11/2022 08:57 AM    Anion gap 7 05/11/2022 08:57 AM    Glucose 117 (H) 05/11/2022 08:57 AM    BUN 11 05/11/2022 08:57 AM    Creatinine 1.19 05/11/2022 08:57 AM    BUN/Creatinine ratio 9 (L) 05/11/2022 08:57 AM    GFR est AA >60 05/11/2022 08:57 AM    GFR est non-AA 59 (L) 05/11/2022 08:57 AM    Calcium 8.9 05/11/2022 08:57 AM    Bilirubin, total 0.5 05/11/2022 08:57 AM    Alk. phosphatase 74 05/11/2022 08:57 AM    Protein, total 6.9 05/11/2022 08:57 AM    Albumin 3.7 05/11/2022 08:57 AM    Globulin 3.2 05/11/2022 08:57 AM    A-G Ratio 1.2 05/11/2022 08:57 AM    ALT (SGPT) 20 05/11/2022 08:57 AM    AST (SGOT) 15 05/11/2022 08:57 AM     Lab Results   Component Value Date/Time    TSH 3.540 01/21/2020 10:32 AM     Lab Results   Component Value Date/Time    Vitamin B12 203 05/11/2022 08:57 AM     CT Results (most recent):  [facility]    CT HEAD WO CONT    Narrative  Indication:  Dementia    Comparison: None    Findings: 5 mm axial images were obtained from the skull base through the  vertex.     CT dose reduction was achieved through the use of a standardized protocol  tailored for this examination and automatic exposure control for dose  modulation. The ventricles and cortical sulci are prominent, compatible with age related  volume loss. There is no evidence of intracranial hemorrhage, mass, mass effect,  or acute infarct. There is periventricular white matter disease. No extra-axial  fluid collections are seen. The visualized paranasal sinuses and mastoid air  cells are clear. The orbital structures are unremarkable. No osseous  abnormalities are seen. Impression  1. No evidence of acute infarct or intracranial hemorrhage. 2. Periventricular white matter disease is likely secondary to chronic small  vessel ischemic changes. CT imaging was reviewed    ASSESSMENT    ICD-10-CM ICD-9-CM    1. 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 Solitario Barron has at least moderate dementia of Alzheimer's type. He has a significant problems with short-term memory and has issues with visuospatial/executive function as well on his cognitive screening  His cognitive screening score has had a subtle decline compared to 6 months ago  Continue donepezil and memantine  Continue to stay physically active and he should try to do memory exercises to improve his short-term memory  We again had a lengthy discussion that he should not be driving  Wife is aware that his needs may increase over time.   He is currently in a supervised situation and wife is very supportive  We will continue to follow him clinically and he should follow-up in 6-8 months    Time spent 30 minutes    Robin Molina MD  Diplomate, American Board of Psychiatry & Neurology (Neurology)  Diplomate, American Board of Psychiatry & Neurology (Clinical Neurophysiology)  Diplomate, American Board of Electrodiagnostic Medicine

## 2022-08-11 DIAGNOSIS — E78.00 HYPERCHOLESTEROLEMIA: ICD-10-CM

## 2022-08-15 ENCOUNTER — DOCUMENTATION ONLY (OUTPATIENT)
Dept: INTERNAL MEDICINE CLINIC | Age: 82
End: 2022-08-15

## 2022-08-17 NOTE — TELEPHONE ENCOUNTER
Last visit 05/11/2022 MD Eunice Washington   Next appointment 11/11/2022 MD Eunice Washington   Previous refill encounter(s)    08/04/2021 Crestor #90 with 3 refills.      For Pharmacy Admin Tracking Only    Intervention Detail: New Rx: 1, reason: Patient Preference  Time Spent (min): 5      Requested Prescriptions     Pending Prescriptions Disp Refills    rosuvastatin (CRESTOR) 10 mg tablet [Pharmacy Med Name: ROSUVASTATIN 10MG TABLETS] 90 Tablet 0     Sig: TAKE 1 TABLET BY MOUTH EVERY NIGHT AT BEDTIME

## 2022-08-18 RX ORDER — ROSUVASTATIN CALCIUM 10 MG/1
TABLET, COATED ORAL
Qty: 90 TABLET | Refills: 0 | Status: SHIPPED | OUTPATIENT
Start: 2022-08-18

## 2022-10-11 DIAGNOSIS — F03.90 DEMENTIA WITHOUT BEHAVIORAL DISTURBANCE, UNSPECIFIED DEMENTIA TYPE: ICD-10-CM

## 2022-10-11 RX ORDER — DONEPEZIL HYDROCHLORIDE 10 MG/1
10 TABLET, FILM COATED ORAL
Qty: 90 TABLET | Refills: 0 | Status: SHIPPED | OUTPATIENT
Start: 2022-10-11

## 2022-10-11 NOTE — TELEPHONE ENCOUNTER
Last visit 05/11/2022 MD Pauly Bragg   Next appointment 11/11/2022 MD   Previous refill encounter(s)   08/04/2021 Aricept #90 with 1 refill. For Pharmacy Admin Tracking Only  Intervention Detail: New Rx: 1, reason: Patient Preference  Time Spent (min): 5      Requested Prescriptions     Pending Prescriptions Disp Refills    donepeziL (ARICEPT) 10 mg tablet 90 Tablet 0     Sig: Take 1 Tablet by mouth nightly.

## 2022-11-09 DIAGNOSIS — E78.00 HYPERCHOLESTEROLEMIA: ICD-10-CM

## 2022-11-10 RX ORDER — ROSUVASTATIN CALCIUM 10 MG/1
TABLET, COATED ORAL
Qty: 90 TABLET | Refills: 0 | Status: SHIPPED | OUTPATIENT
Start: 2022-11-10

## 2024-05-08 NOTE — PROGRESS NOTES
This is an Initial Medicare Annual Wellness Exam (AWV) (Performed 12 months after IPPE or effective date of Medicare Part B enrollment, Once in a lifetime)    I have reviewed the patient's medical history in detail and updated the computerized patient record. History     Past Medical History:   Diagnosis Date    Anxiety     hx zoloft use    BPH (benign prostatic hypertrophy)     Bradycardia     syncope - s/p pacemaker - Dr. Angelita Campos Chest pain     neg stress test - l2/2011 - EF 61%    Colon polyps     Diverticulitis of colon     DJD (degenerative joint disease) of knee     ED (erectile dysfunction)     GERD (gastroesophageal reflux disease)     EGD - 5/11 - gastritis    Hypercholesterolemia     Hypertension     Mild cognitive impairment with memory loss     Mild pulmonary hypertension     OA (osteoarthritis) of knee     left - Dr. Mary Ellen James PFO with atrial septal aneurysm     Renal cysts, acquired, bilateral     S/P knee replacement     right    Screening for AAA (abdominal aortic aneurysm)     NL 5/11    SSS (sick sinus syndrome) (HCC)     Status cardiac pacemaker     Wears glasses       Past Surgical History:   Procedure Laterality Date    HX ORTHOPAEDIC      R knee replacement - 2009ish    HX PACEMAKER      HX PACEMAKER PLACEMENT      HX TONSIL AND ADENOIDECTOMY       Current Outpatient Prescriptions   Medication Sig Dispense Refill    gabapentin (NEURONTIN) 300 mg capsule Take 1 Cap by mouth nightly. 30 Cap 2    sildenafil (REVATIO) 20 mg tablet TAKE 1 TABLET BY MOUTH EVERY DAY AS NEEDED 20 Tab 2    cyanocobalamin (VITAMIN B-12) 1,000 mcg tablet Take 1,000 mcg by mouth daily.  pyridoxine, vitamin B6, (VITAMIN B-6) 100 mg tablet Take 100 mg by mouth daily.  Cholecalciferol, Vitamin D3, 3,000 unit tab Take 1,000 Units by mouth daily.  acetaminophen (TYLENOL EXTRA STRENGTH) 500 mg tablet Take 500 mg by mouth every six (6) hours as needed for Pain.       hydrochlorothiazide (HYDRODIURIL) 12.5 mg tablet Take 1 Tab by mouth daily.  sildenafil citrate (VIAGRA) 100 mg tablet Take 1 Tab by mouth as needed. Indications: ERECTILE DYSFUNCTION 20 Tab 5    PREVNAR 13, PF, 0.5 mL syrg injection   0    NITROSTAT 0.4 mg SL tablet       CRESTOR 10 mg tablet TAKE 1 TABLET BY MOUTH EVERY NIGHT AT BEDTIME 90 Tab 3    aspirin (ASPIRIN) 325 mg tablet Take 325 mg by mouth daily.  omega-3 fatty acids-vitamin e (FISH OIL) 1,000 mg Cap Take 2 Caps by mouth daily.  Ramipril 10 mg Tab Take 10 mg by mouth daily. Indications: HYPERTENSION 80 Tab 3     No Known Allergies  Family History   Problem Relation Age of Onset    Dementia Mother     Diabetes Mother     Hypertension Mother     Hypertension Brother      Social History   Substance Use Topics    Smoking status: Former Smoker     Types: Cigarettes     Quit date: 6/29/1964    Smokeless tobacco: Never Used    Alcohol use 4.2 oz/week     7 Cans of beer per week      Comment: 1 beer/day     Patient Active Problem List   Diagnosis Code    Hypertension I10    Hypercholesterolemia E78.00    Colon polyps K63.5    Benign prostatic hyperplasia without lower urinary tract symptoms N40.0    Renal cysts, acquired, bilateral N28.1    Status cardiac pacemaker Z95.0    OA (osteoarthritis) of knee M17.10    Mild cognitive impairment with memory loss G31.84    Erectile dysfunction N52.9    Advanced directives, counseling/discussion Z71.89       Depression Risk Factor Screening:     PHQ over the last two weeks 11/3/2017   Little interest or pleasure in doing things Not at all   Feeling down, depressed or hopeless Not at all   Total Score PHQ 2 0     Alcohol Risk Factor Screening:    On any occasion in the past three months you have had more than 7 drinks containing alcohol    No    1 drink per day    Functional Ability and Level of Safety:     Hearing Loss  The patient needs further evaluation - but pt declines today citing he is doing ok. Activities of Daily Living  The home contains: no safety equipment. Patient does total self care    Fall Risk  Fall Risk Assessment, last 12 mths 11/3/2017   Able to walk? Yes   Fall in past 12 months? No       Abuse Screen  Patient is not abused    Cognitive Screening   Evaluation of Cognitive Function:  Has your family/caregiver stated any concerns about your memory: yes    But neuropsych testing stable [id number]  Unchanged. Patient Care Team   Patient Care Team:  Kristie Perkins MD as PCP - General (Pediatrics)  Adrien Armenta MD (Cardiology)  Chato Valencia MD (Dermatology)  Radha Turner MD (Orthopedic Surgery)  Ro Bernard RN as Nurse Navigator    Assessment/Plan   Education and counseling provided:  Are appropriate based on today's review and evaluation      ICD-10-CM ICD-9-CM    1. Initial Medicare annual wellness visit Z00.00 V70.0    2. Occipital neuralgia, unspecified laterality M54.81 723.8    3. Mild cognitive impairment with memory loss G31.84 331.83      780.93    4. Status cardiac pacemaker Z95.0 V45.01    5. Essential hypertension I10 401.9 CBC WITH AUTOMATED DIFF   6. Hypercholesterolemia E78.00 272.0 CK      LIPID PANEL      METABOLIC PANEL, COMPREHENSIVE   7. Benign prostatic hyperplasia without lower urinary tract symptoms N40.0 600.00 PSA, DIAGNOSTIC (PROSTATE SPECIFIC AG)   8. B12 deficiency E53.8 266.2 VITAMIN B12   9. Vitamin D deficiency E55.9 268.9 VITAMIN D, 25 HYDROXY   10. Hyperglycemia R73.9 790.29 HEMOGLOBIN A1C WITH EAG     Follow-up Disposition:  Return in about 6 months (around 5/3/2018), or if symptoms worsen or fail to improve, for blood pressure, cholesterol.   results and schedule of future studies reviewed with patient  reviewed diet, exercise and weight   cardiovascular risk and specific lipid/LDL goals reviewed  reviewed medications and side effects in detail Attending with

## (undated) DEVICE — CONNECTOR TBNG AUX H2O JET DISP FOR OLY 160/180 SER

## (undated) DEVICE — CANN NASAL O2 CAPNOGRAPHY AD -- FILTERLINE

## (undated) DEVICE — SOLIDIFIER FLUID 3000 CC ABSORB

## (undated) DEVICE — AIRLIFE™ U/CONNECT-IT OXYGEN TUBING 7 FEET (2.1 M) CRUSH-RESISTANT OXYGEN TUBING, VINYL TIPPED: Brand: AIRLIFE™

## (undated) DEVICE — 1200 GUARD II KIT W/5MM TUBE W/O VAC TUBE: Brand: GUARDIAN

## (undated) DEVICE — BW-412T DISP COMBO CLEANING BRUSH: Brand: SINGLE USE COMBINATION CLEANING BRUSH

## (undated) DEVICE — NEONATAL-ADULT SPO2 SENSOR: Brand: NELLCOR

## (undated) DEVICE — SET ADMIN 16ML TBNG L100IN 2 Y INJ SITE IV PIGGY BK DISP

## (undated) DEVICE — Z DISCONTINUED USE 2751540 TUBING IRRIG L10IN DISP PMP ENDOGATOR

## (undated) DEVICE — Z DISCONTINUED NO SUB IDED SET EXTN W/ 4 W STPCOCK M SPIN LOK 36IN

## (undated) DEVICE — ENDO CARRY-ON PROCEDURE KIT INCLUDES ENZYMATIC SPONGE, GAUZE, BIOHAZARD LABEL, TRAY, LUBRICANT, DIRTY SCOPE LABEL, WATER LABEL, TRAY, DRAWSTRING PAD, AND DEFENDO 4-PIECE KIT.: Brand: ENDO CARRY-ON PROCEDURE KIT

## (undated) DEVICE — NEEDLE HYPO 18GA L1.5IN PNK S STL HUB POLYPR SHLD REG BVL

## (undated) DEVICE — Device: Brand: MEDICAL ACTION INDUSTRIES

## (undated) DEVICE — BAG BELONG PT PERS CLEAR HANDL

## (undated) DEVICE — CATH IV AUTOGRD BC PNK 20GA 25 -- INSYTE

## (undated) DEVICE — Device

## (undated) DEVICE — QUILTED PREMIUM COMFORT UNDERPAD,EXTRA HEAVY: Brand: WINGS

## (undated) DEVICE — KENDALL RADIOLUCENT FOAM MONITORING ELECTRODE -RECTANGULAR SHAPE: Brand: KENDALL

## (undated) DEVICE — SYRINGE MED 20ML STD CLR PLAS LUERLOCK TIP N CTRL DISP